# Patient Record
Sex: MALE | Race: BLACK OR AFRICAN AMERICAN | Employment: OTHER | URBAN - METROPOLITAN AREA
[De-identification: names, ages, dates, MRNs, and addresses within clinical notes are randomized per-mention and may not be internally consistent; named-entity substitution may affect disease eponyms.]

---

## 2020-08-15 ENCOUNTER — APPOINTMENT (OUTPATIENT)
Dept: ULTRASOUND IMAGING | Age: 60
DRG: 308 | End: 2020-08-15
Payer: OTHER GOVERNMENT

## 2020-08-15 ENCOUNTER — HOSPITAL ENCOUNTER (INPATIENT)
Age: 60
LOS: 3 days | Discharge: ANOTHER HOSPITAL | DRG: 308 | End: 2020-08-18
Attending: EMERGENCY MEDICINE | Admitting: INTERNAL MEDICINE
Payer: OTHER GOVERNMENT

## 2020-08-15 ENCOUNTER — APPOINTMENT (OUTPATIENT)
Dept: GENERAL RADIOLOGY | Age: 60
DRG: 308 | End: 2020-08-15
Payer: OTHER GOVERNMENT

## 2020-08-15 ENCOUNTER — APPOINTMENT (OUTPATIENT)
Dept: CT IMAGING | Age: 60
DRG: 308 | End: 2020-08-15
Payer: OTHER GOVERNMENT

## 2020-08-15 PROBLEM — I48.92 ATRIAL FLUTTER WITH RAPID VENTRICULAR RESPONSE (HCC): Status: ACTIVE | Noted: 2020-08-15

## 2020-08-15 PROBLEM — I50.9 NEW ONSET OF CONGESTIVE HEART FAILURE (HCC): Status: ACTIVE | Noted: 2020-08-15

## 2020-08-15 PROBLEM — I50.9 ACUTE DECOMPENSATED HEART FAILURE (HCC): Status: ACTIVE | Noted: 2020-08-15

## 2020-08-15 LAB
ALBUMIN SERPL-MCNC: 4.1 G/DL (ref 3.5–5.2)
ALP BLD-CCNC: 51 U/L (ref 40–129)
ALT SERPL-CCNC: 264 U/L (ref 0–40)
ANION GAP SERPL CALCULATED.3IONS-SCNC: 11 MMOL/L (ref 7–16)
APTT: 31.5 SEC (ref 24.5–35.1)
AST SERPL-CCNC: 141 U/L (ref 0–39)
BASOPHILS ABSOLUTE: 0.03 E9/L (ref 0–0.2)
BASOPHILS RELATIVE PERCENT: 0.6 % (ref 0–2)
BILIRUB SERPL-MCNC: 1.4 MG/DL (ref 0–1.2)
BUN BLDV-MCNC: 14 MG/DL (ref 6–20)
CALCIUM SERPL-MCNC: 9.4 MG/DL (ref 8.6–10.2)
CHLORIDE BLD-SCNC: 99 MMOL/L (ref 98–107)
CO2: 24 MMOL/L (ref 22–29)
CREAT SERPL-MCNC: 1.2 MG/DL (ref 0.7–1.2)
EOSINOPHILS ABSOLUTE: 0.06 E9/L (ref 0.05–0.5)
EOSINOPHILS RELATIVE PERCENT: 1.3 % (ref 0–6)
GFR AFRICAN AMERICAN: >60
GFR NON-AFRICAN AMERICAN: >60 ML/MIN/1.73
GLUCOSE BLD-MCNC: 130 MG/DL (ref 74–99)
HCT VFR BLD CALC: 41.9 % (ref 37–54)
HEMOGLOBIN: 13.9 G/DL (ref 12.5–16.5)
IMMATURE GRANULOCYTES #: 0.02 E9/L
IMMATURE GRANULOCYTES %: 0.4 % (ref 0–5)
INR BLD: 1.1
LYMPHOCYTES ABSOLUTE: 1.39 E9/L (ref 1.5–4)
LYMPHOCYTES RELATIVE PERCENT: 30.1 % (ref 20–42)
MCH RBC QN AUTO: 34.8 PG (ref 26–35)
MCHC RBC AUTO-ENTMCNC: 33.2 % (ref 32–34.5)
MCV RBC AUTO: 104.8 FL (ref 80–99.9)
MONOCYTES ABSOLUTE: 0.54 E9/L (ref 0.1–0.95)
MONOCYTES RELATIVE PERCENT: 11.7 % (ref 2–12)
NEUTROPHILS ABSOLUTE: 2.58 E9/L (ref 1.8–7.3)
NEUTROPHILS RELATIVE PERCENT: 55.9 % (ref 43–80)
PDW BLD-RTO: 14 FL (ref 11.5–15)
PLATELET # BLD: 212 E9/L (ref 130–450)
PMV BLD AUTO: 11.3 FL (ref 7–12)
POTASSIUM REFLEX MAGNESIUM: 4.1 MMOL/L (ref 3.5–5)
PRO-BNP: 3313 PG/ML (ref 0–125)
PROTHROMBIN TIME: 12.8 SEC (ref 9.3–12.4)
RBC # BLD: 4 E12/L (ref 3.8–5.8)
SARS-COV-2, NAAT: NOT DETECTED
SODIUM BLD-SCNC: 134 MMOL/L (ref 132–146)
TOTAL PROTEIN: 7 G/DL (ref 6.4–8.3)
TROPONIN: <0.01 NG/ML (ref 0–0.03)
TSH SERPL DL<=0.05 MIU/L-ACNC: 3.3 UIU/ML (ref 0.27–4.2)
WBC # BLD: 4.6 E9/L (ref 4.5–11.5)

## 2020-08-15 PROCEDURE — 93970 EXTREMITY STUDY: CPT

## 2020-08-15 PROCEDURE — 85730 THROMBOPLASTIN TIME PARTIAL: CPT

## 2020-08-15 PROCEDURE — 6360000004 HC RX CONTRAST MEDICATION: Performed by: RADIOLOGY

## 2020-08-15 PROCEDURE — 99285 EMERGENCY DEPT VISIT HI MDM: CPT

## 2020-08-15 PROCEDURE — 96375 TX/PRO/DX INJ NEW DRUG ADDON: CPT

## 2020-08-15 PROCEDURE — 71046 X-RAY EXAM CHEST 2 VIEWS: CPT

## 2020-08-15 PROCEDURE — U0002 COVID-19 LAB TEST NON-CDC: HCPCS

## 2020-08-15 PROCEDURE — 84443 ASSAY THYROID STIM HORMONE: CPT

## 2020-08-15 PROCEDURE — 99284 EMERGENCY DEPT VISIT MOD MDM: CPT

## 2020-08-15 PROCEDURE — 99223 1ST HOSP IP/OBS HIGH 75: CPT | Performed by: INTERNAL MEDICINE

## 2020-08-15 PROCEDURE — 85610 PROTHROMBIN TIME: CPT

## 2020-08-15 PROCEDURE — 6360000002 HC RX W HCPCS: Performed by: PHYSICIAN ASSISTANT

## 2020-08-15 PROCEDURE — 2060000000 HC ICU INTERMEDIATE R&B

## 2020-08-15 PROCEDURE — 2500000003 HC RX 250 WO HCPCS: Performed by: EMERGENCY MEDICINE

## 2020-08-15 PROCEDURE — 80053 COMPREHEN METABOLIC PANEL: CPT

## 2020-08-15 PROCEDURE — 2580000003 HC RX 258: Performed by: EMERGENCY MEDICINE

## 2020-08-15 PROCEDURE — 84484 ASSAY OF TROPONIN QUANT: CPT

## 2020-08-15 PROCEDURE — 85025 COMPLETE CBC W/AUTO DIFF WBC: CPT

## 2020-08-15 PROCEDURE — G0480 DRUG TEST DEF 1-7 CLASSES: HCPCS

## 2020-08-15 PROCEDURE — 71275 CT ANGIOGRAPHY CHEST: CPT

## 2020-08-15 PROCEDURE — 93005 ELECTROCARDIOGRAM TRACING: CPT | Performed by: EMERGENCY MEDICINE

## 2020-08-15 PROCEDURE — 80307 DRUG TEST PRSMV CHEM ANLYZR: CPT

## 2020-08-15 PROCEDURE — 93005 ELECTROCARDIOGRAM TRACING: CPT | Performed by: PHYSICIAN ASSISTANT

## 2020-08-15 PROCEDURE — 96366 THER/PROPH/DIAG IV INF ADDON: CPT

## 2020-08-15 PROCEDURE — 83880 ASSAY OF NATRIURETIC PEPTIDE: CPT

## 2020-08-15 PROCEDURE — 96365 THER/PROPH/DIAG IV INF INIT: CPT

## 2020-08-15 RX ORDER — FUROSEMIDE 10 MG/ML
40 INJECTION INTRAMUSCULAR; INTRAVENOUS ONCE
Status: COMPLETED | OUTPATIENT
Start: 2020-08-15 | End: 2020-08-15

## 2020-08-15 RX ADMIN — FUROSEMIDE 40 MG: 10 INJECTION, SOLUTION INTRAMUSCULAR; INTRAVENOUS at 20:46

## 2020-08-15 RX ADMIN — DILTIAZEM HYDROCHLORIDE 25 MG: 5 INJECTION INTRAVENOUS at 21:11

## 2020-08-15 RX ADMIN — DEXTROSE MONOHYDRATE 5 MG/HR: 50 INJECTION, SOLUTION INTRAVENOUS at 22:29

## 2020-08-15 RX ADMIN — DILTIAZEM HYDROCHLORIDE 25 MG: 5 INJECTION INTRAVENOUS at 20:46

## 2020-08-15 RX ADMIN — IOPAMIDOL 85 ML: 755 INJECTION, SOLUTION INTRAVENOUS at 21:57

## 2020-08-15 NOTE — ED PROVIDER NOTES
ATTENDING PROVIDER ATTESTATION:     Ashley Levin presented to the emergency department for evaluation of Leg Swelling (bilateral, sob on exertion, x2 weeks) and Shortness of Breath    I have reviewed and discussed the case, including pertinent history (medical, surgical, family and social) and exam findings with the Midlevel and the Nurse assigned to Ashley Levin. I have personally performed and/or participated in the history, exam, medical decision making, and procedures and agree with all pertinent clinical information. HPI:  Patient is a 70-year-old male presenting with shortness of breath which is worse when laying flat and on exertion for 2 weeks. Shortness of breath is better with rest.  He also reports bilateral lower extremity. No chest pain, fevers, cough, abdominal pain, nausea, or vomiting. No personal cardiac history. He is a daily drinker. ROS:  Otherwise negative unless stated in HPI    PHYSICAL EXAM:  Vitals Reviewed  Constitutional/General: Alert and oriented x3, well appearing, non toxic in NAD  Head: Normocephalic and atraumatic  Eyes: PERRL, EOMI  Mouth: Oropharynx clear, handling secretions, no trismus  Neck: Supple, full ROM,   Pulmonary: Lungs crackles at lung bases, no respiratory distress  Cardiovascular:  Tachycardic. Irregular rhythm, no murmurs, gallops, or rubs. 2+ distal pulses  Abdomen: Soft, non tender, non distended,   Extremities: Moves all extremities x 4. Warm and well perfused. Bilateral pitting edema to LE, soft and easily compressible compartments  Skin: warm and dry without rash  Neurologic: GCS 15,  Psych: Normal Affect    MDM:   Patient is a 70-year-old male presenting with shortness of breath, orthopnea, and lower extremity edema. Patient was tachycardic on arrival with initial heart rates in the 140s. His blood pressures were stable, and he was afebrile. He was placed on the cardiac monitor. I reviewed and interpreted findings.   Patient in new onset a flutter with rapid rate. He also had nonspecific T wave changes on his EKG. CTA chest, bilateral lower extremity Dopplers, and labs were obtained. I reviewed and interpreted findings. Patient has no evidence of PE. He has mild pulmonary edema but good oxygen saturations. He was given Lasix. No DVTs on bilateral lower extremity Dopplers. He was given a bolus of Cardizem, and he was started on a Cardizem drip with good improvement of heart rates. Patient in no acute distress on reevaluation. Discussed findings and plan for admission. Plan to admit for further treatment of new onset a flutter with rapid rate. Patient was originally seen by the midlevel provider, and I assumed care of the patient. Kassi Henderson MD, am the primary provider of this chart. Critical Care:  Please note that the withdrawal or failure to initiate urgent interventions for this patient would likely result in a life threatening deterioration or permanent disability. Accordingly this patient received 30 minutes of critical care time, excluding separately billable procedures. Clinical Impression:  1. Atrial flutter with rapid ventricular response ([address]    2. Acute congestive heart failure, unspecified heart failure type ([address]      Dispo: Admit to telemetry  Condition: Stable        I have reviewed my findings and recommendations with Mason Patrick and members of family present at the time of disposition. My findings/plan: The primary encounter diagnosis was Atrial flutter with rapid ventricular response ([address] A diagnosis of Acute congestive heart failure, unspecified heart failure type [facility]) was also pertinent to this visit. New Prescriptions    No medications on file     Odalis Carcamo MD      ED Attending  CC:  No     Department of Emergency Medicine   ED  Provider Note  Admit Date/RoomTime: 8/15/2020  7:04 PM  ED Room: 04/04  Chief Complaint      Leg Swelling (bilateral, sob on exertion, x2 weeks) and Shortness of Breath    History of Present Illness   Source of history provided by:  patient. History/Exam Limitations: none. Raymundo Vyas is a 64 y.o. old male who has a past medical history of:   Past Medical History:   Diagnosis Date    Arthritis     Hypertension     Preoperative clearance 08/18/2016    medical clearance on paper chart from Dr. Dwight Clayton. Andrea Roberth Seasonal allergies     presents to the emergency department with a complaint of shortness of breath and leg swelling. Patient states for about 2 weeks now he has been short of breath. Progressively getting worse. Now to the point even small activity at home makes him short of breath. Unable to lay flat at night. Does also admit to chest pressure. Patient also complains of leg swelling. That started about 1 week ago. States he has never had swelling in his legs or feet before in the past.  Patient denies fevers and chills. Denies sore throat. Denies nausea, vomiting, diarrhea. States the swelling does not go up to his testicles. He denies any cardiac issues. Patient denies any lung diseases. Patient denies any kidney issues or failure. States he only has a history of hypertension. Ran out of his amlodipine 1 week ago. Patient complains of chronic right hip pain. States he was was to have a right hip replacement, but everything was canceled because of the coronavirus. Does admit that he takes Motrin 800s 3-4 times a day. States he has done this off and on now for the past few months. N/A  ROS   Pertinent positives and negatives are stated within HPI, all other systems reviewed and are negative. Past Surgical History:   Procedure Laterality Date    BACK SURGERY      DILATATION, ESOPHAGUS      HERNIA REPAIR      HIP SURGERY  09/13/2016    left total; hip arthroplasty   Social History:  reports that he has been smoking cigarettes. He has a 3.75 pack-year smoking history.  He uses smokeless tobacco. He reports current alcohol use of about 3.0 standard drinks of alcohol per week. He reports that he does not use drugs. Family History: family history is not on file. Allergies: Ace inhibitors and Lisinopril    Physical Exam           ED Triage Vitals   BP Temp Temp src Pulse Resp SpO2 Height Weight   08/15/20 1924 08/15/20 1854 -- 08/15/20 1854 08/15/20 1854 08/15/20 1854 -- --   (!) 159/106 97.1 °F (36.2 °C)  80 16 98 %        Oxygen Saturation Interpretation: Normal.  General:  NAD. Alert and Oriented. Well-appearing. Skin:  Warm, dry. No rashes. Head:  Normocephalic. Atraumatic. Eyes:  EOMI. Conjunctiva normal.  ENT:  Oral mucosa moist.  Airway patent. Neck:  Supple. Normal ROM. Positive JVD. Respiratory: Tachypnea. No labored breathing. Lungs diminished with bilateral rales at the bases. Cardiovascular: Tachycardia. Bilateral lower extremity pitting edema up to the level of distal thighs bilateral.  Extremities warm and good color. Chest: Nontender to palpation. Abdomen:  Soft, nondistended. Normal bowel sounds. Nontender to palpation all 4 quadrants. Negative rebound, negative guarding. Rectal:  Gu: Bladder nontender and non distended. No CVA tenderness. Pelvic:  Extremities:  Normal ROM. Nontender to palpation. Atraumatic. Back:  Normal ROM. Nontender to palpation. Neuro:  Alert and Oriented to person, place, time and situation. Normal LOC. Moves all extremities. Speech fluent. Psych:  Calm and Cooperative. Normal thought process. Normal judgement.         Lab / Imaging Results   (All laboratory and radiology results have been personally reviewed by myself)  Labs:  Results for orders placed or performed during the hospital encounter of 08/15/20   CBC Auto Differential   Result Value Ref Range    WBC 4.6 4.5 - 11.5 E9/L    RBC 4.00 3.80 - 5.80 E12/L    Hemoglobin 13.9 12.5 - 16.5 g/dL    Hematocrit 41.9 37.0 - 54.0 %    .8 (H) 80.0 - 99.9 fL    MCH 34.8 26.0 - 35.0 pg    MCHC 33.2 32.0 - 34.5 %    RDW 14.0 11.5 - 15.0 fL    Platelets 527 376 - 439 E9/L    MPV 11.3 7.0 - 12.0 fL    Neutrophils % 55.9 43.0 - 80.0 %    Immature Granulocytes % 0.4 0.0 - 5.0 %    Lymphocytes % 30.1 20.0 - 42.0 %    Monocytes % 11.7 2.0 - 12.0 %    Eosinophils % 1.3 0.0 - 6.0 %    Basophils % 0.6 0.0 - 2.0 %    Neutrophils Absolute 2.58 1.80 - 7.30 E9/L    Immature Granulocytes # 0.02 E9/L    Lymphocytes Absolute 1.39 (L) 1.50 - 4.00 E9/L    Monocytes Absolute 0.54 0.10 - 0.95 E9/L    Eosinophils Absolute 0.06 0.05 - 0.50 E9/L    Basophils Absolute 0.03 0.00 - 0.20 E9/L   Comprehensive Metabolic Panel w/ Reflex to MG   Result Value Ref Range    Sodium 134 132 - 146 mmol/L    Potassium reflex Magnesium 4.1 3.5 - 5.0 mmol/L    Chloride 99 98 - 107 mmol/L    CO2 24 22 - 29 mmol/L    Anion Gap 11 7 - 16 mmol/L    Glucose 130 (H) 74 - 99 mg/dL    BUN 14 6 - 20 mg/dL    CREATININE 1.2 0.7 - 1.2 mg/dL    GFR Non-African American >60 >=60 mL/min/1.73    GFR African American >60     Calcium 9.4 8.6 - 10.2 mg/dL    Total Protein 7.0 6.4 - 8.3 g/dL    Alb 4.1 3.5 - 5.2 g/dL    Total Bilirubin 1.4 (H) 0.0 - 1.2 mg/dL    Alkaline Phosphatase 51 40 - 129 U/L     (H) 0 - 40 U/L     (H) 0 - 39 U/L   Troponin   Result Value Ref Range    Troponin <0.01 0.00 - 0.03 ng/mL   Brain Natriuretic Peptide   Result Value Ref Range    Pro-BNP 3,313 (H) 0 - 125 pg/mL   Protime-INR   Result Value Ref Range    Protime 12.8 (H) 9.3 - 12.4 sec    INR 1.1    APTT   Result Value Ref Range    aPTT 31.5 24.5 - 35.1 sec   COVID-19   Result Value Ref Range    SARS-CoV-2, NAAT Not Detected Not Detected   TSH WITHOUT REFLEX   Result Value Ref Range    TSH 3.300 0.270 - 4.200 uIU/mL   Serum Drug Screen   Result Value Ref Range    Ethanol Lvl <10 mg/dL    Acetaminophen Level <5.0 (L) 10.0 - 99.9 mcg/mL    Salicylate, Serum <1.5 0.0 - 30.0 mg/dL     Imaging: All Radiology results interpreted by Radiologist unless otherwise noted.   CTA PULMONARY W CONTRAST   Final Result      1. No pulmonary embolism. 2. Cardiomegaly with mild interstitial edema in the lungs indicating   CHF exacerbation. 3. Hepatic steatosis. US DUP LOWER EXTREMITIES BILATERAL VENOUS   Final Result   No evidence to suggest deep venous thrombosis of the bilateral lower   extremities. XR CHEST (2 VW)   Final Result   Mild pulmonary congestion without evidence of significant pulmonary   edema or pleural effusion. EKG #1:  Interpreted by emergency department physician unless otherwise noted. Done 1932. Tachycardia with a heart rate of 138. Questionable flutter waves noted at 2-1. Diffuse ST depression. QTc 424. ED Course / Medical Decision Making     Medications   diltiazem (CARDIZEM) 25 mg in dextrose 5% 30 mL IVPB (ER Load) (0 mg Intravenous Stopped 8/15/20 2218)   dilTIAZem 100 mg in dextrose 5 % 100 mL infusion (ADD-Argillite) (10 mg/hr Intravenous Rate/Dose Change 8/15/20 2258)   furosemide (LASIX) injection 40 mg (40 mg Intravenous Given 8/15/20 2046)   iopamidol (ISOVUE-370) 76 % injection 85 mL (85 mLs Intravenous Given 8/15/20 2157)       Re-Evaluations:  8/15/20      Time:     Patients symptoms are improving. Consultations:             None    Procedures:   none    MDM:  Case discussed with MD/DO and/or ABI. Presenting complaint and pertinent exam findings were relayed. Current work-up and results and pending tests discussed. All questions answered. Care transitioned to Dr Jamison Pepe. Counseling:   I have spoken with the patient and discussed todays results, in addition to providing specific details for the plan of care and counseling regarding the diagnosis and prognosis and are agreeable with the plan. Assessment      1. Atrial flutter with rapid ventricular response ([address]    2.  Acute congestive heart failure, unspecified heart failure type [facility])      This patient's ED course included: a personal history and

## 2020-08-16 LAB
ACETAMINOPHEN LEVEL: <5 MCG/ML (ref 10–30)
ALBUMIN SERPL-MCNC: 3.8 G/DL (ref 3.5–5.2)
ALP BLD-CCNC: 48 U/L (ref 40–129)
ALT SERPL-CCNC: 232 U/L (ref 0–40)
AMPHETAMINE SCREEN, URINE: NOT DETECTED
ANION GAP SERPL CALCULATED.3IONS-SCNC: 13 MMOL/L (ref 7–16)
AST SERPL-CCNC: 116 U/L (ref 0–39)
BARBITURATE SCREEN URINE: NOT DETECTED
BASOPHILS ABSOLUTE: 0.04 E9/L (ref 0–0.2)
BASOPHILS RELATIVE PERCENT: 0.9 % (ref 0–2)
BENZODIAZEPINE SCREEN, URINE: NOT DETECTED
BILIRUB SERPL-MCNC: 1.1 MG/DL (ref 0–1.2)
BILIRUBIN DIRECT: 0.4 MG/DL (ref 0–0.3)
BILIRUBIN, INDIRECT: 0.7 MG/DL (ref 0–1)
BUN BLDV-MCNC: 15 MG/DL (ref 6–20)
CALCIUM SERPL-MCNC: 9.2 MG/DL (ref 8.6–10.2)
CANNABINOID SCREEN URINE: NOT DETECTED
CHLORIDE BLD-SCNC: 100 MMOL/L (ref 98–107)
CHOLESTEROL, FASTING: 155 MG/DL (ref 0–199)
CO2: 21 MMOL/L (ref 22–29)
COCAINE METABOLITE SCREEN URINE: NOT DETECTED
CREAT SERPL-MCNC: 1.2 MG/DL (ref 0.7–1.2)
EKG ATRIAL RATE: 276 BPM
EKG ATRIAL RATE: 277 BPM
EKG P AXIS: -90 DEGREES
EKG Q-T INTERVAL: 280 MS
EKG Q-T INTERVAL: 322 MS
EKG QRS DURATION: 76 MS
EKG QRS DURATION: 80 MS
EKG QTC CALCULATION (BAZETT): 424 MS
EKG QTC CALCULATION (BAZETT): 458 MS
EKG R AXIS: 24 DEGREES
EKG R AXIS: 69 DEGREES
EKG T AXIS: -111 DEGREES
EKG T AXIS: -97 DEGREES
EKG VENTRICULAR RATE: 122 BPM
EKG VENTRICULAR RATE: 138 BPM
EOSINOPHILS ABSOLUTE: 0.09 E9/L (ref 0.05–0.5)
EOSINOPHILS RELATIVE PERCENT: 2 % (ref 0–6)
ETHANOL: <10 MG/DL (ref 0–0.08)
FENTANYL SCREEN, URINE: NOT DETECTED
GFR AFRICAN AMERICAN: >60
GFR NON-AFRICAN AMERICAN: >60 ML/MIN/1.73
GLUCOSE BLD-MCNC: 105 MG/DL (ref 74–99)
HBA1C MFR BLD: 4.6 % (ref 4–5.6)
HCT VFR BLD CALC: 39.9 % (ref 37–54)
HDLC SERPL-MCNC: 57 MG/DL
HEMOGLOBIN: 13.5 G/DL (ref 12.5–16.5)
IMMATURE GRANULOCYTES #: 0.01 E9/L
IMMATURE GRANULOCYTES %: 0.2 % (ref 0–5)
LDL CHOLESTEROL CALCULATED: 83 MG/DL (ref 0–99)
LV EF: 23 %
LVEF MODALITY: NORMAL
LYMPHOCYTES ABSOLUTE: 1.52 E9/L (ref 1.5–4)
LYMPHOCYTES RELATIVE PERCENT: 33.9 % (ref 20–42)
Lab: NORMAL
MAGNESIUM: 1.8 MG/DL (ref 1.6–2.6)
MCH RBC QN AUTO: 34.8 PG (ref 26–35)
MCHC RBC AUTO-ENTMCNC: 33.8 % (ref 32–34.5)
MCV RBC AUTO: 102.8 FL (ref 80–99.9)
METHADONE SCREEN, URINE: NOT DETECTED
MONOCYTES ABSOLUTE: 0.58 E9/L (ref 0.1–0.95)
MONOCYTES RELATIVE PERCENT: 12.9 % (ref 2–12)
NEUTROPHILS ABSOLUTE: 2.24 E9/L (ref 1.8–7.3)
NEUTROPHILS RELATIVE PERCENT: 50.1 % (ref 43–80)
OPIATE SCREEN URINE: NOT DETECTED
OXYCODONE URINE: NOT DETECTED
PDW BLD-RTO: 13.9 FL (ref 11.5–15)
PHENCYCLIDINE SCREEN URINE: NOT DETECTED
PLATELET # BLD: 201 E9/L (ref 130–450)
PMV BLD AUTO: 11.2 FL (ref 7–12)
POTASSIUM REFLEX MAGNESIUM: 3.7 MMOL/L (ref 3.5–5)
RBC # BLD: 3.88 E12/L (ref 3.8–5.8)
SALICYLATE, SERUM: <0.3 MG/DL (ref 0–30)
SODIUM BLD-SCNC: 134 MMOL/L (ref 132–146)
TOTAL PROTEIN: 6.4 G/DL (ref 6.4–8.3)
TRICYCLIC ANTIDEPRESSANTS SCREEN SERUM: NEGATIVE NG/ML
TRIGLYCERIDE, FASTING: 74 MG/DL (ref 0–149)
TROPONIN: 0.01 NG/ML (ref 0–0.03)
VLDLC SERPL CALC-MCNC: 15 MG/DL
WBC # BLD: 4.5 E9/L (ref 4.5–11.5)

## 2020-08-16 PROCEDURE — 93010 ELECTROCARDIOGRAM REPORT: CPT | Performed by: INTERNAL MEDICINE

## 2020-08-16 PROCEDURE — 83735 ASSAY OF MAGNESIUM: CPT

## 2020-08-16 PROCEDURE — 80307 DRUG TEST PRSMV CHEM ANLYZR: CPT

## 2020-08-16 PROCEDURE — 80048 BASIC METABOLIC PNL TOTAL CA: CPT

## 2020-08-16 PROCEDURE — 84484 ASSAY OF TROPONIN QUANT: CPT

## 2020-08-16 PROCEDURE — 6360000002 HC RX W HCPCS: Performed by: INTERNAL MEDICINE

## 2020-08-16 PROCEDURE — 36415 COLL VENOUS BLD VENIPUNCTURE: CPT

## 2020-08-16 PROCEDURE — APPSS60 APP SPLIT SHARED TIME 46-60 MINUTES: Performed by: NURSE PRACTITIONER

## 2020-08-16 PROCEDURE — 6370000000 HC RX 637 (ALT 250 FOR IP): Performed by: INTERNAL MEDICINE

## 2020-08-16 PROCEDURE — 99233 SBSQ HOSP IP/OBS HIGH 50: CPT | Performed by: INTERNAL MEDICINE

## 2020-08-16 PROCEDURE — 6360000002 HC RX W HCPCS: Performed by: NURSE PRACTITIONER

## 2020-08-16 PROCEDURE — 93306 TTE W/DOPPLER COMPLETE: CPT

## 2020-08-16 PROCEDURE — 2060000000 HC ICU INTERMEDIATE R&B

## 2020-08-16 PROCEDURE — 2500000003 HC RX 250 WO HCPCS: Performed by: EMERGENCY MEDICINE

## 2020-08-16 PROCEDURE — 80061 LIPID PANEL: CPT

## 2020-08-16 PROCEDURE — 99254 IP/OBS CNSLTJ NEW/EST MOD 60: CPT | Performed by: INTERNAL MEDICINE

## 2020-08-16 PROCEDURE — 83036 HEMOGLOBIN GLYCOSYLATED A1C: CPT

## 2020-08-16 PROCEDURE — 2580000003 HC RX 258: Performed by: INTERNAL MEDICINE

## 2020-08-16 PROCEDURE — 6370000000 HC RX 637 (ALT 250 FOR IP): Performed by: NURSE PRACTITIONER

## 2020-08-16 PROCEDURE — 85025 COMPLETE CBC W/AUTO DIFF WBC: CPT

## 2020-08-16 PROCEDURE — 80076 HEPATIC FUNCTION PANEL: CPT

## 2020-08-16 RX ORDER — ONDANSETRON 2 MG/ML
4 INJECTION INTRAMUSCULAR; INTRAVENOUS EVERY 6 HOURS PRN
Status: DISCONTINUED | OUTPATIENT
Start: 2020-08-16 | End: 2020-08-18 | Stop reason: HOSPADM

## 2020-08-16 RX ORDER — SPIRONOLACTONE 25 MG/1
25 TABLET ORAL DAILY
Status: DISCONTINUED | OUTPATIENT
Start: 2020-08-16 | End: 2020-08-16

## 2020-08-16 RX ORDER — POTASSIUM CHLORIDE 20 MEQ/1
40 TABLET, EXTENDED RELEASE ORAL PRN
Status: DISCONTINUED | OUTPATIENT
Start: 2020-08-16 | End: 2020-08-18 | Stop reason: HOSPADM

## 2020-08-16 RX ORDER — AMLODIPINE BESYLATE 2.5 MG/1
5 TABLET ORAL DAILY
Status: ON HOLD | COMMUNITY
End: 2020-08-20 | Stop reason: HOSPADM

## 2020-08-16 RX ORDER — POTASSIUM CHLORIDE 7.45 MG/ML
10 INJECTION INTRAVENOUS PRN
Status: DISCONTINUED | OUTPATIENT
Start: 2020-08-16 | End: 2020-08-18 | Stop reason: HOSPADM

## 2020-08-16 RX ORDER — ACETAMINOPHEN 650 MG/1
650 SUPPOSITORY RECTAL EVERY 6 HOURS PRN
Status: DISCONTINUED | OUTPATIENT
Start: 2020-08-16 | End: 2020-08-18 | Stop reason: HOSPADM

## 2020-08-16 RX ORDER — PROMETHAZINE HYDROCHLORIDE 25 MG/1
12.5 TABLET ORAL EVERY 6 HOURS PRN
Status: DISCONTINUED | OUTPATIENT
Start: 2020-08-16 | End: 2020-08-18 | Stop reason: HOSPADM

## 2020-08-16 RX ORDER — FUROSEMIDE 10 MG/ML
40 INJECTION INTRAMUSCULAR; INTRAVENOUS DAILY
Status: DISCONTINUED | OUTPATIENT
Start: 2020-08-16 | End: 2020-08-16

## 2020-08-16 RX ORDER — POTASSIUM CHLORIDE 20 MEQ/1
20 TABLET, EXTENDED RELEASE ORAL ONCE
Status: COMPLETED | OUTPATIENT
Start: 2020-08-16 | End: 2020-08-16

## 2020-08-16 RX ORDER — MAGNESIUM SULFATE IN WATER 40 MG/ML
2 INJECTION, SOLUTION INTRAVENOUS ONCE
Status: COMPLETED | OUTPATIENT
Start: 2020-08-16 | End: 2020-08-16

## 2020-08-16 RX ORDER — ACETAMINOPHEN 325 MG/1
650 TABLET ORAL EVERY 6 HOURS PRN
Status: DISCONTINUED | OUTPATIENT
Start: 2020-08-16 | End: 2020-08-18 | Stop reason: HOSPADM

## 2020-08-16 RX ORDER — FUROSEMIDE 10 MG/ML
40 INJECTION INTRAMUSCULAR; INTRAVENOUS 2 TIMES DAILY
Status: DISCONTINUED | OUTPATIENT
Start: 2020-08-16 | End: 2020-08-17

## 2020-08-16 RX ORDER — MAGNESIUM SULFATE 1 G/100ML
1 INJECTION INTRAVENOUS PRN
Status: DISCONTINUED | OUTPATIENT
Start: 2020-08-16 | End: 2020-08-18 | Stop reason: HOSPADM

## 2020-08-16 RX ORDER — METOPROLOL SUCCINATE 25 MG/1
25 TABLET, EXTENDED RELEASE ORAL DAILY
Status: DISCONTINUED | OUTPATIENT
Start: 2020-08-16 | End: 2020-08-16

## 2020-08-16 RX ORDER — SODIUM CHLORIDE 0.9 % (FLUSH) 0.9 %
10 SYRINGE (ML) INJECTION PRN
Status: DISCONTINUED | OUTPATIENT
Start: 2020-08-16 | End: 2020-08-18 | Stop reason: HOSPADM

## 2020-08-16 RX ORDER — SODIUM CHLORIDE 0.9 % (FLUSH) 0.9 %
10 SYRINGE (ML) INJECTION EVERY 12 HOURS SCHEDULED
Status: DISCONTINUED | OUTPATIENT
Start: 2020-08-16 | End: 2020-08-18 | Stop reason: HOSPADM

## 2020-08-16 RX ORDER — METOPROLOL SUCCINATE 50 MG/1
50 TABLET, EXTENDED RELEASE ORAL 2 TIMES DAILY
Status: DISCONTINUED | OUTPATIENT
Start: 2020-08-16 | End: 2020-08-18 | Stop reason: HOSPADM

## 2020-08-16 RX ORDER — METOPROLOL SUCCINATE 25 MG/1
25 TABLET, EXTENDED RELEASE ORAL ONCE
Status: COMPLETED | OUTPATIENT
Start: 2020-08-16 | End: 2020-08-16

## 2020-08-16 RX ORDER — SPIRONOLACTONE 25 MG/1
12.5 TABLET ORAL DAILY
Status: DISCONTINUED | OUTPATIENT
Start: 2020-08-17 | End: 2020-08-16

## 2020-08-16 RX ADMIN — ENOXAPARIN SODIUM 80 MG: 80 INJECTION SUBCUTANEOUS at 08:53

## 2020-08-16 RX ADMIN — METOPROLOL SUCCINATE 50 MG: 50 TABLET, EXTENDED RELEASE ORAL at 20:15

## 2020-08-16 RX ADMIN — METOPROLOL SUCCINATE 25 MG: 25 TABLET, EXTENDED RELEASE ORAL at 10:55

## 2020-08-16 RX ADMIN — METOPROLOL SUCCINATE 25 MG: 25 TABLET, EXTENDED RELEASE ORAL at 00:50

## 2020-08-16 RX ADMIN — SODIUM CHLORIDE, PRESERVATIVE FREE 10 ML: 5 INJECTION INTRAVENOUS at 08:53

## 2020-08-16 RX ADMIN — FUROSEMIDE 40 MG: 10 INJECTION, SOLUTION INTRAMUSCULAR; INTRAVENOUS at 17:47

## 2020-08-16 RX ADMIN — POTASSIUM CHLORIDE 20 MEQ: 20 TABLET, EXTENDED RELEASE ORAL at 09:43

## 2020-08-16 RX ADMIN — FUROSEMIDE 40 MG: 10 INJECTION, SOLUTION INTRAMUSCULAR; INTRAVENOUS at 08:52

## 2020-08-16 RX ADMIN — MAGNESIUM SULFATE HEPTAHYDRATE 2 G: 40 INJECTION, SOLUTION INTRAVENOUS at 10:25

## 2020-08-16 RX ADMIN — ENOXAPARIN SODIUM 80 MG: 80 INJECTION SUBCUTANEOUS at 00:50

## 2020-08-16 RX ADMIN — ENOXAPARIN SODIUM 80 MG: 80 INJECTION SUBCUTANEOUS at 20:15

## 2020-08-16 RX ADMIN — SODIUM CHLORIDE, PRESERVATIVE FREE 10 ML: 5 INJECTION INTRAVENOUS at 20:16

## 2020-08-16 RX ADMIN — DEXTROSE MONOHYDRATE 10 MG/HR: 50 INJECTION, SOLUTION INTRAVENOUS at 06:24

## 2020-08-16 RX ADMIN — SPIRONOLACTONE 25 MG: 25 TABLET ORAL at 08:52

## 2020-08-16 ASSESSMENT — PAIN SCALES - GENERAL
PAINLEVEL_OUTOF10: 0

## 2020-08-16 NOTE — CONSULTS
I independently interviewed and examined the patient. I have reviewed the above documentation completed by the ABI. Please see my additional contributions to the HPI, physical exam, and assessment / medical decision making. Reason for consult: Atrial fibrillation with RVR and CHF    He was not previously on to [facility]) cardiology. Mr. Cathy Myers is a 56-year-old -American gentleman with history of hypertension, tobacco use quit recently about 2 weeks ago mild obesity, osteoarthritis of the hips and retired from [employer], gained about 30 pounds after he stopped walking and denies any prior history of any coronary artery disease CVA, CHF or diabetes. He had a history of angioedema with ACE inhibitors. He presented to the hospital for the progressively increasing dyspnea for the past 3 weeks. His symptoms started about a month back mainly with exertion noticed dyspnea on going up and down the steps. Now for the past 3 weeks he noticed worsening of dyspnea orthopnea, paroxysmal nocturnal dyspnea. Now, he noticed leg edema for the past few days. He denies any chest pain, palpitations, dizziness syncope or presyncope. He was also not following low-salt diet. He came to the emergency room for further evaluation and was noted to have atrial fibrillation with rapid ventricular response he had a CTA chest which was negative for PE and the troponins were negative. Patient was initiated on Cardizem for  for rate control. His heart rate seems reasonably controlled now on 10 mg/hour of Cardizem infusion. He was also started on metoprolol 25 mg twice daily.   He is feeling much better after coming to the hospital.      Review of Systems:  Cardiac: As per HPI  General: No fever, chills  Pulmonary: As per HPI  GI: No nausea, vomiting  Musculoskeletal: HERRMANN x 4, no focal motor deficits  Skin: Intact, no rashes  Neuro/Psych: No headache or seizures    Physical Exam:  BP (!) 100/58   Pulse 88   Temp 98 °F with you for any further recommendations. Caleb Alonso MD, Sergey Sherwood.   [facility]) Cardiology

## 2020-08-16 NOTE — PLAN OF CARE
Problem: Cardiac:  Goal: Ability to maintain an adequate cardiac output will improve  Description: Ability to maintain an adequate cardiac output will improve  Outcome: Met This Shift  Goal: Hemodynamic stability will improve  Description: Hemodynamic stability will improve  Outcome: Met This Shift     Problem: Fluid Volume:  Goal: Ability to achieve and maintain adequate urine output will improve  Description: Ability to achieve and maintain adequate urine output will improve  Outcome: Met This Shift

## 2020-08-16 NOTE — PROGRESS NOTES
[facility] Hospitalist   Progress Note    Admitting Date and Time: 8/15/2020  7:04 PM  Admit Dx: Atrial flutter with rapid ventricular response ([address] [I48.92]  Atrial flutter with rapid ventricular response ([address] [I48.92]    Subjective:    Patient was admitted with Atrial flutter with rapid ventricular response ([address] [I48.92]  Atrial flutter with rapid ventricular response ([address] [I48.92]. Patient denies fever, chills, cp, sob, n/v.     sodium chloride flush  10 mL Intravenous 2 times per day    enoxaparin  80 mg Subcutaneous BID    furosemide  40 mg Intravenous BID    metoprolol succinate  50 mg Oral BID     sodium chloride flush, 10 mL, PRN  potassium chloride, 40 mEq, PRN    Or  potassium alternative oral replacement, 40 mEq, PRN    Or  potassium chloride, 10 mEq, PRN  magnesium sulfate, 1 g, PRN  acetaminophen, 650 mg, Q6H PRN    Or  acetaminophen, 650 mg, Q6H PRN  magnesium hydroxide, 30 mL, Daily PRN  promethazine, 12.5 mg, Q6H PRN    Or  ondansetron, 4 mg, Q6H PRN         Objective:        PHYSICAL EXAM:    Vitals:  /80   Pulse 60   Temp 97.5 °F (36.4 °C) (Oral)   Resp 16   Ht 6' (1.829 m)   Wt 238 lb 9.6 oz (108.2 kg)   SpO2 95%   BMI 32.36 kg/m²     General:  Appears comfortable. Answers questions appropriately and cooperative with exam  HEENT:  Mucous membranes moist. No erythema, rhinorrhea, or post-nasal drip noted. Neck:  No carotid bruits. Heart:  Rhythm irregular at rate of 64  Lungs:  CTA. No wheeze, rales, or rhonchi  Abdomen:  Positive bowel sounds positive. Soft. Non-tender. No guarding, rebound or rigidity. Breast/Rectal/Genitourinary: not pertinent. Extremities:  positive for 1+ b/l lower extremity edema  Skin:  Warm and dry  Vascular: 2/4 Dorsalis Pedis pulses bilaterally. Neuro:  Cranial nerves 2-12 grossly intact, no focal weakness or change in sensation noted. Extraocular muscles intact. Pupils equal, round, reactive to light. Recent Labs     08/15/20  1948 08/16/20  0328    134   K 4.1 3.7   CL 99 100   CO2 24 21*   BUN 14 15   CREATININE 1.2 1.2   GLUCOSE 130* 105*   CALCIUM 9.4 9.2       Recent Labs     08/15/20  1948 08/16/20  0328   WBC 4.6 4.5   RBC 4.00 3.88   HGB 13.9 13.5   HCT 41.9 39.9   .8* 102.8*   MCH 34.8 34.8   MCHC 33.2 33.8   RDW 14.0 13.9    201   MPV 11.3 11.2       CBC with Differential:    Lab Results   Component Value Date    WBC 4.5 08/16/2020    RBC 3.88 08/16/2020    HGB 13.5 08/16/2020    HCT 39.9 08/16/2020     08/16/2020    .8 08/16/2020    MCH 34.8 08/16/2020    MCHC 33.8 08/16/2020    RDW 13.9 08/16/2020    LYMPHOPCT 33.9 08/16/2020    MONOPCT 12.9 08/16/2020    BASOPCT 0.9 08/16/2020    MONOSABS 0.58 08/16/2020    LYMPHSABS 1.52 08/16/2020    EOSABS 0.09 08/16/2020    BASOSABS 0.04 08/16/2020     BMP:    Lab Results   Component Value Date     08/16/2020    K 3.7 08/16/2020     08/16/2020    CO2 21 08/16/2020    BUN 15 08/16/2020    LABALBU 3.8 08/16/2020    CREATININE 1.2 08/16/2020    CALCIUM 9.2 08/16/2020    GFRAA >60 08/16/2020    LABGLOM >60 08/16/2020    GLUCOSE 105 08/16/2020     Hepatic Function Panel:    Lab Results   Component Value Date    ALKPHOS 48 08/16/2020     08/16/2020     08/16/2020    PROT 6.4 08/16/2020    BILITOT 1.1 08/16/2020    BILIDIR 0.4 08/16/2020    IBILI 0.7 08/16/2020    LABALBU 3.8 08/16/2020     Magnesium:    Lab Results   Component Value Date    MG 1.8 08/16/2020     Troponin:    Lab Results   Component Value Date    TROPONINI 0.01 08/16/2020        Radiology:   CTA PULMONARY W CONTRAST   Final Result      1. No pulmonary embolism. 2. Cardiomegaly with mild interstitial edema in the lungs indicating   CHF exacerbation. 3. Hepatic steatosis. US DUP LOWER EXTREMITIES BILATERAL VENOUS   Final Result   No evidence to suggest deep venous thrombosis of the bilateral lower   extremities. XR CHEST (2 VW)   Final Result   Mild pulmonary congestion without evidence of significant pulmonary   edema or pleural effusion. Assessment:    Principal Problem:    New onset atrial flutter (HCC)  Active Problems:    Essential hypertension    Acute decompensated heart failure ([address]    New onset of congestive heart failure (HCC)  Resolved Problems:    * No resolved hospital problems. *      Plan:  1. Atrial fib/flutter continue anticoagulation. Transition pt off cardizem gtt to po meds. Check echo. Cardiology following. Pt on diurertic  2. htn monitor bp and adjust meds  3. H/o angioedema due to aceI monitor  4. Acidosis monitor  5. Hyperglycemia likely due to stress monitor  6. Elevated lfts possibly due to liver congestion monitor.      Chart reviewed and updated by nursing    Time spent is 35 min    Electronically signed by Chano Marx DO on 8/16/2020 at 6:45 PM

## 2020-08-16 NOTE — CONSULTS
Signed              I independently interviewed and examined the patient. I have reviewed the above documentation completed by the ABI. Please see my additional contributions to the HPI, physical exam, and assessment / medical decision making.     Reason for consult: Atrial fibrillation with RVR and CHF     He was not previously on to [facility] ([facility]) cardiology.     Mr. Yoly Sweeney is a 61-year-old -American gentleman with history of hypertension, tobacco use quit recently about 2 weeks ago mild obesity, osteoarthritis of the hips and retired from [employer], gained about 30 pounds after he stopped walking and denies any prior history of any coronary artery disease CVA, CHF or diabetes. He had a history of angioedema with ACE inhibitors. He presented to the hospital for the progressively increasing dyspnea for the past 3 weeks. His symptoms started about a month back mainly with exertion noticed dyspnea on going up and down the steps. Now for the past 3 weeks he noticed worsening of dyspnea orthopnea, paroxysmal nocturnal dyspnea. Now, he noticed leg edema for the past few days. He denies any chest pain, palpitations, dizziness syncope or presyncope. He was also not following low-salt diet. He came to the emergency room for further evaluation and was noted to have atrial fibrillation with rapid ventricular response he had a CTA chest which was negative for PE and the troponins were negative. Patient was initiated on Cardizem for  for rate control. His heart rate seems reasonably controlled now on 10 mg/hour of Cardizem infusion. He was also started on metoprolol 25 mg twice daily.   He is feeling much better after coming to the hospital.        Review of Systems:  Cardiac: As per HPI  General: No fever, chills  Pulmonary: As per HPI  GI: No nausea, vomiting  Musculoskeletal: HERRMANN x 4, no focal motor deficits  Skin: Intact, no rashes  Neuro/Psych: No headache or seizures     Physical Exam:  BP (!) 100/58 consulting us and will be following with you for any further recommendations.     Darlyn Valiente MD, [facility] [place], [address] [facility]) Cardiology                        Inpatient Cardiology Consultation      Reason for Consult:  Atrial Flutter and CHF    Consulting Physician: Dr. Stalin Andrade    Requesting Physician:  Dr. Lazarus Shear    Date of Consultation: 8/16/2020    HISTORY OF PRESENT ILLNESS:   Mr. Vineet Grigsby is a 64year old male who is previously not known to [facility]) Cardiology Physicians in [facility]. His medical history includes obesity, HTN, osteoarthritis, recent tobacco cessation. Mr. Vineet Grigsby presented to [facility] ED on 08/15/2020 with complaints of dyspnea. He states that he retired a year ago and since that time has gained 30 pounds. He states that with exertion he has severe right hip pain and was scheduled to have a right hip replacement, however this was cancelled due to Covid 19. He states that he has been \"eating terribly\" with increased sodium and drinking more fluids. He states that in the remote past he drank alcohol heavily, and resumed drinking \"Bobbi, but now just a few each day and I picked this back up 6 months ago\". He states that one month ago he was waking \"with acid in my throat, I was choking from it every morning\". He attributed this to GERD. He states that over the past 3 weeks he has been having dyspnea with worsening MARTINEZ, orthopnea, and PND. He states \"at first belching would help my breathing, but now I can't walk fast to the bathroom or go up stairs and I can barely breathe\". He states that over the past week his BLE edema \"went from going away over night to getting bigger and bigger and not going away at all\". He denies accompanying chest pain, palpitations, and feelings of his heart racing. Upon arrival to the ED his VS were 97.1-80-/106-98% on RA. EKG Atrial Flutter with CVR. WBC 4.6. H&H 13.9/41.9. BUN/SCr 14/1.2. Troponin <0.01. ProBNP 3313. Rapid Covid was negative.  CTA of the chest was negative for pulmonary emboli and showed interstitial edema, CHF, and hepatic steatosis. Vasyl received IV Lasix 40mg, Cardizem 25mg and was placed on a Cardizem gtt. He was admitted to a telemetry monitored unit. Cardiology was consulted by Dr. Anna Cleaning for management of Atrial Fibrillation and CHF. Please note: past medical records were reviewed per electronic medical record (EMR) - see detailed reports under Past Medical/ Surgical History. Past Medical and Surgical History:    1. HTN  2. ACE I Allergy (angioedema)  3. Obesity  4. Tobacco abuse: Quit smoking 07/2020  5. ETOH  6. Osteoarthritis  7. S/p Hernia repair, Lumbar back surgery (specifics unclear), and left hip replacement     Medications Prior to admit:  Prior to Admission medications    Medication Sig Start Date End Date Taking?  Authorizing Provider   amLODIPine (NORVASC) 2.5 MG tablet Take 5 mg by mouth daily   Yes Historical Provider, MD   naproxen (NAPROSYN) 500 MG tablet Take 1 tablet by mouth 2 times daily for 30 doses 10/16/17 10/31/17  Carolina Nixon PA-C   aspirin 325 MG EC tablet Take 1 tablet by mouth 2 times daily 9/16/16 10/16/17  Jovany Floyd MD       Current Medications:    Current Facility-Administered Medications: sodium chloride flush 0.9 % injection 10 mL, 10 mL, Intravenous, 2 times per day  sodium chloride flush 0.9 % injection 10 mL, 10 mL, Intravenous, PRN  potassium chloride (KLOR-CON M) extended release tablet 40 mEq, 40 mEq, Oral, PRN **OR** potassium bicarb-citric acid (EFFER-K) effervescent tablet 40 mEq, 40 mEq, Oral, PRN **OR** potassium chloride 10 mEq/100 mL IVPB (Peripheral Line), 10 mEq, Intravenous, PRN  magnesium sulfate 1 g in dextrose 5% 100 mL IVPB, 1 g, Intravenous, PRN  acetaminophen (TYLENOL) tablet 650 mg, 650 mg, Oral, Q6H PRN **OR** acetaminophen (TYLENOL) suppository 650 mg, 650 mg, Rectal, Q6H PRN  magnesium hydroxide (MILK OF MAGNESIA) 400 MG/5ML suspension 30 mL, 30 mL, Oral, Daily PRN  promethazine (PHENERGAN) tablet 12.5 mg, 12.5 mg, Oral, Q6H PRN **OR** ondansetron (ZOFRAN) injection 4 mg, 4 mg, Intravenous, Q6H PRN  enoxaparin (LOVENOX) injection 80 mg, 80 mg, Subcutaneous, BID  furosemide (LASIX) injection 40 mg, 40 mg, Intravenous, Daily  metoprolol succinate (TOPROL XL) extended release tablet 25 mg, 25 mg, Oral, Daily  spironolactone (ALDACTONE) tablet 25 mg, 25 mg, Oral, Daily  dilTIAZem 100 mg in dextrose 5 % 100 mL infusion (ADD-Bendena), 5 mg/hr, Intravenous, Continuous    Allergies:  Ace inhibitors and Lisinopril    Social History:    Quit smoking in 07/2020. Prior to that smoked 2 cigarettes a day (always <1/2ppd) for several years. States that he started drinking Bobbi several shots a day for the past 6 months. States that he \"drank heavily\" in the remote past.   Denies illicit drug and caffeine intake     Family History:   Denies family Hx of premature CAD or SCD. REVIEW OF SYSTEMS:     · Constitutional: Denies fevers, chills, night sweats, and fatigue  · HEENT: Denies headaches, nose bleeds, and blurred vision,oral pain, abscess or lesion. · Musculoskeletal: Denies falls, pain to BLE with ambulation and complains of edema to BLE. · Neurological: Denies dizziness and lightheadedness, numbness and tingling  · Cardiovascular: Denies chest pain, palpitations, and feelings of heart racing. · Respiratory: Complains of MARTINEZ, orthopnea and PND  · Gastrointestinal: Denies heartburn, nausea/vomiting, diarrhea and constipation, black/bloody, and tarry stools. · Genitourinary: Denies dysuria and hematuria  · Hematologic: Denies excessive bruising or bleeding  · Lymphatic: Denies lumps and bumps to neck, axilla, breast, and groin  · Endocrine: Denies excessive thirst. Denies intolerance to hot and cold  · Psychiatric: Denies anxiety and depression.     PHYSICAL EXAM:   /86   Pulse 88   Temp 98 °F (36.7 °C) (Oral)   Resp 18   Ht 6' (1.829 m)   Wt 238 lb 9.6 oz (108.2 kg)   SpO2 98%   BMI 32.36 kg/m²   CONST:  Well developed, obese, middle aged male who appears stated age. Awake, alert, cooperative, no apparent distress  HEENT:   Head- Normocephalic, atraumatic   Eyes- Conjunctivae pink, anicteric  Throat- Oral mucosa pink and moist  Neck-  No stridor, trachea midline, no jugular venous distention. No adenopathy   CHEST: Chest symmetrical and non-tender to palpation. No accessory muscle use or intercostal retractions  RESPIRATORY: Lung sounds - clear throughout fields   CARDIOVASCULAR:     No carotid bruit  Heart Inspection- shows no noted pulsations  Heart Palpation- no heaves or thrills; PMI is non-displaced   Heart Ausculation- Regular rate and rhythm, no murmur. No s3, s4 or rub   PV: 1-2+ pitting bialteral lower extremity edema R>L. No varicosities. Pedal pulses palpable, no clubbing or cyanosis   ABDOMEN: Soft, obese, non-tender to light palpation. Bowel sounds present. No palpable masses no organomegaly; no abdominal bruit  MS: Good muscle strength and tone. No atrophy or abnormal movements. : Deferred  SKIN: Warm and dry no statis dermatitis or ulcers   NEURO / PSYCH: Oriented to person, place and time. Speech clear and appropriate. Follows all commands.  Pleasant affect     DATA:    ECG: As above  Tele strips: Now A Flutter with HR in the 90s  Diagnostic:      Intake/Output Summary (Last 24 hours) at 8/16/2020 0901  Last data filed at 8/16/2020 0846  Gross per 24 hour   Intake 93 ml   Output 950 ml   Net -857 ml       Labs:   CBC:   Recent Labs     08/15/20  1948 08/16/20  0328   WBC 4.6 4.5   HGB 13.9 13.5   HCT 41.9 39.9    201     BMP:   Recent Labs     08/15/20  1948 08/16/20  0328    134   K 4.1 3.7   CO2 24 21*   BUN 14 15   CREATININE 1.2 1.2   LABGLOM >60 >60   CALCIUM 9.4 9.2   TSH:   Recent Labs     08/15/20  1948   TSH 3.300     HgA1c:   Lab Results   Component Value Date    LABA1C 4.6 08/16/2020   PT/INR:   Recent Labs     08/15/20  1948 PROTIME 12.8*   INR 1.1     APTT:  Recent Labs     08/15/20  1948   APTT 31.5     CARDIAC ENZYMES:  Recent Labs     08/15/20  1948 08/16/20  0328   TROPONINI <0.01 0.01     FASTING LIPID PANEL:  Lab Results   Component Value Date    HDL 57 08/16/2020    LDLCALC 83 08/16/2020     LIVER PROFILE:  Recent Labs     08/15/20  1948 08/16/20  0328   * 116*   * 232*   LABALBU 4.1 3.8     08/15/2020 CXR:   Mild pulmonary congestion without evidence of significant pulmonary edema or pleural effusion. 08/15/2020 BLE Ultrasound:  No evidence to suggest deep venous thrombosis of the bilateral lower extremities. 08/15/2020 CTA of Chest:   1. No pulmonary embolism. 2. Cardiomegaly with mild interstitial edema in the lungs indicating CHF exacerbation. 3. Hepatic steatosis.      IMPRESSION and PLAN to follow per Dr. Cal Matias    Electronically signed by ROSALINDA Reece CNP on 8/16/2020 at 9:01 AM

## 2020-08-16 NOTE — H&P
[address]: had concerns including Leg Swelling (bilateral, sob on exertion, x2 weeks) and Shortness of Breath. History of Present Illness:    Informant(s) for H&P:Pt and chart   Ron Newsome is a 64 y.o. male presented to the ER in [facility] with 2 week history of SOB, MARTINEZ, leg swelling. His exercise tolerance is decreased to minimal distance going to the bathroom would make him out of breath, as well as orthopnea. Patient just stopped drinking 3 days ago, used to be heavy drinker, 1 pint of london daily     in ER was found to have a flutter with RVR   Denied any fever, nausea, vomiting, diarrhea, abdominal pain, blood in stool or black stool.  Denied any chest pain, no recent lightheadedness or syncope   + orthopnea + MARTINEZ     CTA pulmonary   Impression:      1. No pulmonary embolism. 2. Cardiomegaly with mild interstitial edema in the lungs indicating CHF exacerbation. 3. Hepatic steatosis. US DVT legs   Impression:      No evidence to suggest deep venous thrombosis of the bilateral lower extremities. In ER:    Vitals /73   Pulse 109   Temp 97.1 °F (36.2 °C)   Resp 18   SpO2 95%   WBC, neutrophil %, neutrophil absolute count   Lab Results   Component Value Date    WBC 4.6 08/15/2020    NEUTOPHILPCT 55.9 08/15/2020    NEUTROABS 2.58 08/15/2020     Lactic acid No results found for: LACTSEPSIS  Cr   Lab Results   Component Value Date    CREATININE 1.2 08/15/2020        REVIEW OF SYSTEMS:  A comprehensive review of systems was negative except for: what is in the HPI    PMH:  Past Medical History:   Diagnosis Date    Arthritis     Hypertension     Preoperative clearance 08/18/2016    medical clearance on paper chart from Dr. Kyle Morocho.  Gerardo    Seasonal allergies        Surgical History:  Past Surgical History:   Procedure Laterality Date    BACK SURGERY      DILATATION, ESOPHAGUS      HERNIA REPAIR      HIP SURGERY  09/13/2016    left total; Results   Component Value Date    LABALBU 4.1 08/15/2020    LABALBU 3.5 09/15/2016    LABALBU 4.0 07/27/2016     Lactic acid   No results for input(s): LACTSEPSIS in the last 72 hours. Cardiac  Troponin   Lab Results   Component Value Date    TROPONINI <0.01 08/15/2020    TROPONINI <0.01 07/27/2016     Pro-BNP   Lab Results   Component Value Date    PROBNP 3,313 (H) 08/15/2020     Iron studies  No results found for: FERRITIN, IRON, TIBC    EKG:  A flutter , T wave inversions     ASSESSMENT and PLAN:      Problem   New Onset Atrial Flutter (Hcc)   Acute Decompensated Heart Failure (Hcc)   New Onset of Congestive Heart Failure (Hcc)   Essential Hypertension     New onset atrial flutter with RVR  New onset CHF  ADHF  [id number] Score for Atrial Fibrillation Stroke Risk   Risk   Factors  Component Value   C CHF Yes 1   H HTN Yes 1   A2 Age >= 75 No,  (61 y.o.) 0   D DM No 0   S2 Prior Stroke/TIA No 0   V Vascular Disease No 0   A Age 74-69 No,  (61 y.o.) 0   Sc Sex male 0    [id number]  Score  2     Patient just stopped drinking 3 days ago, used to be heavy drinker, 1 pint of london daily  · Currently on diltiazem drip, rate improved to 110  · Already received lasix IV 40 mg   · C.w lasix 40 daily   · Start LMWH 1 mg/kg q12h   · S.w metoprolol succinate 25 daily   · Pt has allergy to ACEI will avoid   · Start spironolactone 25 daily   · Echo   · Consult cardiology, may need cardioversion   · Will keep NPO in case stress test required/ VICTORIA cardioversion      Elevated liver enzymes  · Likely liver congestion       Code Status: Full   DVT prophylaxis: Lovenox   Diet: NPO       PLEASE NOTE:    This report was transcribed using typing and voice recognition software. Every effort was made to ensure accuracy; however, inadvertent computerized transcription errors may be present.    More than 50 minutes have been spent in evaluating the patient, reviewing records and results, discussing with staff / family and other treating physicians.     Sagar Robledo MD, MSc   [facility]

## 2020-08-17 LAB
ALBUMIN SERPL-MCNC: 3.9 G/DL (ref 3.5–5.2)
ALP BLD-CCNC: 44 U/L (ref 40–129)
ALT SERPL-CCNC: 192 U/L (ref 0–40)
ANION GAP SERPL CALCULATED.3IONS-SCNC: 13 MMOL/L (ref 7–16)
AST SERPL-CCNC: 91 U/L (ref 0–39)
BASOPHILS ABSOLUTE: 0.04 E9/L (ref 0–0.2)
BASOPHILS RELATIVE PERCENT: 0.9 % (ref 0–2)
BILIRUB SERPL-MCNC: 1.1 MG/DL (ref 0–1.2)
BILIRUBIN DIRECT: 0.4 MG/DL (ref 0–0.3)
BILIRUBIN, INDIRECT: 0.7 MG/DL (ref 0–1)
BUN BLDV-MCNC: 18 MG/DL (ref 6–20)
CALCIUM SERPL-MCNC: 9.1 MG/DL (ref 8.6–10.2)
CHLORIDE BLD-SCNC: 99 MMOL/L (ref 98–107)
CO2: 26 MMOL/L (ref 22–29)
CREAT SERPL-MCNC: 1.4 MG/DL (ref 0.7–1.2)
EOSINOPHILS ABSOLUTE: 0.14 E9/L (ref 0.05–0.5)
EOSINOPHILS RELATIVE PERCENT: 3.2 % (ref 0–6)
GFR AFRICAN AMERICAN: >60
GFR NON-AFRICAN AMERICAN: >60 ML/MIN/1.73
GLUCOSE BLD-MCNC: 96 MG/DL (ref 74–99)
HCT VFR BLD CALC: 40.4 % (ref 37–54)
HEMOGLOBIN: 13.6 G/DL (ref 12.5–16.5)
IMMATURE GRANULOCYTES #: 0.01 E9/L
IMMATURE GRANULOCYTES %: 0.2 % (ref 0–5)
LYMPHOCYTES ABSOLUTE: 1.71 E9/L (ref 1.5–4)
LYMPHOCYTES RELATIVE PERCENT: 39.4 % (ref 20–42)
MAGNESIUM: 2 MG/DL (ref 1.6–2.6)
MCH RBC QN AUTO: 34.9 PG (ref 26–35)
MCHC RBC AUTO-ENTMCNC: 33.7 % (ref 32–34.5)
MCV RBC AUTO: 103.6 FL (ref 80–99.9)
MONOCYTES ABSOLUTE: 0.54 E9/L (ref 0.1–0.95)
MONOCYTES RELATIVE PERCENT: 12.4 % (ref 2–12)
NEUTROPHILS ABSOLUTE: 1.9 E9/L (ref 1.8–7.3)
NEUTROPHILS RELATIVE PERCENT: 43.9 % (ref 43–80)
PDW BLD-RTO: 14.3 FL (ref 11.5–15)
PLATELET # BLD: 198 E9/L (ref 130–450)
PMV BLD AUTO: 11.8 FL (ref 7–12)
POTASSIUM REFLEX MAGNESIUM: 4 MMOL/L (ref 3.5–5)
RBC # BLD: 3.9 E12/L (ref 3.8–5.8)
SODIUM BLD-SCNC: 138 MMOL/L (ref 132–146)
TOTAL PROTEIN: 6.3 G/DL (ref 6.4–8.3)
WBC # BLD: 4.3 E9/L (ref 4.5–11.5)

## 2020-08-17 PROCEDURE — 85025 COMPLETE CBC W/AUTO DIFF WBC: CPT

## 2020-08-17 PROCEDURE — 80048 BASIC METABOLIC PNL TOTAL CA: CPT

## 2020-08-17 PROCEDURE — 80076 HEPATIC FUNCTION PANEL: CPT

## 2020-08-17 PROCEDURE — 99233 SBSQ HOSP IP/OBS HIGH 50: CPT | Performed by: INTERNAL MEDICINE

## 2020-08-17 PROCEDURE — 6360000002 HC RX W HCPCS: Performed by: INTERNAL MEDICINE

## 2020-08-17 PROCEDURE — 83735 ASSAY OF MAGNESIUM: CPT

## 2020-08-17 PROCEDURE — APPSS30 APP SPLIT SHARED TIME 16-30 MINUTES: Performed by: NURSE PRACTITIONER

## 2020-08-17 PROCEDURE — 2060000000 HC ICU INTERMEDIATE R&B

## 2020-08-17 PROCEDURE — 2580000003 HC RX 258: Performed by: INTERNAL MEDICINE

## 2020-08-17 PROCEDURE — 36415 COLL VENOUS BLD VENIPUNCTURE: CPT

## 2020-08-17 PROCEDURE — 6370000000 HC RX 637 (ALT 250 FOR IP): Performed by: NURSE PRACTITIONER

## 2020-08-17 RX ORDER — FUROSEMIDE 10 MG/ML
20 INJECTION INTRAMUSCULAR; INTRAVENOUS 2 TIMES DAILY
Status: DISCONTINUED | OUTPATIENT
Start: 2020-08-17 | End: 2020-08-18 | Stop reason: HOSPADM

## 2020-08-17 RX ORDER — DIGOXIN 0.25 MG/ML
250 INJECTION INTRAMUSCULAR; INTRAVENOUS EVERY 6 HOURS
Status: COMPLETED | OUTPATIENT
Start: 2020-08-17 | End: 2020-08-17

## 2020-08-17 RX ORDER — DIGOXIN 0.25 MG/ML
500 INJECTION INTRAMUSCULAR; INTRAVENOUS ONCE
Status: COMPLETED | OUTPATIENT
Start: 2020-08-17 | End: 2020-08-17

## 2020-08-17 RX ADMIN — DIGOXIN 250 MCG: 0.25 INJECTION INTRAMUSCULAR; INTRAVENOUS at 21:14

## 2020-08-17 RX ADMIN — SODIUM CHLORIDE, PRESERVATIVE FREE 10 ML: 5 INJECTION INTRAVENOUS at 21:16

## 2020-08-17 RX ADMIN — ENOXAPARIN SODIUM 80 MG: 80 INJECTION SUBCUTANEOUS at 21:16

## 2020-08-17 RX ADMIN — DIGOXIN 250 MCG: 0.25 INJECTION INTRAMUSCULAR; INTRAVENOUS at 16:15

## 2020-08-17 RX ADMIN — DIGOXIN 500 MCG: 0.25 INJECTION INTRAMUSCULAR; INTRAVENOUS at 09:34

## 2020-08-17 RX ADMIN — ENOXAPARIN SODIUM 80 MG: 80 INJECTION SUBCUTANEOUS at 08:48

## 2020-08-17 RX ADMIN — METOPROLOL SUCCINATE 50 MG: 50 TABLET, EXTENDED RELEASE ORAL at 21:13

## 2020-08-17 RX ADMIN — FUROSEMIDE 20 MG: 10 INJECTION, SOLUTION INTRAVENOUS at 18:18

## 2020-08-17 RX ADMIN — SODIUM CHLORIDE, PRESERVATIVE FREE 10 ML: 5 INJECTION INTRAVENOUS at 08:48

## 2020-08-17 RX ADMIN — FUROSEMIDE 40 MG: 10 INJECTION, SOLUTION INTRAMUSCULAR; INTRAVENOUS at 08:48

## 2020-08-17 RX ADMIN — METOPROLOL SUCCINATE 50 MG: 50 TABLET, EXTENDED RELEASE ORAL at 08:48

## 2020-08-17 ASSESSMENT — PAIN SCALES - GENERAL
PAINLEVEL_OUTOF10: 0

## 2020-08-17 NOTE — PLAN OF CARE
Problem: Cardiac:  Goal: Ability to maintain an adequate cardiac output will improve  Description: Ability to maintain an adequate cardiac output will improve  8/16/2020 2137 by Fara Hare RN  Outcome: Met This Shift  8/16/2020 1759 by Harshal Molina RN  Outcome: Met This Shift  Goal: Hemodynamic stability will improve  Description: Hemodynamic stability will improve  8/16/2020 2137 by Fara Hare RN  Outcome: Met This Shift  8/16/2020 1759 by Harshal Molina RN  Outcome: Met This Shift     Problem: Fluid Volume:  Goal: Ability to achieve and maintain adequate urine output will improve  Description: Ability to achieve and maintain adequate urine output will improve  8/16/2020 2137 by Fara Hare RN  Outcome: Met This Shift  8/16/2020 1759 by Harshal Molina RN  Outcome: Met This Shift

## 2020-08-17 NOTE — CARE COORDINATION
Social Work/Discharge Planning:  Met with patient and completed initial assessment. Explained Social Work role and discussed transition of care/discharge planning. COVID negative 8/15. Patient lives alone in two story house. PTA patient independent with no adaptive device. He has a bedside commode and a wheeled walker. He has a [facility] history with [address] He denies any snf history. Patient PCP is Dr. Gissell Horton and pharmacy is [facility] [address]. He plans to return home and denies any home care needs at this time. Will continue to follow and assist if needed.   Electronically signed by LYNETTE Rodriguez on 8/17/2020 at 10:27 AM

## 2020-08-17 NOTE — PLAN OF CARE
Problem: Cardiac:  Goal: Hemodynamic stability will improve  Description: Hemodynamic stability will improve  Outcome: Met This Shift

## 2020-08-17 NOTE — PROGRESS NOTES
affect  Peripheral Pulses: Intact posterior tibial pulses bilaterally    Intake/Output:    Intake/Output Summary (Last 24 hours) at 8/17/2020 0911  Last data filed at 8/17/2020 0853  Gross per 24 hour   Intake 640 ml   Output 2310 ml   Net -1670 ml     I/O this shift:  In: -   Out: 200 [Urine:200]    Laboratory Tests:  Recent Labs     08/15/20  1948 08/16/20  0328 08/17/20  0316    134 138   K 4.1 3.7 4.0   CL 99 100 99   CO2 24 21* 26   BUN 14 15 18   CREATININE 1.2 1.2 1.4*   GLUCOSE 130* 105* 96   CALCIUM 9.4 9.2 9.1     Lab Results   Component Value Date    MG 2.0 08/17/2020     Recent Labs     08/15/20  1948 08/16/20  0328 08/17/20  0316   ALKPHOS 51 48 44   * 232* 192*   * 116* 91*   PROT 7.0 6.4 6.3*   BILITOT 1.4* 1.1 1.1   BILIDIR  --  0.4* 0.4*   LABALBU 4.1 3.8 3.9     Recent Labs     08/15/20  1948 08/16/20  0328 08/17/20  0316   WBC 4.6 4.5 4.3*   RBC 4.00 3.88 3.90   HGB 13.9 13.5 13.6   HCT 41.9 39.9 40.4   .8* 102.8* 103.6*   MCH 34.8 34.8 34.9   MCHC 33.2 33.8 33.7   RDW 14.0 13.9 14.3    201 198   MPV 11.3 11.2 11.8     Lab Results   Component Value Date    TROPONINI 0.01 08/16/2020    TROPONINI <0.01 08/15/2020    TROPONINI <0.01 07/27/2016     Lab Results   Component Value Date    INR 1.1 08/15/2020    PROTIME 12.8 (H) 08/15/2020     Lab Results   Component Value Date    TSH 3.300 08/15/2020     Lab Results   Component Value Date    LABA1C 4.6 08/16/2020     No results found for: EAG  No results found for: CHOL  No results found for: TRIG  Lab Results   Component Value Date    HDL 57 08/16/2020     Lab Results   Component Value Date    LDLCALC 83 08/16/2020     Lab Results   Component Value Date    LABVLDL 15 08/16/2020     No results found for: CHOLHDLRATIO    Cardiac Tests:  Telemetry findings reviewed: A. fib with RVR with heart rate in the 100s no new tachy/bradyarrhythmias overnight     EKG: Atrial flutter with a 2-1 block, ST-T changes suggestive inferior wall ischemia.     Labs were reviewed: Blood pressure 100/58 with heart rate of 88>>>113/84 .     Labs were reviewed: proBNP 3313, troponin less than 0.01×2 BMP normal with a creatinine 1.2>>18/1.4. Cholesterol 155 LDL 83, HDL 57 triglycerides 74. AST//232>>192/91 CBC normal.  Urine drug screen negative. TSH 3.3. TTE- 8/16/2020:  Left ventricle is moderately enlarged . Ejection fraction is visually estimated at 20-25%. Atrial fibrillation may   affect the evaluation of LV systolic function. Overall ejection fraction severely decreased . Abnormal LV diastolic function with elevated filling pressures (E/e' >11). The left atrium is severely dilated. Mildly dilated right ventricle. Right ventricle global systolic function is low normal . TAPSE 17 mm. Moderate to severe mitral regurgitation with centrally directed jet. No hemodynamically significant aortic stenosis is present. Moderate tricuspid regurgitation. RVSP is 36 mmHg. Normal estimated PA systolic pressure. No evidence for hemodynamically significant pericardial effusion. No previous echo for comparison.          Assessment:  · New onset atrial fibrillation/Flutter with RVR  · [id number] Vasc score-2  · Decompensated heart failure with reduced EF  · New onset cardiomyopathy etiology unclear with an EF 20-25%  · ACC/AHA stage C., NYHA functional class III   · History of hypertension, improved  · History of tobacco use quit 2 weeks back   · Mild obesity  · Osteoarthritis of the hips  · History of angioedema secondary to ACE inhibitor therapy  · VHD: Mod to severe MR (functional) and mod TR, PASP 36 mmHg. · Abnormal LFTS mostly from congestive hepatomegaly, improving   · Mild ANTHONY secondary to diuretic therapy creat 1.2>>1. 4        Plan:   · Continue Lovenox for anticoagulation for now, he is scheduled for cath in AM, will hold AM dose.    · Echo results discussed with the patient and advised cardiac cath to rule out ischemic heart disease. Will schedule for cath in AM.  AUC score 8.   · Continue Toprol XL to 50 mg twice daily and Cardizem was stopped,  Heart rate has gone up today, will give dig 500 mcg IV now then 250 mcg x 2 then one po daily, If the heart rate does not improve then will add amiodarone. Plan for VICTORIA guided DCCV after cath with antiarrhythmic therapy. · Continue cardiac diet and restrict daily salt intake to less than 2 g  · No aldactone at this time. Sameera Funes MD., Fabio Arnold.   [facility]) Cardiology

## 2020-08-17 NOTE — PROGRESS NOTES
Theo Roy Hospitalist   Progress Note    Admitting Date and Time: 8/15/2020  7:04 PM  Admit Dx: Atrial flutter with rapid ventricular response ([address] [I48.92]  Atrial flutter with rapid ventricular response ([address] [I48.92]    Subjective:    Patient was admitted with Atrial flutter with rapid ventricular response ([address] [I48.92]  Atrial flutter with rapid ventricular response ([address] [I48.92]. Patient awake and alert with no complaints noted. Discussed results of echocardiogram with patient and daughter. Patient denies any shortness of breath, light headedness or chest pain. ROS: denies fever, chills, cp, sob, n/v, HA unless stated above.      sodium chloride flush  10 mL Intravenous 2 times per day    enoxaparin  80 mg Subcutaneous BID    furosemide  40 mg Intravenous BID    metoprolol succinate  50 mg Oral BID     sodium chloride flush, 10 mL, PRN  potassium chloride, 40 mEq, PRN    Or  potassium alternative oral replacement, 40 mEq, PRN    Or  potassium chloride, 10 mEq, PRN  magnesium sulfate, 1 g, PRN  acetaminophen, 650 mg, Q6H PRN    Or  acetaminophen, 650 mg, Q6H PRN  magnesium hydroxide, 30 mL, Daily PRN  promethazine, 12.5 mg, Q6H PRN    Or  ondansetron, 4 mg, Q6H PRN         Objective:    /84   Pulse 120   Temp 97.9 °F (36.6 °C) (Oral)   Resp 18   Ht 6' (1.829 m)   Wt 240 lb 1.6 oz (108.9 kg)   SpO2 95%   BMI 32.56 kg/m²   General Appearance: alert and oriented to person, place and time, well-developed and well-nourished, in no acute distress  Skin: warm and dry, no rash or erythema  Head: normocephalic and atraumatic  Eyes: pupils equal, round, and reactive to light and conjunctivae normal  ENT: hearing grossly normal bilaterally  Neck: neck supple and non tender without mass   Pulmonary/Chest: decreased breath sounds noted- but CTA bilaterally  Cardiovascular: intact distal pulses, abnormal rate tachycardia and atrial flutter  Abdomen: soft, non-tender, non-distended and bowel sounds normal  Extremities: no cyanosis, no clubbing and 2 + edema-  bilateral lower extremity   Musculoskeletal: normal range of motion, no joint swelling, deformity or tenderness  Neurologic: no cranial nerve deficit, gait and coordination normal and speech normal      Recent Labs     08/15/20  1948 08/16/20  0328 08/17/20  0316    134 138   K 4.1 3.7 4.0   CL 99 100 99   CO2 24 21* 26   BUN 14 15 18   CREATININE 1.2 1.2 1.4*   GLUCOSE 130* 105* 96   CALCIUM 9.4 9.2 9.1       Recent Labs     08/15/20  1948 08/16/20  0328 08/17/20  0316   WBC 4.6 4.5 4.3*   RBC 4.00 3.88 3.90   HGB 13.9 13.5 13.6   HCT 41.9 39.9 40.4   .8* 102.8* 103.6*   MCH 34.8 34.8 34.9   MCHC 33.2 33.8 33.7   RDW 14.0 13.9 14.3    201 198   MPV 11.3 11.2 11.8       Troponin:    Lab Results   Component Value Date    TROPONINI 0.01 08/16/2020     TTE: Echocardiogram        Conclusions        Summary    Left ventricle is moderately enlarged .    Ejection fraction is visually estimated at 20-25%. Atrial fibrillation may    affect the evaluation of LV systolic function.    Overall ejection fraction severely decreased .    Abnormal LV diastolic function with elevated filling pressures (E/e' >11).  The left atrium is severely dilated.    Mildly dilated right ventricle.    Right ventricle global systolic function is low normal . TAPSE 17 mm.    Moderate to severe mitral regurgitation with centrally directed jet.    No hemodynamically significant aortic stenosis is present.    Moderate tricuspid regurgitation.  RVSP is 36 mmHg. Normal estimated PA systolic pressure.    No evidence for hemodynamically significant pericardial effusion.    No previous echo for comparison. Radiology:   CTA PULMONARY W CONTRAST   Final Result      1. No pulmonary embolism. 2. Cardiomegaly with mild interstitial edema in the lungs indicating   CHF exacerbation. 3. Hepatic steatosis.             US DUP LOWER EXTREMITIES BILATERAL VENOUS Final Result   No evidence to suggest deep venous thrombosis of the bilateral lower   extremities. XR CHEST (2 VW)   Final Result   Mild pulmonary congestion without evidence of significant pulmonary   edema or pleural effusion. Assessment:    Principal Problem:    New onset atrial flutter (HCC)  Active Problems:    Essential hypertension    Acute decompensated heart failure ([address]    New onset of congestive heart failure (HCC)    Atrial flutter (HCC)    Acidosis    Elevated LFTs  Resolved Problems:    * No resolved hospital problems. *      Plan:  1. New onset atrial flutter - [id number] Vasc score 2 - no chest pain - remains in flutter with rapid rate - cardiology following - echocardiogram demonstrated EF 20-25% - heart cath planned for tomorrow - continue Toprol XL but discontinued Cardizem drip and start oral Lanoxin     2. New onset acute decompensated heart failure - BLE edema - continue IV Lasix     3. Acute kidney injury - most likely secondary to diuresing - will monitor closely     4. Essential hypertension - blood pressure well controlled     5. Elevated LFTs - trending down     6. Tobacco abuse - ?  Quit recently         Electronically signed by ROSALINDA Bucio CNP on 8/17/2020 at 8:35 AM

## 2020-08-18 ENCOUNTER — HOSPITAL ENCOUNTER (OUTPATIENT)
Dept: CARDIAC CATH/INVASIVE PROCEDURES | Age: 60
Setting detail: OBSERVATION
Discharge: ANOTHER HOSPITAL | End: 2020-08-18
Attending: INTERNAL MEDICINE | Admitting: INTERNAL MEDICINE
Payer: OTHER GOVERNMENT

## 2020-08-18 VITALS
RESPIRATION RATE: 18 BRPM | SYSTOLIC BLOOD PRESSURE: 139 MMHG | DIASTOLIC BLOOD PRESSURE: 92 MMHG | BODY MASS INDEX: 31.6 KG/M2 | WEIGHT: 233.3 LBS | HEIGHT: 72 IN | TEMPERATURE: 97.8 F | HEART RATE: 89 BPM | OXYGEN SATURATION: 98 %

## 2020-08-18 VITALS
SYSTOLIC BLOOD PRESSURE: 141 MMHG | DIASTOLIC BLOOD PRESSURE: 73 MMHG | WEIGHT: 188 LBS | OXYGEN SATURATION: 97 % | BODY MASS INDEX: 25.47 KG/M2 | HEIGHT: 72 IN | TEMPERATURE: 98.1 F | HEART RATE: 46 BPM | RESPIRATION RATE: 16 BRPM

## 2020-08-18 PROBLEM — I25.10 CAD IN NATIVE ARTERY: Status: ACTIVE | Noted: 2020-08-18

## 2020-08-18 LAB
ABO/RH: NORMAL
ALBUMIN SERPL-MCNC: 3.5 G/DL (ref 3.5–5.2)
ALP BLD-CCNC: 43 U/L (ref 40–129)
ALT SERPL-CCNC: 158 U/L (ref 0–40)
ANION GAP SERPL CALCULATED.3IONS-SCNC: 13 MMOL/L (ref 7–16)
ANTIBODY SCREEN: NORMAL
AST SERPL-CCNC: 74 U/L (ref 0–39)
BASOPHILS ABSOLUTE: 0.05 E9/L (ref 0–0.2)
BASOPHILS RELATIVE PERCENT: 1.3 % (ref 0–2)
BILIRUB SERPL-MCNC: 1.1 MG/DL (ref 0–1.2)
BILIRUBIN DIRECT: 0.3 MG/DL (ref 0–0.3)
BILIRUBIN, INDIRECT: 0.8 MG/DL (ref 0–1)
BUN BLDV-MCNC: 18 MG/DL (ref 6–20)
CALCIUM SERPL-MCNC: 8.9 MG/DL (ref 8.6–10.2)
CHLORIDE BLD-SCNC: 101 MMOL/L (ref 98–107)
CO2: 25 MMOL/L (ref 22–29)
CREAT SERPL-MCNC: 1.3 MG/DL (ref 0.7–1.2)
EOSINOPHILS ABSOLUTE: 0.17 E9/L (ref 0.05–0.5)
EOSINOPHILS RELATIVE PERCENT: 4.5 % (ref 0–6)
GFR AFRICAN AMERICAN: >60
GFR NON-AFRICAN AMERICAN: >60 ML/MIN/1.73
GLUCOSE BLD-MCNC: 85 MG/DL (ref 74–99)
HCT VFR BLD CALC: 41.8 % (ref 37–54)
HEMOGLOBIN: 14.2 G/DL (ref 12.5–16.5)
IMMATURE GRANULOCYTES #: 0.02 E9/L
IMMATURE GRANULOCYTES %: 0.5 % (ref 0–5)
LYMPHOCYTES ABSOLUTE: 1.56 E9/L (ref 1.5–4)
LYMPHOCYTES RELATIVE PERCENT: 41.5 % (ref 20–42)
MCH RBC QN AUTO: 35 PG (ref 26–35)
MCHC RBC AUTO-ENTMCNC: 34 % (ref 32–34.5)
MCV RBC AUTO: 103 FL (ref 80–99.9)
MONOCYTES ABSOLUTE: 0.59 E9/L (ref 0.1–0.95)
MONOCYTES RELATIVE PERCENT: 15.7 % (ref 2–12)
NEUTROPHILS ABSOLUTE: 1.37 E9/L (ref 1.8–7.3)
NEUTROPHILS RELATIVE PERCENT: 36.5 % (ref 43–80)
PDW BLD-RTO: 13.5 FL (ref 11.5–15)
PLATELET # BLD: 209 E9/L (ref 130–450)
PMV BLD AUTO: 11.5 FL (ref 7–12)
POTASSIUM REFLEX MAGNESIUM: 3.7 MMOL/L (ref 3.5–5)
RBC # BLD: 4.06 E12/L (ref 3.8–5.8)
SODIUM BLD-SCNC: 139 MMOL/L (ref 132–146)
TOTAL PROTEIN: 6 G/DL (ref 6.4–8.3)
WBC # BLD: 3.8 E9/L (ref 4.5–11.5)

## 2020-08-18 PROCEDURE — 2709999900 HC NON-CHARGEABLE SUPPLY

## 2020-08-18 PROCEDURE — 86901 BLOOD TYPING SEROLOGIC RH(D): CPT

## 2020-08-18 PROCEDURE — 86850 RBC ANTIBODY SCREEN: CPT

## 2020-08-18 PROCEDURE — C1769 GUIDE WIRE: HCPCS

## 2020-08-18 PROCEDURE — 80076 HEPATIC FUNCTION PANEL: CPT

## 2020-08-18 PROCEDURE — 36415 COLL VENOUS BLD VENIPUNCTURE: CPT

## 2020-08-18 PROCEDURE — 99239 HOSP IP/OBS DSCHRG MGMT >30: CPT | Performed by: INTERNAL MEDICINE

## 2020-08-18 PROCEDURE — 86900 BLOOD TYPING SEROLOGIC ABO: CPT

## 2020-08-18 PROCEDURE — C1894 INTRO/SHEATH, NON-LASER: HCPCS

## 2020-08-18 PROCEDURE — 2580000003 HC RX 258: Performed by: INTERNAL MEDICINE

## 2020-08-18 PROCEDURE — 6360000002 HC RX W HCPCS: Performed by: INTERNAL MEDICINE

## 2020-08-18 PROCEDURE — 80048 BASIC METABOLIC PNL TOTAL CA: CPT

## 2020-08-18 PROCEDURE — 93458 L HRT ARTERY/VENTRICLE ANGIO: CPT

## 2020-08-18 PROCEDURE — G0379 DIRECT REFER HOSPITAL OBSERV: HCPCS

## 2020-08-18 PROCEDURE — 2500000003 HC RX 250 WO HCPCS

## 2020-08-18 PROCEDURE — 85025 COMPLETE CBC W/AUTO DIFF WBC: CPT

## 2020-08-18 PROCEDURE — C1887 CATHETER, GUIDING: HCPCS

## 2020-08-18 PROCEDURE — 93458 L HRT ARTERY/VENTRICLE ANGIO: CPT | Performed by: INTERNAL MEDICINE

## 2020-08-18 PROCEDURE — 6360000002 HC RX W HCPCS

## 2020-08-18 PROCEDURE — 99232 SBSQ HOSP IP/OBS MODERATE 35: CPT | Performed by: INTERNAL MEDICINE

## 2020-08-18 PROCEDURE — 6370000000 HC RX 637 (ALT 250 FOR IP): Performed by: INTERNAL MEDICINE

## 2020-08-18 PROCEDURE — G0378 HOSPITAL OBSERVATION PER HR: HCPCS

## 2020-08-18 PROCEDURE — 6370000000 HC RX 637 (ALT 250 FOR IP): Performed by: NURSE PRACTITIONER

## 2020-08-18 PROCEDURE — APPSS30 APP SPLIT SHARED TIME 16-30 MINUTES: Performed by: NURSE PRACTITIONER

## 2020-08-18 RX ORDER — FUROSEMIDE 10 MG/ML
20 INJECTION INTRAMUSCULAR; INTRAVENOUS 2 TIMES DAILY
Status: CANCELLED | OUTPATIENT
Start: 2020-08-19

## 2020-08-18 RX ORDER — SODIUM CHLORIDE 9 MG/ML
INJECTION, SOLUTION INTRAVENOUS ONCE
Status: COMPLETED | OUTPATIENT
Start: 2020-08-18 | End: 2020-08-18

## 2020-08-18 RX ORDER — ACETAMINOPHEN 325 MG/1
650 TABLET ORAL EVERY 6 HOURS PRN
Status: CANCELLED | OUTPATIENT
Start: 2020-08-18

## 2020-08-18 RX ORDER — METOPROLOL SUCCINATE 50 MG/1
50 TABLET, EXTENDED RELEASE ORAL 2 TIMES DAILY
Status: CANCELLED | OUTPATIENT
Start: 2020-08-18

## 2020-08-18 RX ORDER — ACETAMINOPHEN 325 MG/1
650 TABLET ORAL EVERY 6 HOURS PRN
Status: DISCONTINUED | OUTPATIENT
Start: 2020-08-18 | End: 2020-08-19 | Stop reason: HOSPADM

## 2020-08-18 RX ORDER — POTASSIUM CHLORIDE 7.45 MG/ML
10 INJECTION INTRAVENOUS PRN
Status: CANCELLED | OUTPATIENT
Start: 2020-08-18

## 2020-08-18 RX ORDER — SODIUM CHLORIDE 0.9 % (FLUSH) 0.9 %
10 SYRINGE (ML) INJECTION EVERY 12 HOURS SCHEDULED
Status: DISCONTINUED | OUTPATIENT
Start: 2020-08-18 | End: 2020-08-19 | Stop reason: HOSPADM

## 2020-08-18 RX ORDER — MAGNESIUM SULFATE 1 G/100ML
1 INJECTION INTRAVENOUS PRN
Status: CANCELLED | OUTPATIENT
Start: 2020-08-18

## 2020-08-18 RX ORDER — ACETAMINOPHEN 650 MG/1
650 SUPPOSITORY RECTAL EVERY 6 HOURS PRN
Status: CANCELLED | OUTPATIENT
Start: 2020-08-18

## 2020-08-18 RX ORDER — SODIUM CHLORIDE 0.9 % (FLUSH) 0.9 %
10 SYRINGE (ML) INJECTION EVERY 12 HOURS SCHEDULED
Status: CANCELLED | OUTPATIENT
Start: 2020-08-18

## 2020-08-18 RX ORDER — ONDANSETRON 2 MG/ML
4 INJECTION INTRAMUSCULAR; INTRAVENOUS EVERY 6 HOURS PRN
Status: DISCONTINUED | OUTPATIENT
Start: 2020-08-18 | End: 2020-08-19 | Stop reason: HOSPADM

## 2020-08-18 RX ORDER — METOPROLOL SUCCINATE 50 MG/1
50 TABLET, EXTENDED RELEASE ORAL 2 TIMES DAILY
Status: DISCONTINUED | OUTPATIENT
Start: 2020-08-18 | End: 2020-08-19 | Stop reason: HOSPADM

## 2020-08-18 RX ORDER — ONDANSETRON 2 MG/ML
4 INJECTION INTRAMUSCULAR; INTRAVENOUS EVERY 6 HOURS PRN
Status: CANCELLED | OUTPATIENT
Start: 2020-08-18

## 2020-08-18 RX ORDER — SODIUM CHLORIDE 0.9 % (FLUSH) 0.9 %
10 SYRINGE (ML) INJECTION PRN
Status: DISCONTINUED | OUTPATIENT
Start: 2020-08-18 | End: 2020-08-19 | Stop reason: HOSPADM

## 2020-08-18 RX ORDER — ACETAMINOPHEN 650 MG/1
650 SUPPOSITORY RECTAL EVERY 6 HOURS PRN
Status: DISCONTINUED | OUTPATIENT
Start: 2020-08-18 | End: 2020-08-19 | Stop reason: HOSPADM

## 2020-08-18 RX ORDER — POTASSIUM CHLORIDE 20 MEQ/1
40 TABLET, EXTENDED RELEASE ORAL PRN
Status: CANCELLED | OUTPATIENT
Start: 2020-08-18

## 2020-08-18 RX ORDER — POTASSIUM CHLORIDE 20 MEQ/1
40 TABLET, EXTENDED RELEASE ORAL PRN
Status: DISCONTINUED | OUTPATIENT
Start: 2020-08-18 | End: 2020-08-19 | Stop reason: HOSPADM

## 2020-08-18 RX ORDER — MAGNESIUM SULFATE 1 G/100ML
1 INJECTION INTRAVENOUS PRN
Status: DISCONTINUED | OUTPATIENT
Start: 2020-08-18 | End: 2020-08-19 | Stop reason: HOSPADM

## 2020-08-18 RX ORDER — PROMETHAZINE HYDROCHLORIDE 25 MG/1
12.5 TABLET ORAL EVERY 6 HOURS PRN
Status: CANCELLED | OUTPATIENT
Start: 2020-08-18

## 2020-08-18 RX ORDER — POTASSIUM CHLORIDE 7.45 MG/ML
10 INJECTION INTRAVENOUS PRN
Status: DISCONTINUED | OUTPATIENT
Start: 2020-08-18 | End: 2020-08-19 | Stop reason: HOSPADM

## 2020-08-18 RX ORDER — DIGOXIN 125 MCG
125 TABLET ORAL DAILY
Status: DISCONTINUED | OUTPATIENT
Start: 2020-08-19 | End: 2020-08-19 | Stop reason: HOSPADM

## 2020-08-18 RX ORDER — DIGOXIN 125 MCG
125 TABLET ORAL DAILY
Status: DISCONTINUED | OUTPATIENT
Start: 2020-08-18 | End: 2020-08-18 | Stop reason: HOSPADM

## 2020-08-18 RX ORDER — ACETAMINOPHEN 325 MG/1
650 TABLET ORAL EVERY 4 HOURS PRN
Status: DISCONTINUED | OUTPATIENT
Start: 2020-08-18 | End: 2020-08-18 | Stop reason: SDUPTHER

## 2020-08-18 RX ORDER — SODIUM CHLORIDE 0.9 % (FLUSH) 0.9 %
10 SYRINGE (ML) INJECTION PRN
Status: CANCELLED | OUTPATIENT
Start: 2020-08-18

## 2020-08-18 RX ORDER — DIGOXIN 125 MCG
125 TABLET ORAL DAILY
Status: CANCELLED | OUTPATIENT
Start: 2020-08-19

## 2020-08-18 RX ORDER — FUROSEMIDE 10 MG/ML
20 INJECTION INTRAMUSCULAR; INTRAVENOUS 2 TIMES DAILY
Status: DISCONTINUED | OUTPATIENT
Start: 2020-08-19 | End: 2020-08-19 | Stop reason: HOSPADM

## 2020-08-18 RX ORDER — PROMETHAZINE HYDROCHLORIDE 25 MG/1
12.5 TABLET ORAL EVERY 6 HOURS PRN
Status: DISCONTINUED | OUTPATIENT
Start: 2020-08-18 | End: 2020-08-19 | Stop reason: HOSPADM

## 2020-08-18 RX ADMIN — SODIUM CHLORIDE: 9 INJECTION, SOLUTION INTRAVENOUS at 15:13

## 2020-08-18 RX ADMIN — METOPROLOL SUCCINATE 50 MG: 50 TABLET, EXTENDED RELEASE ORAL at 08:08

## 2020-08-18 RX ADMIN — SODIUM CHLORIDE, PRESERVATIVE FREE 10 ML: 5 INJECTION INTRAVENOUS at 08:10

## 2020-08-18 RX ADMIN — FUROSEMIDE 20 MG: 10 INJECTION, SOLUTION INTRAVENOUS at 08:09

## 2020-08-18 RX ADMIN — DIGOXIN 125 MCG: 125 TABLET ORAL at 08:08

## 2020-08-18 ASSESSMENT — PAIN SCALES - GENERAL
PAINLEVEL_OUTOF10: 0

## 2020-08-18 NOTE — DISCHARGE SUMMARY
[facility] Physician Discharge Summary       Magdaleno Marie MD  [id number] [place] [address]  [phone]            Activity level: As tolerated     Dispo: Transfer to [facility]    Condition on discharge: Stable     Patient ID:  Marquise Canales  [id number]  26 y.o.  1960    Admit date: 8/15/2020    Discharge date and time:  8/18/2020  6:40 PM    Admission Diagnoses: Principal Problem:    New onset atrial flutter ([address]  Active Problems:    Essential hypertension    Acute decompensated heart failure ([address]    New onset of congestive heart failure (HCC)    Atrial flutter with rapid ventricular response (HCC)    Acidosis    Elevated LFTs  Resolved Problems:    * No resolved hospital problems. *      Discharge Diagnoses: Principal Problem:    New onset atrial flutter (HCC)  Active Problems:    Essential hypertension    Acute decompensated heart failure ([address]    New onset of congestive heart failure (HCC)    Atrial flutter with rapid ventricular response (HCC)    Acidosis    Elevated LFTs  Resolved Problems:    * No resolved hospital problems. *      Consults:  IP CONSULT TO CARDIOLOGY      Hospital Course:   Patient Marquise Canales is a 64 y.o. presented with Atrial flutter with rapid ventricular response ([address] [I48.92]  Atrial flutter with rapid ventricular response ([address] [I48.92]      1. A. fib with RVR, rate is controlled now, Toprol-XL increased to 50 mg twice daily, weaned off Cardizem drip, chads-vas score of 2, needs anticoagulation, continue Lovenox subcutaneously for now as the patient is scheduled for a cardiac cath today. Plans for VICTORIA DC cardioversion after cath with antiarrhythmic therapy. Patient was transferred to [facility] for the cardiac cath and we were just informed that the patient is going to stay there, will discharge patient from our facility  2.   New onset cardiomyopathy, echo showed ejection fraction of 20 to 25%, need to rule out ischemic etiology, patient is for cardiac cath today, for now continue on diuretics. Fluid balance -2.3 L.  3.  Acute renal failure, most likely due to diuretics, creatinine is at 1.3, monitor    Discharge Exam:    General Appearance: alert and oriented to person, place and time and in no acute distress  Skin: warm and dry  Head: normocephalic and atraumatic  Eyes: pupils equal, round, and reactive to light, extraocular eye movements intact, conjunctivae normal  Neck: neck supple and non tender without mass   Pulmonary/Chest: with some crackles  bilaterally- no wheezes, normal air movement, no respiratory distress  Cardiovascular: normal rate, normal S1 and S2 and no carotid bruits  Abdomen: soft, non-tender, non-distended, normal bowel sounds, no masses or organomegaly  Extremities: no cyanosis, no clubbing and +2 edema  Neurologic: no cranial nerve deficit and speech normal    I/O last 3 completed shifts: In: 240 [P.O.:240]  Out: 325 [Urine:325]  No intake/output data recorded. LABS:  Recent Labs     20  0515    138 139   K 3.7 4.0 3.7    99 101   CO2 21* 26 25   BUN 15 18 18   CREATININE 1.2 1.4* 1.3*   GLUCOSE 105* 96 85   CALCIUM 9.2 9.1 8.9       Recent Labs     206 20  0515   WBC 4.5 4.3* 3.8*   RBC 3.88 3.90 4.06   HGB 13.5 13.6 14.2   HCT 39.9 40.4 41.8   .8* 103.6* 103.0*   MCH 34.8 34.9 35.0   MCHC 33.8 33.7 34.0   RDW 13.9 14.3 13.5    198 209   MPV 11.2 11.8 11.5       No results for input(s): POCGLU in the last 72 hours. Imaging:  Xr Chest (2 Vw)    Result Date: 8/15/2020  Patient MRN: [id number] : 1960 Age:  64 years Gender: Male Order Date: 8/15/2020 8:14 PM Exam: XR CHEST (2 VW) Number of Images: 1 view Indication:   SOB, CHF SOB, CHF Comparison: None. FINDINGS: Normal size of the cardiomediastinal silhouette. No evidence of airspace disease. Prominence of the pulmonary vasculature.  No effusion or pneumothorax. Bones are unremarkable. Mild pulmonary congestion without evidence of significant pulmonary edema or pleural effusion. Cta Pulmonary W Contrast    Result Date: 8/15/2020  Patient MRN:  [id number] : 1960 Age: 64 years Gender: Male Order Date:  8/15/2020 9:51 PM EXAM: CTA PULMONARY W CONTRAST INDICATION:  SOB, PE After Cr results SOB, PE COMPARISON: None Technique: Low-dose CT  acquisition technique included one of following options; 1 . Automated exposure control, 2. Adjustment of MA and or KV according to patient's size or 3. Use of iterative reconstruction. Contiguous spiral images were obtained in the axial plane, following the administration of intravenous contrast using CT angiographic protocol. Sagittal and coronal images were reconstructed from the axial plane acquisition. Addition MIP reconstructions were presented to aid in the interpretation of this study. FINDINGS: No pulmonary embolism is seen. The pulmonary arterial trunk is of normal caliber. The heart is enlarged. Minimal calcified atherosclerosis is seen in the thoracic aorta which is mildly tortuous but not aneurysmal. Mild interstitial edema is seen in the lungs. No consolidation is seen. No nodule or mass is identified. The central airway is clear. No pneumothorax or pleural effusion. No lymphadenopathy is seen in the chest. A small hiatal hernia is seen. The upper abdomen included in the field-of-view of this study demonstrates no acute abnormality. Hepatic steatosis is noted. Reflux of contrast into the hepatic veins likely signifies right heart failure. Evaluation of the bones reveals no fracture or destructive lesion. 1. No pulmonary embolism. 2. Cardiomegaly with mild interstitial edema in the lungs indicating CHF exacerbation. 3. Hepatic steatosis.      Us Dup Lower Extremities Bilateral Venous    Result Date: 8/15/2020  Patient MRN:  [id number] : 1960 Age: 64 years Gender: Male Order Date: 8/15/2020 9:23 PM EXAM: US DUP LOWER EXTREMITIES BILATERAL VENOUS COMPARISON: None INDICATION:  edema, new edema, new FINDINGS: Normal compression and augmentation is seen. No evidence to suggest deep venous thrombosis. No additional sonographic abnormalities. No evidence to suggest deep venous thrombosis of the bilateral lower extremities.        Patient Instructions:      Medication List      ASK your doctor about these medications    amLODIPine 2.5 MG tablet  Commonly known as:  NORVASC     aspirin 325 MG EC tablet  Take 1 tablet by mouth 2 times daily     naproxen 500 MG tablet  Commonly known as:  Naprosyn  Take 1 tablet by mouth 2 times daily for 30 doses              Note that more than 30 minutes was spent in preparing discharge papers, discussing discharge with patient, medication review, etc.    Signed:  Electronically signed by Hector Parks MD on 8/18/2020 at 6:40 PM

## 2020-08-18 NOTE — PROGRESS NOTES
Called physicians ambulance to set up transport, to arrange for patient to be at Cath lab by 3p   They will be here by 2p

## 2020-08-18 NOTE — PROGRESS NOTES
INPATIENT CARDIOLOGY FOLLOW-UP    Name: Shira Aguayo    Age: 64 y.o. Date of Admission: 8/15/2020  7:04 PM    Date of Service: 8/18/2020    Chief Complaint: Follow-up for Atrial fibrillation with RVR and CHF    Interim History:  No new overnight cardiac complaints and feeling much better. Currently with no complaints of CP, SOB, palpitations, dizziness, or lightheadedness. Aflutter on telemetry. Review of Systems:   Cardiac: As per HPI  General: No fever, chills  Pulmonary: As per HPI  HEENT: No visual disturbances, difficult swallowing  GI: No nausea, vomiting  Endocrine: No thyroid disease or DM  Musculoskeletal: HERRMANN x 4, no focal motor deficits  Skin: Intact, no rashes  Neuro/Psych: No headache or seizures    Problem List:  Patient Active Problem List   Diagnosis    Primary osteoarthritis of left hip    Essential hypertension    Acute blood loss as cause of postoperative anemia    Osteoarthritis of multiple joints    New onset atrial flutter (HCC)    Acute decompensated heart failure ([address]    New onset of congestive heart failure (HCC)    Atrial flutter with rapid ventricular response (HCC)    Acidosis    Elevated LFTs       Allergies:   Allergies   Allergen Reactions    Ace Inhibitors Swelling    Lisinopril Swelling     Angioedema       Current Medications:  Current Facility-Administered Medications   Medication Dose Route Frequency Provider Last Rate Last Dose    furosemide (LASIX) injection 20 mg  20 mg Intravenous BID Angelica Chase MD   20 mg at 08/17/20 1818    sodium chloride flush 0.9 % injection 10 mL  10 mL Intravenous 2 times per day Brennon Sebastian MD   10 mL at 08/17/20 2116    sodium chloride flush 0.9 % injection 10 mL  10 mL Intravenous PRN Brennon Sebastian MD        potassium chloride (KLOR-CON M) extended release tablet 40 mEq  40 mEq Oral PRN Brennon Sebastian MD        Or    potassium bicarb-citric acid (EFFER-K) effervescent tablet 40 mEq  40 mEq Oral PRN Brennon Sebastian MD Or    potassium chloride 10 mEq/100 mL IVPB (Peripheral Line)  10 mEq Intravenous PRN Socrates Atkins MD        magnesium sulfate 1 g in dextrose 5% 100 mL IVPB  1 g Intravenous PRN Socrates Atkins MD        acetaminophen (TYLENOL) tablet 650 mg  650 mg Oral Q6H PRN Socrates Atkins MD        Or    acetaminophen (TYLENOL) suppository 650 mg  650 mg Rectal Q6H PRN Socrates Atkins MD        magnesium hydroxide (MILK OF MAGNESIA) 400 MG/5ML suspension 30 mL  30 mL Oral Daily PRN Socrates Atkins MD        promethazine (PHENERGAN) tablet 12.5 mg  12.5 mg Oral Q6H PRN Rahel Mcginnis MD        Or    ondansetron (ZOFRAN) injection 4 mg  4 mg Intravenous Q6H PRN Rahel Mcginnis MD        enoxaparin (LOVENOX) injection 80 mg  80 mg Subcutaneous BID Bakari Marx MD   80 mg at 08/17/20 2116    metoprolol succinate (TOPROL XL) extended release tablet 50 mg  50 mg Oral BID ROSALINDA Delong CNP   50 mg at 08/17/20 2113    dilTIAZem 100 mg in dextrose 5 % 100 mL infusion ([facility])  5 mg/hr Intravenous Continuous Odalis Carcamo MD   Stopped at 08/16/20 1403      dilTIAZem Stopped (08/16/20 1403)       Physical Exam:  /83   Pulse 92   Temp 97.4 °F (36.3 °C) (Oral)   Resp 18   Ht 6' (1.829 m)   Wt 233 lb 4.8 oz (105.8 kg)   SpO2 96%   BMI 31.64 kg/m²   Wt Readings from Last 3 Encounters:   08/18/20 233 lb 4.8 oz (105.8 kg)   10/16/17 188 lb (85.3 kg)   09/16/17 188 lb (85.3 kg)     Appearance: Awake, alert, no acute respiratory distress  Skin: Intact, no rash  Head: Normocephalic, atraumatic  Eyes: EOMI, no conjunctival erythema  ENMT: No pharyngeal erythema, MMM, no rhinorrhea  Neck: Supple, no elevated JVP, no carotid bruits  Lungs: Clear to auscultation bilaterally. No wheezes, rales, or rhonchi.   Cardiac: Regular rate and rhythm tachycardic, +S1S2, no murmurs apparent  Abdomen: Soft, nontender, +bowel sounds  Extremities: Moves all extremities x 4, no lower extremity edema  Neurologic: No focal motor deficits apparent, normal mood and affect  Peripheral Pulses: Intact posterior tibial pulses bilaterally    Intake/Output:    Intake/Output Summary (Last 24 hours) at 8/18/2020 0731  Last data filed at 8/17/2020 1655  Gross per 24 hour   Intake 480 ml   Output 200 ml   Net 280 ml     No intake/output data recorded.     Laboratory Tests:  Recent Labs     08/16/20 0328 08/17/20 0316 08/18/20  0515    138 139   K 3.7 4.0 3.7    99 101   CO2 21* 26 25   BUN 15 18 18   CREATININE 1.2 1.4* 1.3*   GLUCOSE 105* 96 85   CALCIUM 9.2 9.1 8.9     Lab Results   Component Value Date    MG 2.0 08/17/2020     Recent Labs     08/16/20 0328 08/17/20 0316 08/18/20  0515   ALKPHOS 48 44 43   * 192* 158*   * 91* 74*   PROT 6.4 6.3* 6.0*   BILITOT 1.1 1.1 1.1   BILIDIR 0.4* 0.4* 0.3   LABALBU 3.8 3.9 3.5     Recent Labs     08/16/20 0328 08/17/20 0316 08/18/20  0515   WBC 4.5 4.3* 3.8*   RBC 3.88 3.90 4.06   HGB 13.5 13.6 14.2   HCT 39.9 40.4 41.8   .8* 103.6* 103.0*   MCH 34.8 34.9 35.0   MCHC 33.8 33.7 34.0   RDW 13.9 14.3 13.5    198 209   MPV 11.2 11.8 11.5     Lab Results   Component Value Date    TROPONINI 0.01 08/16/2020    TROPONINI <0.01 08/15/2020    TROPONINI <0.01 07/27/2016     Lab Results   Component Value Date    INR 1.1 08/15/2020    PROTIME 12.8 (H) 08/15/2020     Lab Results   Component Value Date    TSH 3.300 08/15/2020     Lab Results   Component Value Date    LABA1C 4.6 08/16/2020     No results found for: EAG  No results found for: CHOL  No results found for: TRIG  Lab Results   Component Value Date    HDL 57 08/16/2020     Lab Results   Component Value Date    LDLCALC 83 08/16/2020     Lab Results   Component Value Date    LABVLDL 15 08/16/2020     No results found for: CHOLHDLRATIO    Cardiac Tests:  Telemetry findings reviewed: A. flutter with CVR with heart rate in the 80s no new tachy/bradyarrhythmias overnight     EKG: Atrial flutter with a 2-1 block, ST-T changes suggestive inferior wall ischemia.     Labs were reviewed: Blood pressure 100/58 with heart rate of 88>>>113/84 >>.     Labs were reviewed: proBNP 3313, troponin less than 0.01×2 BMP normal with a creatinine 1.2>>18/1.4. Cholesterol 155 LDL 83, HDL 57 triglycerides 74. AST//232>>192/91 CBC normal.  Urine drug screen negative. TSH 3.3. TTE- 8/16/2020:  Left ventricle is moderately enlarged . Ejection fraction is visually estimated at 20-25%. Atrial fibrillation may   affect the evaluation of LV systolic function. Overall ejection fraction severely decreased . Abnormal LV diastolic function with elevated filling pressures (E/e' >11). The left atrium is severely dilated. Mildly dilated right ventricle. Right ventricle global systolic function is low normal . TAPSE 17 mm. Moderate to severe mitral regurgitation with centrally directed jet. No hemodynamically significant aortic stenosis is present. Moderate tricuspid regurgitation. RVSP is 36 mmHg. Normal estimated PA systolic pressure. No evidence for hemodynamically significant pericardial effusion. No previous echo for comparison.          Assessment:  · New onset atrial fibrillation/Flutter with RVR>>controlled. · [id number] Vasc score-2  · Decompensated heart failure with reduced EF  · New onset cardiomyopathy etiology unclear with an EF 20-25%  · ACC/AHA stage C., NYHA functional class III   · History of hypertension, improved  · History of tobacco use quit 2 weeks back   · Mild obesity  · Osteoarthritis of the hips  · History of angioedema secondary to ACE inhibitor therapy  · VHD: Mod to severe MR (functional) and mod TR, PASP 36 mmHg. · Abnormal LFTS mostly from congestive hepatomegaly, improving   · Mild ANTHONY secondary to diuretic therapy creat 1.2>>1.4>>1. 3        Plan:   · Continue Lovenox for anticoagulation for now, he is scheduled for cath in AM, will hold AM dose. · Cardiac cath today to rule out ischemic heart disease. Will schedule for cath in AM.  AUC score 8.   · Continue Toprol XL to 50 mg twice daily and Cardizem was stopped, will add dig 125 mcg po daily  · Plan for VICTORIA guided DCCV after cath with antiarrhythmic therapy. · Will add Hydralazine and Isordil after the cath  · Continue cardiac diet and restrict daily salt intake to less than 2 g  · No aldactone at this time. Fausto Rajput MD., [facility].   [facility]) Cardiology

## 2020-08-18 NOTE — PLAN OF CARE
Problem: Cardiac:  Goal: Hemodynamic stability will improve  Description: Hemodynamic stability will improve  8/18/2020 1501 by Dennis Maya RN  Outcome: Met This Shift     Problem: Fluid Volume:  Goal: Ability to achieve and maintain adequate urine output will improve  Description: Ability to achieve and maintain adequate urine output will improve  8/18/2020 1501 by Dennis Maya RN  Outcome: Met This Shift

## 2020-08-18 NOTE — PROCEDURES
[address]                  [address] [place],  [facility]                            CARDIAC CATHETERIZATION    PATIENT NAME: Riki Reis                         :        1960  MED REC NO:   [id number]                            ROOM:  ACCOUNT NO:   [de-identified]                           ADMIT DATE: 2020  PROVIDER:     Aristeo Garrison MD    DATE OF PROCEDURE:  2020    LEFT HEART CATHETERIZATION    INDICATION:  New onset CHF and newly diagnosed cardiomyopathy with severe  systolic dysfunction. PROCEDURE NOTE:  The patient was transferred from [facility]  today to undergo left heart catheterization due to newly diagnosed CHF  and cardiomyopathy with severe systolic dysfunction. The patient was  brought to the cardiac cath lab in his usual fasting state. Under  sterile condition and under local anesthetic and using conscious  sedation, a 6-Bermudian Terumo slender sheath was inserted into the right  radial artery. The patient received 5000 units of intravenous heparin. He also received 300 mcg of intraarterial nitroglycerin via the right  radial sheath. Then I could not advance a J-tip wire across the distal  radial artery. The patient received 2.5 mg of diluted verapamil and 200  mcg of intra-arterial nitroglycerin via the right radial sheath. Then,  a Wholey wire was advanced to the ascending aorta with little difficulty  due to angulated right subclavian artery. Then a 5-B[place]ud[place] TIG  diagnostic catheter was advanced to the ascending aorta and the left  main coronary artery was engaged and a coronary angiogram was done. This catheter was used to engage the right coronary artery and a  coronary angiogram was done. This catheter was exchanged over the  guidewire to a 5-Bermudian pigtail which was advanced into the left  ventricle without difficulty. The left ventricular end-diastolic  pressure was measured.   No left ventriculogram was done due to elevated  creatinine and due to known ejection fraction by echocardiogram.  At the  end of the procedure, the pigtail was pulled out. The Terumo slender  sheath was pulled out and a Vasc Band was applied over the right radial  artery with good hemostasis. The patient tolerated the procedure very  well and no complications were noted. No significant blood loss  occurred during the procedure. FINDINGS:  HEMODYNAMIC RESULTS:  1. Aortic pressure 134/78 mmHg. 2.  Left ventricular end-diastolic pressure 23 mmHg. CORONARY ANATOMY:  Left main:  It is a long and large artery with no angiographic stenosis  noted. LAD:  It is a large artery wrapping around the apex and giving rise to  two diagonal branches and a large first septal  and smaller  septal branches. The first diagonal was small. The second diagonal was  large and bifurcating. No angiographic stenosis noted in the LAD or its  branches. Left circumflex: It is a large artery giving rise to three obtuse  marginal branches. The first and second obtuse marginal branches were  very small. The third obtuse marginal branch was bifurcating and  tortuous. No angiographic stenosis noted. RCA:  It is a large dominant artery giving rise to a conus branch, SA  woody or AV woody branch, an RV marginal branch, a large PDA and a large  PLV branch. There was questionable 50% discrete distal RCA stenosis  versus most likely overlap of the proximal segment of the PDA and of  the PLV branch. IMPRESSION:  1. Questionable 50% discrete distal RCA stenosis versus most likely  overlap of the proximal segment of PLV and PDA. 2.  Absence of angiographic stenosis in the left main, LAD or left  circumflex. 3.  Elevated LVEDP at 23 mmHg. RECOMMENDATIONS:  1. GDMT. 2.  Aggressive medical therapy. PROCEDURE START TIME:  17:22 p.m. PROCEDURE END TIME:  17:43 p.m. CONTRAST VOLUME:  41 mL of Optiray.     FLUOROSCOPY TIME:  3.1 minutes. CONSCIOUS SEDATION:  2 mg of Versed and 100 mcg of intravenous fentanyl.         Jules Thompson MD    D: 08/18/2020 18:12:21       T: 08/18/2020 18:17:41     [email or web address]  Job#: [id number]     Doc#: [id number]    CC:

## 2020-08-18 NOTE — PROGRESS NOTES
Addendum: I have personally participated in the history, exam, medical decision making with Sean Darling NP on the date of service, I have personally reviewed imaging and lab data as well as EKG and I agree with all of the pertinent clinical information unless otherwise noted. I have also reviewed and agree with the past medical, family, and social history unless otherwise noted. Please link this note to the NP/PA note of the same date 8/18/2020  Patient reported feeling well    PHYSICAL EXAM:  Vitals:  /83   Pulse 92   Temp 97.4 °F (36.3 °C) (Oral)   Resp 18   Ht 6' (1.829 m)   Wt 233 lb 4.8 oz (105.8 kg)   SpO2 96%   BMI 31.64 kg/m²   Lungs are with some crackles bilaterally no wheezing. Heart S1-S2. Abdomen soft nontender nondistended positive bowel sounds. Extremities +2 peripheral edema. Impression:  Principal Problem:    New onset atrial flutter (HCC)  Active Problems:    Essential hypertension    Acute decompensated heart failure ([address]    New onset of congestive heart failure (HCC)    Atrial flutter with rapid ventricular response (HCC)    Acidosis    Elevated LFTs  Resolved Problems:    * No resolved hospital problems. *      My findings/plan include:  1.  A. fib with RVR, rate is controlled now, Toprol-XL increased to 50 mg twice daily, weaned off Cardizem drip, chads-vas score of 2, needs anticoagulation, continue Lovenox subcutaneously for now as the patient is scheduled for a cardiac cath today. Plans for VICTORIA DC cardioversion after cath with antiarrhythmic therapy  2. New onset cardiomyopathy, echo showed ejection fraction of 20 to 25%, need to rule out ischemic etiology, patient is for cardiac cath today, for now continue on diuretics. Fluid balance -2.3 L.  3.  Acute renal failure, most likely due to diuretics, creatinine is at 1.3, will continue to monitor      NOTE: This report was transcribed using voice recognition software.  Every effort was made to ensure accuracy; however, inadvertent computerized transcription errors may be present.   Electronically signed by Kenya Goetz MD on 8/18/2020 at 11:16 AM

## 2020-08-18 NOTE — PROGRESS NOTES
[facility]   Progress Note    Admitting Date and Time: 8/15/2020  7:04 PM  Admit Dx: Atrial flutter with rapid ventricular response ([address] [I48.92]  Atrial flutter with rapid ventricular response ([address] [I48.92]    Subjective:    Patient was admitted with Atrial flutter with rapid ventricular response ([address] [I48.92]  Atrial flutter with rapid ventricular response ([address] [I48.92]. Patient awake ans alert with no complaints of chest pain or shortness of breath. ROS: denies fever, chills, cp, sob, n/v, HA unless stated above.      furosemide  20 mg Intravenous BID    sodium chloride flush  10 mL Intravenous 2 times per day    enoxaparin  80 mg Subcutaneous BID    metoprolol succinate  50 mg Oral BID     sodium chloride flush, 10 mL, PRN  potassium chloride, 40 mEq, PRN    Or  potassium alternative oral replacement, 40 mEq, PRN    Or  potassium chloride, 10 mEq, PRN  magnesium sulfate, 1 g, PRN  acetaminophen, 650 mg, Q6H PRN    Or  acetaminophen, 650 mg, Q6H PRN  magnesium hydroxide, 30 mL, Daily PRN  promethazine, 12.5 mg, Q6H PRN    Or  ondansetron, 4 mg, Q6H PRN         Objective:    /83   Pulse 92   Temp 97.4 °F (36.3 °C) (Oral)   Resp 18   Ht 6' (1.829 m)   Wt 233 lb 4.8 oz (105.8 kg)   SpO2 96%   BMI 31.64 kg/m²   General Appearance: alert and oriented to person, place and time, well-developed and well-nourished, in no acute distress  Skin: warm and dry, no rash or erythema  Head: normocephalic and atraumatic  Eyes: pupils equal, round, and reactive to light and conjunctivae normal  ENT: hearing grossly normal bilaterally  Neck: neck supple and non tender without mass, no thyromegaly or thyroid nodules, no cervical lymphadenopathy   Pulmonary/Chest: decreased breath sounds noted- throughout   Cardiovascular: normal rate, regular rhythm and intact distal pulses  Abdomen: soft, non-tender, non-distended, normal bowel sounds, no masses or organomegaly  Extremities: no cyanosis, no clubbing and 1-2 + edema-  bilateral lower extremities right leg greater than left   Musculoskeletal: normal range of motion, no joint swelling, deformity or tenderness  Neurologic: no cranial nerve deficit, gait and coordination normal and speech normal      Recent Labs     08/16/20 0328 08/17/20 0316 08/18/20  0515    138 139   K 3.7 4.0 3.7    99 101   CO2 21* 26 25   BUN 15 18 18   CREATININE 1.2 1.4* 1.3*   GLUCOSE 105* 96 85   CALCIUM 9.2 9.1 8.9       Recent Labs     08/16/20 0328 08/17/20 0316 08/18/20  0515   WBC 4.5 4.3* 3.8*   RBC 3.88 3.90 4.06   HGB 13.5 13.6 14.2   HCT 39.9 40.4 41.8   .8* 103.6* 103.0*   MCH 34.8 34.9 35.0   MCHC 33.8 33.7 34.0   RDW 13.9 14.3 13.5    198 209   MPV 11.2 11.8 11.5         Radiology:   CTA PULMONARY W CONTRAST   Final Result      1. No pulmonary embolism. 2. Cardiomegaly with mild interstitial edema in the lungs indicating   CHF exacerbation. 3. Hepatic steatosis. US DUP LOWER EXTREMITIES BILATERAL VENOUS   Final Result   No evidence to suggest deep venous thrombosis of the bilateral lower   extremities. XR CHEST (2 VW)   Final Result   Mild pulmonary congestion without evidence of significant pulmonary   edema or pleural effusion. Assessment:    Principal Problem:    New onset atrial flutter (HCC)  Active Problems:    Essential hypertension    Acute decompensated heart failure ([address]    New onset of congestive heart failure (HCC)    Atrial flutter with rapid ventricular response (HCC)    Acidosis    Elevated LFTs  Resolved Problems:    * No resolved hospital problems. *      Plan:    1.  New onset atrial flutter/atrial fibrillation - [id number] Vasc score 2 - no chest pain - remains in flutter with rapid rate - cardiology following - echocardiogram demonstrated EF 20-25% - heart cath planned for today - NPO - continue Toprol XL but discontinued Cardizem drip and start oral

## 2020-08-18 NOTE — PLAN OF CARE
Problem: Cardiac:  Goal: Ability to maintain an adequate cardiac output will improve  Description: Ability to maintain an adequate cardiac output will improve  Outcome: Met This Shift  Goal: Hemodynamic stability will improve  Description: Hemodynamic stability will improve  8/18/2020 0332 by Vinay Harrell RN  Outcome: Met This Shift  8/17/2020 1632 by Shalini Mcmahon RN  Outcome: Met This Shift     Problem: Fluid Volume:  Goal: Ability to achieve and maintain adequate urine output will improve  Description: Ability to achieve and maintain adequate urine output will improve  Outcome: Met This Shift

## 2020-08-19 ENCOUNTER — APPOINTMENT (OUTPATIENT)
Dept: NON INVASIVE DIAGNOSTICS | Age: 60
DRG: 308 | End: 2020-08-19
Attending: INTERNAL MEDICINE
Payer: OTHER GOVERNMENT

## 2020-08-19 ENCOUNTER — ANESTHESIA (OUTPATIENT)
Dept: NON INVASIVE DIAGNOSTICS | Age: 60
DRG: 308 | End: 2020-08-19
Payer: OTHER GOVERNMENT

## 2020-08-19 ENCOUNTER — ANESTHESIA EVENT (OUTPATIENT)
Dept: NON INVASIVE DIAGNOSTICS | Age: 60
DRG: 308 | End: 2020-08-19
Payer: OTHER GOVERNMENT

## 2020-08-19 ENCOUNTER — HOSPITAL ENCOUNTER (INPATIENT)
Age: 60
LOS: 1 days | Discharge: HOME | DRG: 308 | End: 2020-08-20
Attending: INTERNAL MEDICINE | Admitting: INTERNAL MEDICINE
Payer: OTHER GOVERNMENT

## 2020-08-19 VITALS
RESPIRATION RATE: 26 BRPM | OXYGEN SATURATION: 96 % | DIASTOLIC BLOOD PRESSURE: 66 MMHG | SYSTOLIC BLOOD PRESSURE: 108 MMHG

## 2020-08-19 PROBLEM — I50.31 ACUTE DIASTOLIC CHF (CONGESTIVE HEART FAILURE) (HCC): Status: ACTIVE | Noted: 2020-08-19

## 2020-08-19 LAB
ALBUMIN SERPL-MCNC: 3.5 G/DL (ref 3.5–5.2)
ALP BLD-CCNC: 42 U/L (ref 40–129)
ALT SERPL-CCNC: 121 U/L (ref 0–40)
ANION GAP SERPL CALCULATED.3IONS-SCNC: 12 MMOL/L (ref 7–16)
AST SERPL-CCNC: 45 U/L (ref 0–39)
BILIRUB SERPL-MCNC: 1 MG/DL (ref 0–1.2)
BILIRUBIN DIRECT: 0.3 MG/DL (ref 0–0.3)
BILIRUBIN, INDIRECT: 0.7 MG/DL (ref 0–1)
BUN BLDV-MCNC: 16 MG/DL (ref 6–20)
CALCIUM SERPL-MCNC: 8.7 MG/DL (ref 8.6–10.2)
CHLORIDE BLD-SCNC: 99 MMOL/L (ref 98–107)
CO2: 28 MMOL/L (ref 22–29)
CREAT SERPL-MCNC: 1.3 MG/DL (ref 0.7–1.2)
EKG ATRIAL RATE: 267 BPM
EKG P AXIS: -99 DEGREES
EKG Q-T INTERVAL: 366 MS
EKG QRS DURATION: 102 MS
EKG QTC CALCULATION (BAZETT): 477 MS
EKG R AXIS: -10 DEGREES
EKG T AXIS: -56 DEGREES
EKG VENTRICULAR RATE: 102 BPM
GFR AFRICAN AMERICAN: >60
GFR NON-AFRICAN AMERICAN: >60 ML/MIN/1.73
GLUCOSE BLD-MCNC: 91 MG/DL (ref 74–99)
LV EF: 23 %
LVEF MODALITY: NORMAL
POTASSIUM REFLEX MAGNESIUM: 4.4 MMOL/L (ref 3.5–5)
SODIUM BLD-SCNC: 139 MMOL/L (ref 132–146)
TOTAL PROTEIN: 6 G/DL (ref 6.4–8.3)

## 2020-08-19 PROCEDURE — 7100000010 HC PHASE II RECOVERY - FIRST 15 MIN

## 2020-08-19 PROCEDURE — 99232 SBSQ HOSP IP/OBS MODERATE 35: CPT | Performed by: INTERNAL MEDICINE

## 2020-08-19 PROCEDURE — 2580000003 HC RX 258: Performed by: INTERNAL MEDICINE

## 2020-08-19 PROCEDURE — 7100000011 HC PHASE II RECOVERY - ADDTL 15 MIN

## 2020-08-19 PROCEDURE — 92960 CARDIOVERSION ELECTRIC EXT: CPT

## 2020-08-19 PROCEDURE — 93312 ECHO TRANSESOPHAGEAL: CPT

## 2020-08-19 PROCEDURE — 3700000001 HC ADD 15 MINUTES (ANESTHESIA)

## 2020-08-19 PROCEDURE — 2580000003 HC RX 258: Performed by: NURSE ANESTHETIST, CERTIFIED REGISTERED

## 2020-08-19 PROCEDURE — 93005 ELECTROCARDIOGRAM TRACING: CPT | Performed by: INTERNAL MEDICINE

## 2020-08-19 PROCEDURE — 93325 DOPPLER ECHO COLOR FLOW MAPG: CPT

## 2020-08-19 PROCEDURE — 93010 ELECTROCARDIOGRAM REPORT: CPT | Performed by: INTERNAL MEDICINE

## 2020-08-19 PROCEDURE — 2500000003 HC RX 250 WO HCPCS

## 2020-08-19 PROCEDURE — 3700000000 HC ANESTHESIA ATTENDED CARE

## 2020-08-19 PROCEDURE — 80076 HEPATIC FUNCTION PANEL: CPT

## 2020-08-19 PROCEDURE — APPSS30 APP SPLIT SHARED TIME 16-30 MINUTES: Performed by: PHYSICIAN ASSISTANT

## 2020-08-19 PROCEDURE — B24BZZ4 ULTRASONOGRAPHY OF HEART WITH AORTA, TRANSESOPHAGEAL: ICD-10-PCS | Performed by: INTERNAL MEDICINE

## 2020-08-19 PROCEDURE — 36415 COLL VENOUS BLD VENIPUNCTURE: CPT

## 2020-08-19 PROCEDURE — 6370000000 HC RX 637 (ALT 250 FOR IP): Performed by: INTERNAL MEDICINE

## 2020-08-19 PROCEDURE — 99233 SBSQ HOSP IP/OBS HIGH 50: CPT | Performed by: INTERNAL MEDICINE

## 2020-08-19 PROCEDURE — 92960 CARDIOVERSION ELECTRIC EXT: CPT | Performed by: INTERNAL MEDICINE

## 2020-08-19 PROCEDURE — 93320 DOPPLER ECHO COMPLETE: CPT

## 2020-08-19 PROCEDURE — 80048 BASIC METABOLIC PNL TOTAL CA: CPT

## 2020-08-19 PROCEDURE — 5A2204Z RESTORATION OF CARDIAC RHYTHM, SINGLE: ICD-10-PCS | Performed by: INTERNAL MEDICINE

## 2020-08-19 PROCEDURE — 6360000002 HC RX W HCPCS: Performed by: NURSE ANESTHETIST, CERTIFIED REGISTERED

## 2020-08-19 PROCEDURE — 2060000000 HC ICU INTERMEDIATE R&B

## 2020-08-19 PROCEDURE — 2500000003 HC RX 250 WO HCPCS: Performed by: NURSE ANESTHETIST, CERTIFIED REGISTERED

## 2020-08-19 PROCEDURE — 6360000002 HC RX W HCPCS

## 2020-08-19 PROCEDURE — 6360000002 HC RX W HCPCS: Performed by: INTERNAL MEDICINE

## 2020-08-19 RX ORDER — AMIODARONE HYDROCHLORIDE 200 MG/1
200 TABLET ORAL DAILY
Status: DISCONTINUED | OUTPATIENT
Start: 2020-08-30 | End: 2020-08-20 | Stop reason: HOSPADM

## 2020-08-19 RX ORDER — POTASSIUM CHLORIDE 20 MEQ/1
40 TABLET, EXTENDED RELEASE ORAL PRN
Status: DISCONTINUED | OUTPATIENT
Start: 2020-08-19 | End: 2020-08-20 | Stop reason: HOSPADM

## 2020-08-19 RX ORDER — ONDANSETRON 2 MG/ML
4 INJECTION INTRAMUSCULAR; INTRAVENOUS EVERY 6 HOURS PRN
Status: DISCONTINUED | OUTPATIENT
Start: 2020-08-19 | End: 2020-08-20 | Stop reason: HOSPADM

## 2020-08-19 RX ORDER — MAGNESIUM SULFATE 1 G/100ML
1 INJECTION INTRAVENOUS PRN
Status: DISCONTINUED | OUTPATIENT
Start: 2020-08-19 | End: 2020-08-20 | Stop reason: HOSPADM

## 2020-08-19 RX ORDER — FUROSEMIDE 10 MG/ML
20 INJECTION INTRAMUSCULAR; INTRAVENOUS 2 TIMES DAILY
Status: DISCONTINUED | OUTPATIENT
Start: 2020-08-19 | End: 2020-08-19

## 2020-08-19 RX ORDER — METOPROLOL SUCCINATE 50 MG/1
50 TABLET, EXTENDED RELEASE ORAL DAILY
Qty: 30 TABLET | Refills: 0 | Status: CANCELLED | OUTPATIENT
Start: 2020-08-20

## 2020-08-19 RX ORDER — ISOSORBIDE DINITRATE 10 MG/1
20 TABLET ORAL 3 TIMES DAILY
Status: DISCONTINUED | OUTPATIENT
Start: 2020-08-19 | End: 2020-08-20 | Stop reason: HOSPADM

## 2020-08-19 RX ORDER — DIGOXIN 125 MCG
125 TABLET ORAL DAILY
Status: DISCONTINUED | OUTPATIENT
Start: 2020-08-19 | End: 2020-08-20

## 2020-08-19 RX ORDER — ACETAMINOPHEN 325 MG/1
650 TABLET ORAL EVERY 6 HOURS PRN
Status: DISCONTINUED | OUTPATIENT
Start: 2020-08-19 | End: 2020-08-20 | Stop reason: HOSPADM

## 2020-08-19 RX ORDER — SODIUM CHLORIDE 0.9 % (FLUSH) 0.9 %
10 SYRINGE (ML) INJECTION PRN
Status: DISCONTINUED | OUTPATIENT
Start: 2020-08-19 | End: 2020-08-20 | Stop reason: HOSPADM

## 2020-08-19 RX ORDER — METOPROLOL SUCCINATE 50 MG/1
50 TABLET, EXTENDED RELEASE ORAL DAILY
Status: DISCONTINUED | OUTPATIENT
Start: 2020-08-19 | End: 2020-08-20

## 2020-08-19 RX ORDER — SODIUM CHLORIDE 9 MG/ML
INJECTION, SOLUTION INTRAVENOUS CONTINUOUS PRN
Status: DISCONTINUED | OUTPATIENT
Start: 2020-08-19 | End: 2020-08-19 | Stop reason: SDUPTHER

## 2020-08-19 RX ORDER — DIGOXIN 125 MCG
125 TABLET ORAL DAILY
Qty: 30 TABLET | Refills: 0 | Status: CANCELLED | OUTPATIENT
Start: 2020-08-20

## 2020-08-19 RX ORDER — HYDRALAZINE HYDROCHLORIDE 25 MG/1
25 TABLET, FILM COATED ORAL EVERY 8 HOURS SCHEDULED
Status: DISCONTINUED | OUTPATIENT
Start: 2020-08-19 | End: 2020-08-20 | Stop reason: HOSPADM

## 2020-08-19 RX ORDER — PROMETHAZINE HYDROCHLORIDE 25 MG/1
12.5 TABLET ORAL EVERY 6 HOURS PRN
Status: DISCONTINUED | OUTPATIENT
Start: 2020-08-19 | End: 2020-08-20 | Stop reason: HOSPADM

## 2020-08-19 RX ORDER — PROPOFOL 10 MG/ML
INJECTION, EMULSION INTRAVENOUS PRN
Status: DISCONTINUED | OUTPATIENT
Start: 2020-08-19 | End: 2020-08-19 | Stop reason: SDUPTHER

## 2020-08-19 RX ORDER — AMIODARONE HYDROCHLORIDE 200 MG/1
400 TABLET ORAL 2 TIMES DAILY
Status: DISCONTINUED | OUTPATIENT
Start: 2020-08-19 | End: 2020-08-20 | Stop reason: HOSPADM

## 2020-08-19 RX ORDER — ACETAMINOPHEN 650 MG/1
650 SUPPOSITORY RECTAL EVERY 6 HOURS PRN
Status: DISCONTINUED | OUTPATIENT
Start: 2020-08-19 | End: 2020-08-20 | Stop reason: HOSPADM

## 2020-08-19 RX ORDER — SODIUM CHLORIDE 0.9 % (FLUSH) 0.9 %
10 SYRINGE (ML) INJECTION EVERY 12 HOURS SCHEDULED
Status: DISCONTINUED | OUTPATIENT
Start: 2020-08-19 | End: 2020-08-20 | Stop reason: HOSPADM

## 2020-08-19 RX ORDER — LIDOCAINE HYDROCHLORIDE 20 MG/ML
INJECTION, SOLUTION INFILTRATION; PERINEURAL PRN
Status: DISCONTINUED | OUTPATIENT
Start: 2020-08-19 | End: 2020-08-19 | Stop reason: SDUPTHER

## 2020-08-19 RX ORDER — POTASSIUM CHLORIDE 7.45 MG/ML
10 INJECTION INTRAVENOUS PRN
Status: DISCONTINUED | OUTPATIENT
Start: 2020-08-19 | End: 2020-08-20 | Stop reason: HOSPADM

## 2020-08-19 RX ORDER — FUROSEMIDE 40 MG/1
40 TABLET ORAL DAILY
Status: DISCONTINUED | OUTPATIENT
Start: 2020-08-19 | End: 2020-08-20 | Stop reason: HOSPADM

## 2020-08-19 RX ADMIN — AMIODARONE HYDROCHLORIDE 400 MG: 200 TABLET ORAL at 21:10

## 2020-08-19 RX ADMIN — HYDRALAZINE HYDROCHLORIDE 25 MG: 25 TABLET, FILM COATED ORAL at 14:48

## 2020-08-19 RX ADMIN — ISOSORBIDE DINITRATE 20 MG: 10 TABLET ORAL at 21:10

## 2020-08-19 RX ADMIN — APIXABAN 5 MG: 5 TABLET, FILM COATED ORAL at 21:10

## 2020-08-19 RX ADMIN — ISOSORBIDE DINITRATE 20 MG: 10 TABLET ORAL at 09:16

## 2020-08-19 RX ADMIN — APIXABAN 5 MG: 5 TABLET, FILM COATED ORAL at 09:16

## 2020-08-19 RX ADMIN — LIDOCAINE HYDROCHLORIDE 25 MG: 20 INJECTION, SOLUTION INFILTRATION; PERINEURAL at 13:21

## 2020-08-19 RX ADMIN — SODIUM CHLORIDE, PRESERVATIVE FREE 10 ML: 5 INJECTION INTRAVENOUS at 21:11

## 2020-08-19 RX ADMIN — ISOSORBIDE DINITRATE 20 MG: 10 TABLET ORAL at 14:48

## 2020-08-19 RX ADMIN — DIGOXIN 125 MCG: 125 TABLET ORAL at 09:17

## 2020-08-19 RX ADMIN — HYDRALAZINE HYDROCHLORIDE 25 MG: 25 TABLET, FILM COATED ORAL at 21:10

## 2020-08-19 RX ADMIN — SODIUM CHLORIDE: 9 INJECTION, SOLUTION INTRAVENOUS at 13:05

## 2020-08-19 RX ADMIN — PROPOFOL 320 MG: 10 INJECTION, EMULSION INTRAVENOUS at 13:08

## 2020-08-19 RX ADMIN — METOPROLOL SUCCINATE 50 MG: 50 TABLET, EXTENDED RELEASE ORAL at 09:17

## 2020-08-19 RX ADMIN — FUROSEMIDE 20 MG: 10 INJECTION, SOLUTION INTRAVENOUS at 09:17

## 2020-08-19 RX ADMIN — SODIUM CHLORIDE, PRESERVATIVE FREE 10 ML: 5 INJECTION INTRAVENOUS at 09:17

## 2020-08-19 RX ADMIN — LIDOCAINE HYDROCHLORIDE 25 MG: 20 INJECTION, SOLUTION INFILTRATION; PERINEURAL at 13:08

## 2020-08-19 RX ADMIN — HYDRALAZINE HYDROCHLORIDE 25 MG: 25 TABLET, FILM COATED ORAL at 04:54

## 2020-08-19 RX ADMIN — FUROSEMIDE 40 MG: 40 TABLET ORAL at 18:39

## 2020-08-19 ASSESSMENT — PAIN SCALES - GENERAL
PAINLEVEL_OUTOF10: 0

## 2020-08-19 NOTE — PROGRESS NOTES
Called [facility] and arranged return to [facility] post cath lab. Vasc Band weaned without difficulty and restrictions explained to the patient. ETA within the hour.

## 2020-08-19 NOTE — CARE COORDINATION
New prescription for po Tikosyn noted. Per SW, medication requires prior [place] as per retail pharmacy [facility]). Prior auth request initiated on-line via [employer] with Carmen Prado  Case ID [id number]    Will await response/determination which may take up to 24 hrs    Caesar Valentin.  Griffin, MSN, RN  [facility] Case Management  [phone]

## 2020-08-19 NOTE — PROGRESS NOTES
Perfect serve sent to Dr. Abraham Villanueva notifying of pt's return to [facility].

## 2020-08-19 NOTE — CARE COORDINATION
Social Work/Discharge Planning:  Received notification to confirm cost of Tikosyn. Called Jr Forbes at [address] (ph: [phone], fax: [phone]) and he states they will need a script to check cost of Tikosyn. Notified charge nurse. Will continue to follow. Electronically signed by LYNETTE Ham on 8/19/2020 at 12:08 PM    Addendum:  [employer]aco [employer] script to [facility] for pharmacy to check cost.  Will continue to follow. Electronically signed by LYNETTE Ham on 8/19/2020 at 12:45 PM    Addendum:  Received return call from Juliane at [address] and she states patient needs prior authorization for Tikosyn. [facility] provided [facility] (ph: [phone]) number with insurance information (ID#: [id number], Group#: [id number]) to submit for prior authorization. Notified . Will continue to follow.   Electronically signed by LYNETTE Ham on 8/19/2020 at 1:36 PM

## 2020-08-19 NOTE — PLAN OF CARE
Patient was transferred earlier yesterday to have cardiac cath and based on results there will decide whether he needs to be admitted there or not, left heart cath was done and not impressive for significant CAD and no stent has been placed, advised GDMT. I was contacted to put transfer order from [facility] to [facility], patient arrived early this morning, and his vitals are fine he is still in A. Flutter, but his heart rate in 60s. He was transferred on Cardizem drip 5 mg/h.   This has been stopped    Resuming Toprol-XL 50 mg daily  Isosorbide dinitrate 20 3 times daily  Hydralazine 25 3 times daily  Eliquis 5 twice daily  Digoxin 125 daily  Lasix IV 20 twice daily    Keep patient n.p.o., most likely plan for cardioversion  Reconsulted cardiology to continue evaluating the patient

## 2020-08-19 NOTE — PATIENT CARE CONFERENCE
[facility] Quality Flow/Interdisciplinary Rounds Progress Note        Quality Flow Rounds held on August 19, 2020    Disciplines Attending:  Bedside Nurse,  and Nursing Unit Leadership    [facility] was admitted on 8/19/2020 12:13 AM    Anticipated Discharge Date:  Expected Discharge Date: 08/20/20    Disposition:    Ryan Score:  Ryan Scale Score: 22    Readmission Score:         Discussed patient goal for the day, patient clinical progression, and barriers to discharge.   The following Goal(s) of the Day/Commitment(s) have been identified:  Plan for diana DCCV today, continue telemetry       Marlise Adjutant  August 19, 2020

## 2020-08-19 NOTE — PROGRESS NOTES
[facility]   Progress Note    Admitting Date and Time: 8/19/2020 12:13 AM  Admit Dx: atrial flutter with rapid ventricular response    Subjective:    Patient was admitted with atrial flutter with rapid ventricular response. Patient feels well today. He states that he is to have cardioversion later today. Patient states otherwise he has no complaints. Per RN: No acute events overnight    ROS: denies fever, chills, cp, sob, n/v, HA unless stated above.      sodium chloride flush  10 mL Intravenous 2 times per day    apixaban  5 mg Oral BID    metoprolol succinate  50 mg Oral Daily    isosorbide dinitrate  20 mg Oral TID    hydrALAZINE  25 mg Oral 3 times per day    digoxin  125 mcg Oral Daily    furosemide  20 mg Intravenous BID     sodium chloride flush, 10 mL, PRN  potassium chloride, 40 mEq, PRN    Or  potassium alternative oral replacement, 40 mEq, PRN    Or  potassium chloride, 10 mEq, PRN  magnesium sulfate, 1 g, PRN  acetaminophen, 650 mg, Q6H PRN    Or  acetaminophen, 650 mg, Q6H PRN  magnesium hydroxide, 30 mL, Daily PRN  promethazine, 12.5 mg, Q6H PRN    Or  ondansetron, 4 mg, Q6H PRN         Objective:    BP (!) 122/98   Pulse 68   Temp 98.3 °F (36.8 °C) (Oral)   Resp 16   Ht 6' (1.829 m)   Wt 229 lb 12.8 oz (104.2 kg)   SpO2 96%   BMI 31.17 kg/m²   General Appearance: in no acute distress and alert  Skin: warm and dry, no rash or erythema  Pulmonary/Chest: clear to auscultation bilaterally- no wheezes, rales or rhonchi, normal air movement, no respiratory distress  Cardiovascular: normal rate, irregular rhythm, normal S1 and S2, no murmurs, no gallops, intact distal pulses and no JVD  Extremities: no cyanosis, no clubbing and no edema      Recent Labs     08/17/20  0316 08/18/20  0515 08/19/20  0459    139 139   K 4.0 3.7 4.4   CL 99 101 99   CO2 26 25 28   BUN 18 18 16   CREATININE 1.4* 1.3* 1.3*   GLUCOSE 96 85 91   CALCIUM 9.1 8.9 8.7       Recent Labs 08/17/20  0316 08/18/20  0515   WBC 4.3* 3.8*   RBC 3.90 4.06   HGB 13.6 14.2   HCT 40.4 41.8   .6* 103.0*   MCH 34.9 35.0   MCHC 33.7 34.0   RDW 14.3 13.5    209   MPV 11.8 11.5       Radiology:   No orders to display       Assessment:    Active Problems:    Essential hypertension    New onset atrial flutter (HCC)    Acute decompensated heart failure ([address]    New onset of congestive heart failure (HCC)  Resolved Problems:    * No resolved hospital problems. *      Plan:    1. Atrial fibrillation  - Cardiology following, currently rate well controlled. Initially on Cardizem drip this has been weaned off. Plan for DCCV later today. - On Eliquis for anticoagulation    2. Acute HFrEF  -EF 20 to 25%, underwent cardiac catheterization 8-18-20 which revealed no significant CAD   -Currently on Lasix IV 20 mg twice daily for diuresis. 3. ANTHONY  - Cr 1.3, baseline seems to be 1.2. Continue to monitor. 4. Hypertension  -Stable, continue current regimen. 5. Transaminitis  - Trending downwards, continue to monitor     6. Tobacco abuse  -Cessation discussed.        Electronically signed by Dane Shah PA-C on 8/19/2020 at 10:27 AM

## 2020-08-19 NOTE — PROGRESS NOTES
INPATIENT CARDIOLOGY FOLLOW-UP    Name: Dalton Lockhart    Age: 64 y.o. Date of Admission: 8/19/2020 12:13 AM    Date of Service: 8/19/2020    Chief Complaint: Follow-up for Atrial fibrillation with RVR and CHF    Interim History:  No new overnight cardiac complaints and feeling much better. Currently with no complaints of CP, SOB, palpitations, dizziness, or lightheadedness. Aflutter on telemetry. Review of Systems:   Cardiac: As per HPI  General: No fever, chills  Pulmonary: As per HPI  HEENT: No visual disturbances, difficult swallowing  GI: No nausea, vomiting  Endocrine: No thyroid disease or DM  Musculoskeletal: HERRMANN x 4, no focal motor deficits  Skin: Intact, no rashes  Neuro/Psych: No headache or seizures    Problem List:  Patient Active Problem List   Diagnosis    Primary osteoarthritis of left hip    Essential hypertension    Acute blood loss as cause of postoperative anemia    Osteoarthritis of multiple joints    New onset atrial flutter (HCC)    Acute decompensated heart failure ([address]    New onset of congestive heart failure (HCC)    Atrial flutter with rapid ventricular response (HCC)    Acidosis    Elevated LFTs    CAD in native artery       Allergies:   Allergies   Allergen Reactions    Ace Inhibitors Swelling    Lisinopril Swelling     Angioedema       Current Medications:  Current Facility-Administered Medications   Medication Dose Route Frequency Provider Last Rate Last Dose    sodium chloride flush 0.9 % injection 10 mL  10 mL Intravenous 2 times per day Annamarie Og MD        sodium chloride flush 0.9 % injection 10 mL  10 mL Intravenous PRN Annamarie Og MD        potassium chloride (KLOR-CON M) extended release tablet 40 mEq  40 mEq Oral PRN Annamarie Og MD        Or    potassium bicarb-citric acid (EFFER-K) effervescent tablet 40 mEq  40 mEq Oral PRN Annamarie Og MD        Or    potassium chloride 10 mEq/100 mL IVPB (Peripheral Line)  10 mEq Intravenous PRN Annamarie Og MD focal motor deficits apparent, normal mood and affect  Peripheral Pulses: Intact posterior tibial pulses bilaterally    Intake/Output:  No intake or output data in the 24 hours ending 08/19/20 0816  No intake/output data recorded.     Laboratory Tests:  Recent Labs     08/17/20 0316 08/18/20 0515 08/19/20 0459    139 139   K 4.0 3.7 4.4   CL 99 101 99   CO2 26 25 28   BUN 18 18 16   CREATININE 1.4* 1.3* 1.3*   GLUCOSE 96 85 91   CALCIUM 9.1 8.9 8.7     Lab Results   Component Value Date    MG 2.0 08/17/2020     Recent Labs     08/17/20 0316 08/18/20 0515 08/19/20 0459   ALKPHOS 44 43 42   * 158* 121*   AST 91* 74* 45*   PROT 6.3* 6.0* 6.0*   BILITOT 1.1 1.1 1.0   BILIDIR 0.4* 0.3 0.3   LABALBU 3.9 3.5 3.5     Recent Labs     08/17/20 0316 08/18/20  0515   WBC 4.3* 3.8*   RBC 3.90 4.06   HGB 13.6 14.2   HCT 40.4 41.8   .6* 103.0*   MCH 34.9 35.0   MCHC 33.7 34.0   RDW 14.3 13.5    209   MPV 11.8 11.5     Lab Results   Component Value Date    TROPONINI 0.01 08/16/2020    TROPONINI <0.01 08/15/2020    TROPONINI <0.01 07/27/2016     Lab Results   Component Value Date    INR 1.1 08/15/2020    PROTIME 12.8 (H) 08/15/2020     Lab Results   Component Value Date    TSH 3.300 08/15/2020     Lab Results   Component Value Date    LABA1C 4.6 08/16/2020     No results found for: EAG  No results found for: CHOL  No results found for: TRIG  Lab Results   Component Value Date    HDL 57 08/16/2020     Lab Results   Component Value Date    LDLCALC 83 08/16/2020     Lab Results   Component Value Date    LABVLDL 15 08/16/2020     No results found for: CHOLHDLRATIO    Cardiac Tests:  Telemetry findings reviewed: A. flutter with CVR with heart rate in the 90s no new tachy/bradyarrhythmias overnight     EKG: Atrial flutter with a 2-1 block, ST-T changes suggestive inferior wall ischemia.     Labs were reviewed: Blood pressure 100/58 with heart rate of 88>>>113/84 >>137/74.     Labs were reviewed: and ultimately he needs to see them. VICTORIA guided DCCV discussed with the patient and risks and benefits discussed with and has agreed to proceed   ·  Hydralazine and Isordil were added yesterday by Dr. Jason Gilman and so far he is tolerating well. · Continue cardiac diet and restrict daily salt intake to less than 2 g  · No aldactone at this time, Isordil and hydralazine were added yesterday, if he remains hemodynamically stable then we can add Aldactone in one week. · Will plan for cardiac MRI as an out patient to evaluate the etiology of cardiomyopathy. Ricky Brantley MD., Vern Ruelas. [facility]) Cardiology      Addendum: He was successfully cardioverted. He was advised to start an antiarrhythmic therapy preferably Tikosyn but his insurance coverage for this medication is unknown. We will start him on amiodarone 400 twice daily x10 days and then 200 daily for a month. This is only for short-term use to maintain sinus rhythm. If he remains in sinus then we will switch over to Tikosyn at a later date with the EP referral.  He will follow-up with me in the office next week. Otherwise, he is stable from the cardiology standpoint for discharge.

## 2020-08-19 NOTE — PROGRESS NOTES
I provided Angle Avina with HF zones and education on Lasix, dig, isodril , & Toprol XL. Weeviewed signs and symptoms to report on day 1 of onset, medication compliance, daily weights, low sodium diet (label reading). He said, \"its common sense I need to watch my diet better\"  I advised patient you can reduce the risk for heart failure exacerbations by modifying/controlling the risk factors they have. We discussed self-managed care which includes the following: to take medications as prescribed- to call the doctor with any side effects do not just stop taking the medication, follow a cardiac heart healthy / low sodium diet, do daily weights, exercise regularly- per doctor recommendation and not to smoke. I stressed the importance of informing the cardiologist the first day of onset of signs and symptoms in the \"Yellow Zone\" of the HF Zones. Verbalizes understanding     Echocardiogram / EF: 8/16/2020   Summary   Left ventricle is moderately enlarged . Ejection fraction is visually estimated at 20-25%. Atrial fibrillation may   affect the evaluation of LV systolic function. Overall ejection fraction severely decreased . Abnormal LV diastolic function with elevated filling pressures (E/e' >11). The left atrium is severely dilated. Mildly dilated right ventricle. Right ventricle global systolic function is low normal . TAPSE 17 mm. Moderate to severe mitral regurgitation with centrally directed jet. No hemodynamically significant aortic stenosis is present. Moderate tricuspid regurgitation. RVSP is 36 mmHg. Normal estimated PA systolic pressure. No evidence for hemodynamically significant pericardial effusion. No previous echo for comparison.     No results found for: BNP   Lab Results   Component Value Date    PROBNP 3,313 (H) 08/15/2020        History of: allergies, HTN, arthritis      Medications:    sodium chloride flush  10 mL Intravenous 2 times per day    apixaban  5 mg Oral BID    metoprolol succinate  50 mg Oral Daily    isosorbide dinitrate  20 mg Oral TID    hydrALAZINE  25 mg Oral 3 times per day    digoxin  125 mcg Oral Daily    furosemide  20 mg Intravenous BID       Educated, and all questions answered    Code Status: Full Code     The patient is ordered:  Diet (sodium restriction): currently NPO  Sodium/fluid restriction daily ordered (fluid restriction recommended if patient is hyponatremic and/or diuretic is initiated or increased): No  FR: npo  Daily Weights:   Patient Vitals for the past 96 hrs (Last 3 readings):   Weight   08/19/20 0600 229 lb 12.8 oz (104.2 kg)     I/O: No intake or output data in the 24 hours ending 08/19/20 1054     I provided the patient with the following:     The Heart Failure Zones    Sodium-Free Flavoring Tips    Low-Sodium Nutrition Therapy    scale -Digital     Instructed to call 911 if you have any of the following symptoms: ?   Struggling to breathe unrelieved with rest,    Having chest pain, confusion or can't think clearly    Have confusion or cant think clearly  Discharge Plan: I placed HF Home Instructions in the discharge instructions. Readmission Risk Score: 11       Reminder: Discharge Instructions: activity, daily weights, diet, S/S, discharge medications & when to call the doctor.   Secure msg send to cardio for follow-up    Electronically signed by Britt Vargas RN on 8/19/2020 at 10:54 AM

## 2020-08-19 NOTE — CARE COORDINATION
Social Work/Discharge Planning:  Met with patient to confirm discharge plan. Patient had a heart cath yesterday at [place] [address].  He lives alone and plans to return home at discharge. He denies any home care needs at this time. Will continue to follow.   Electronically signed by LYNETTE Fragoso on 8/19/2020 at 9:52 AM

## 2020-08-19 NOTE — PROGRESS NOTES
Physician Progress Note      PATIENTRulynda RAMIREZ  CSN #:                  [id number]  :                       1960  ADMIT DATE:       2020 12:13 AM  DISCH DATE:  RESPONDING  PROVIDER #:        Vero Nava MD          QUERY TEXT:    Dear Attending Physician,    Pt admitted with new onset Afib/Aflutter and has Decompensated CHF per PCP and   Cardio documented. If possible, please document in progress notes and   discharge summary further specificity regarding the type and acuity of CHF:    The medical record reflects the following:  Risk Factors: new onset Afib Aflutter, HTN,non ischemic cardiomyopathy  Clinical Indicators: Per PCP ,\" ? Acute decompensated heart failure ? New   onset cardiomyopathy. D[place] Derril Carlos \" Per Cardio ,\". .Decompensated heart failure with   reduced EF   ? \"  Treatment: IV Lasix    Thank you,  Charli Drew RN CCDS  Clinical Documentation Improvement Specialist  [phone]  Options provided:  -- Acute on Chronic Systolic CHF/HFrEF  -- Acute on Chronic Diastolic CHF/HFpEF  -- Acute on Chronic Systolic and Diastolic CHF  -- Acute Systolic CHF/HFrEF  -- Acute Diastolic CHF/HFpEF  -- Acute Systolic and Diastolic CHF  -- Other - I will add my own diagnosis  -- Disagree - Not applicable / Not valid  -- Disagree - Clinically unable to determine / Unknown  -- Refer to Clinical Documentation Reviewer    PROVIDER RESPONSE TEXT:    This patient is in acute systolic CHF/HFrEF.     Query created by: Samantha Armas on 2020 9:31 AM      Electronically signed by:  Vero Nava MD 2020 12:26 PM

## 2020-08-19 NOTE — ANESTHESIA PRE PROCEDURE
Department of Anesthesiology  Preprocedure Note       Name:  Roe Weems   Age:  64 y.o.  :  1960                                          MRN:  [id number]         Date:  2020      Surgeon: * No surgeons listed *    Procedure: * No procedures listed *    Medications prior to admission:   Prior to Admission medications    Medication Sig Start Date End Date Taking?  Authorizing Provider   amLODIPine (NORVASC) 2.5 MG tablet Take 5 mg by mouth daily    Historical Provider, MD   naproxen (NAPROSYN) 500 MG tablet Take 1 tablet by mouth 2 times daily for 30 doses 10/16/17 10/31/17  Aamir Martinez PA-C   aspirin 325 MG EC tablet Take 1 tablet by mouth 2 times daily 9/16/16 10/16/17  Raquel Chowdary MD       Current medications:    Current Facility-Administered Medications   Medication Dose Route Frequency Provider Last Rate Last Dose    sodium chloride flush 0.9 % injection 10 mL  10 mL Intravenous 2 times per day Romario Morales MD   10 mL at 20 0917    sodium chloride flush 0.9 % injection 10 mL  10 mL Intravenous PRN Romario Morales MD        potassium chloride (KLOR-CON M) extended release tablet 40 mEq  40 mEq Oral PRN Romario Morales MD        Or    potassium bicarb-citric acid (EFFER-K) effervescent tablet 40 mEq  40 mEq Oral PRN Romario Morales MD        Or    potassium chloride 10 mEq/100 mL IVPB (Peripheral Line)  10 mEq Intravenous PRN Romario Morales MD        magnesium sulfate 1 g in dextrose 5% 100 mL IVPB  1 g Intravenous PRN Romario Morales MD        acetaminophen (TYLENOL) tablet 650 mg  650 mg Oral Q6H PRN Romario Morales MD        Or    acetaminophen (TYLENOL) suppository 650 mg  650 mg Rectal Q6H PRN Rahel Mcginnis MD        magnesium hydroxide (MILK OF MAGNESIA) 400 MG/5ML suspension 30 mL  30 mL Oral Daily PRN Rahel Mcginnis MD        promethazine (PHENERGAN) tablet 12.5 mg  12.5 mg Oral Q6H PRN Rahel Mcginnis MD        Or    ondansetron (ZOFRAN) injection 4 mg  4 mg Intravenous Q6H PRN Rahel Mcginnis MD        apixaban (ELIQUIS) tablet 5 mg  5 mg Oral BID Cholo Marion MD   5 mg at 08/19/20 0916    metoprolol succinate (TOPROL XL) extended release tablet 50 mg  50 mg Oral Daily Cholo Marion MD   50 mg at 08/19/20 0917    isosorbide dinitrate (ISORDIL) tablet 20 mg  20 mg Oral TID Cholo Marion MD   20 mg at 08/19/20 0916    hydrALAZINE (APRESOLINE) tablet 25 mg  25 mg Oral 3 times per day Cholo Marion MD   25 mg at 08/19/20 0454    digoxin (LANOXIN) tablet 125 mcg  125 mcg Oral Daily Cholo Marion MD   125 mcg at 08/19/20 0917    furosemide (LASIX) injection 20 mg  20 mg Intravenous BID Cholo Marion MD   20 mg at 08/19/20 0917       Allergies: Allergies   Allergen Reactions    Ace Inhibitors Swelling    Lisinopril Swelling     Angioedema       Problem List:    Patient Active Problem List   Diagnosis Code    Primary osteoarthritis of left hip M16.12    Essential hypertension I10    Acute blood loss as cause of postoperative anemia D62    Osteoarthritis of multiple joints M15.9    New onset atrial flutter (HCC) I48.92    Acute decompensated heart failure (HCC) I50.9    New onset of congestive heart failure (HCC) I50.9    Atrial flutter with rapid ventricular response (HCC) I48.92    Acidosis E87.2    Elevated LFTs R94.5    CAD in native artery I25.10       Past Medical History:        Diagnosis Date    Arthritis     CHF (congestive heart failure) (HCC)     Hypertension     New onset atrial flutter (HCC)     Preoperative clearance 08/18/2016    medical clearance on paper chart from Dr. Kushal Diamond.  Gerardo    Seasonal allergies        Past Surgical History:        Procedure Laterality Date    BACK SURGERY      DILATATION, ESOPHAGUS      HERNIA REPAIR      HIP SURGERY  09/13/2016    left total; hip arthroplasty       Social History:    Social History     Tobacco Use    Smoking status: Current Some Day Smoker     Packs/day: 0.25     Years: 15.00     Pack years: 3.75     Types: Cigarettes    Smokeless tobacco: Current User   Substance Use Topics    Alcohol use: Yes     Alcohol/week: 3.0 standard drinks     Types: 3 Shots of liquor per week                                Ready to quit: Not Answered  Counseling given: Not Answered      Vital Signs (Current):   Vitals:    08/19/20 0039 08/19/20 0600 08/19/20 0915   BP: 137/74  (!) 122/98   Pulse: 53  68   Resp: 16  16   Temp: 97.4 °F (36.3 °C)  98.3 °F (36.8 °C)   TempSrc: Oral  Oral   SpO2: 98%  96%   Weight:  229 lb 12.8 oz (104.2 kg)    Height:  6' (1.829 m)                                               BP Readings from Last 3 Encounters:   08/19/20 (!) 122/98   08/18/20 (!) 141/73   08/18/20 (!) 139/92       NPO Status:                                                   Date of last liquid consumption: 08/18/20                        Date of last solid food consumption: 08/18/20    BMI:   Wt Readings from Last 3 Encounters:   08/19/20 229 lb 12.8 oz (104.2 kg)   08/18/20 188 lb (85.3 kg)   08/18/20 233 lb 4.8 oz (105.8 kg)     Body mass index is 31.17 kg/m². CBC:   Lab Results   Component Value Date    WBC 3.8 08/18/2020    RBC 4.06 08/18/2020    HGB 14.2 08/18/2020    HCT 41.8 08/18/2020    .0 08/18/2020    RDW 13.5 08/18/2020     08/18/2020       CMP:   Lab Results   Component Value Date     08/19/2020    K 4.4 08/19/2020    CL 99 08/19/2020    CO2 28 08/19/2020    BUN 16 08/19/2020    CREATININE 1.3 08/19/2020    GFRAA >60 08/19/2020    LABGLOM >60 08/19/2020    GLUCOSE 91 08/19/2020    PROT 6.0 08/19/2020    CALCIUM 8.7 08/19/2020    BILITOT 1.0 08/19/2020    ALKPHOS 42 08/19/2020    AST 45 08/19/2020     08/19/2020       POC Tests: No results for input(s): POCGLU, POCNA, POCK, POCCL, POCBUN, POCHEMO, POCHCT in the last 72 hours.     Coags:   Lab Results   Component Value Date    PROTIME 12.8 08/15/2020    INR 1.1 08/15/2020    APTT 31.5 08/15/2020       HCG (If Applicable): No results found for: Walker Hansen, HCG, HCGQUANT     ABGs: No results found for: PHART, PO2ART, HKM2BJM, DZA5KKB, BEART, N5KWUZOT     Type & Screen (If Applicable):  No results found for: LABABO, LABRH    Drug/Infectious Status (If Applicable):  No results found for: HIV, HEPCAB    COVID-19 Screening (If Applicable):   Lab Results   Component Value Date    COVID19 Not Detected 08/15/2020         Anesthesia Evaluation  Patient summary reviewed  Airway: Mallampati: III  TM distance: >3 FB   Neck ROM: full  Mouth opening: > = 3 FB Dental: normal exam         Pulmonary:Negative Pulmonary ROS breath sounds clear to auscultation                             Cardiovascular:    (+) hypertension:, CAD:, dysrhythmias: atrial flutter, CHF:,       ECG reviewed  Rhythm: regular  Rate: normal           Beta Blocker:  Not on Beta Blocker         Neuro/Psych:   Negative Neuro/Psych ROS              GI/Hepatic/Renal: Neg GI/Hepatic/Renal ROS            Endo/Other: Negative Endo/Other ROS   (+) blood dyscrasia: anticoagulation therapy:., .                 Abdominal:       Abdomen: soft. Vascular:                                        Anesthesia Plan      MAC     ASA 3       Induction: intravenous. Anesthetic plan and risks discussed with patient. Plan discussed with CRNA.                   Duc Myles MD   8/19/2020

## 2020-08-19 NOTE — DISCHARGE SUMMARY
[facility] EMERGENCY SERVICE Physician Discharge Summary       Sade La MD  [id number] [address] [address]  [phone]            Activity level: As tolerated    Diet: DIET GENERAL;    Dispo:Home    Condition at discharge: Stable    Patient ID:  Ashley Levin  [id number]  48 y.o.  1960    Admit date: 8/19/2020    Discharge date and time:  8/20/2020  11:21 AM    Admission Diagnoses: Active Problems:    Essential hypertension    New onset atrial flutter (HCC)    Acute decompensated heart failure ([address]    New onset of congestive heart failure (HCC)    Acute diastolic CHF (congestive heart failure) ([address]  Resolved Problems:    * No resolved hospital problems. *      Discharge Diagnoses: Active Problems:    Essential hypertension    New onset atrial flutter (HCC)    Acute decompensated heart failure ([address]    New onset of congestive heart failure (HCC)    Acute diastolic CHF (congestive heart failure) ([address]  Resolved Problems:    * No resolved hospital problems. *      Consults:  IP CONSULT TO CARDIOLOGY    Procedures:   Echo  8-16-20   Summary   Left ventricle is moderately enlarged . Ejection fraction is visually estimated at 20-25%. Atrial fibrillation may   affect the evaluation of LV systolic function. Overall ejection fraction severely decreased . Abnormal LV diastolic function with elevated filling pressures (E/e' >11). The left atrium is severely dilated. Mildly dilated right ventricle. Right ventricle global systolic function is low normal . TAPSE 17 mm. Moderate to severe mitral regurgitation with centrally directed jet. No hemodynamically significant aortic stenosis is present. Moderate tricuspid regurgitation. RVSP is 36 mmHg. Normal estimated PA systolic pressure. No evidence for hemodynamically significant pericardial effusion. No previous echo for comparison. Cardiac Catheterization 8-18-20  IMPRESSION:  1.   Questionable 50% discrete distal RCA stenosis versus most likely  overlap of the proximal segment of PLV and PDA. 2.  Absence of angiographic stenosis in the left main, LAD or left  circumflex. 3.  Elevated LVEDP at 23 mmHg. RECOMMENDATIONS:  1. GDMT. 2.  Aggressive medical therapy. Hospital Course: Patient was admitted with Acute diastolic CHF (congestive heart failure) ([facility] [I50.31]. Patient is a 53-year-old male with a past medical history of hypertension who presented to the ED on 8-15-20 complaining of lower extremity swelling and shortness of breath. Upon admission patient was found to have atrial fibrillation with rapid ventricular response, he was initially started on Cardizem drip. Cardiology was consulted, patient underwent echocardiogram which revealed as above. Patient did have ejection fraction of 20 to 25%, underwent cardiac catheterization which showed no significant coronary artery disease. Patient underwent DC cardioversion and was placed on Eliquis for anticoagulation. He is now in sinus rhythm. Cardiology would like patient to be discharged on amiodarone 400 mg x 10 days followed by amiodarone 200 mg x 10 days. Cardiology states patient is okay for discharge. Cardiology states there is plan for cardiac MRI as outpatient. Pt is set up to receive life vest prior to d/c. Patient will be discharged home today to follow-up with primary care doctor as well as cardiology.     Discharge Exam:  Vitals:    08/19/20 2100 08/20/20 0014 08/20/20 0135 08/20/20 0812   BP: 117/81 106/67  117/67   Pulse: 76 79  85   Resp: 16 18  18   Temp: 97.6 °F (36.4 °C) 97.8 °F (36.6 °C)  98.2 °F (36.8 °C)   TempSrc: Oral Oral  Oral   SpO2: 93% 93%  96%   Weight:   228 lb (103.4 kg)    Height:           General Appearance: in no acute distress and alert  Skin: warm and dry, no rash or erythema  Pulmonary/Chest: clear to auscultation bilaterally- no wheezes, rales or rhonchi, normal air movement, no respiratory distress  Cardiovascular: normal rate, irregular rhythm, normal S1 and S2, no murmurs, no gallops, intact distal pulses and no JVD  Extremities: no cyanosis, no clubbing and no edema    I/O last 3 completed shifts: In: 540 [P.O.:240; I.V.:300]  Out: -   No intake/output data recorded. LABS:  Recent Labs     08/18/20 0515 08/19/20  0459 08/20/20  0423    139 136   K 3.7 4.4 4.1    99 98   CO2 25 28 29   BUN 18 16 13   CREATININE 1.3* 1.3* 1.3*   GLUCOSE 85 91 92   CALCIUM 8.9 8.7 8.5*       Recent Labs     08/18/20 0515 08/20/20  0423   WBC 3.8* 3.2*   RBC 4.06 3.79*   HGB 14.2 12.9   HCT 41.8 39.4   .0* 104.0*   MCH 35.0 34.0   MCHC 34.0 32.7   RDW 13.5 13.3    187   MPV 11.5 10.7       No results for input(s): POCGLU in the last 72 hours. Imaging:   No orders to display       Patient Instructions:      Medication List      START taking these medications    amiodarone 200 MG tablet  Commonly known as:  CORDARONE  Take 2 tablets by mouth 2 times daily for 10 days, THEN 1 tablet daily for 10 days.   Start taking on:  August 20, 2020     apixaban 5 MG Tabs tablet  Commonly known as:  ELIQUIS  Take 1 tablet by mouth 2 times daily     furosemide 40 MG tablet  Commonly known as:  LASIX  Take 1 tablet by mouth daily     hydrALAZINE 25 MG tablet  Commonly known as:  APRESOLINE  Take 1 tablet by mouth every 8 hours     isosorbide dinitrate 20 MG tablet  Commonly known as:  ISORDIL  Take 1 tablet by mouth 3 times daily     metoprolol succinate 25 MG extended release tablet  Commonly known as:  TOPROL XL  Take 3 tablets by mouth daily  Start taking on:  August 21, 2020        CONTINUE taking these medications    aspirin 325 MG EC tablet  Take 1 tablet by mouth 2 times daily     naproxen 500 MG tablet  Commonly known as:  Naprosyn  Take 1 tablet by mouth 2 times daily for 30 doses        STOP taking these medications    amLODIPine 2.5 MG tablet  Commonly known as:  NORVASC           Where to Get Your Medications These medications were sent to [place] [address] [address]  [phone] Jonah Henderson [phone]  [address] [facility] [phone]    Hours:  24-hours Phone:  [phone]   · amiodarone 200 MG tablet  · apixaban 5 MG Tabs tablet  · furosemide 40 MG tablet  · hydrALAZINE 25 MG tablet  · isosorbide dinitrate 20 MG tablet  · metoprolol succinate 25 MG extended release tablet           Note that more than 30 minutes was spent in preparing discharge papers, discussing discharge with patient, medication review, etc.    Signed:  Electronically signed by Gabriel Sheehan PA-C on 8/20/2020 at 11:21 AM    NOTE: This report was transcribed using voice recognition software. Every effort was made to ensure accuracy; however, inadvertent computerized transcription errors may be present. HOSPITALIST ATTENDING PHYSICIAN NOTE 8/20/2020 1734PM:    Details of the evaluation - subjective assessment (including medication profile, past medical, family and social history when applicable), examination, review of lab and test data, diagnostic impressions and medical decision making - performed by Gabriel Sheehan PA-C, were discussed with me on the date of service and I agree with clinical information herein unless otherwise noted. The patient has been evaluated by me personally earlier today. Pt reports no fevers, chills,n/v.     Exam: rhythm reg at rate of 80,lungs cta, abd pos bs soft nt, ext pos for mild b/l le edema    I agree with the assessment and plan of Ashley Mosher PA-C    Acute systolic chf  Atrial flutter  htn  Acidosis  Hyperglycemia  Nonischemic cardiomyopathy  Elevated lfts    Total time for discharge is 37 min    Electronically signed by Michelle Rodriguez D.O.   Hospitalist  [facility]ist [facility] at [facility]

## 2020-08-19 NOTE — PLAN OF CARE
Echocardiogram reports he EF 20 to 25%. Nursing did reach out to cardiology regarding possible need for LifeVest.  Covering physician not familiar with case and unable to state if patient definitely does or does not need LifeVest.  Due to this reasoning I discussed with Dr. Theodora Buck and we will hold discharge until tomorrow when we get a definitive answer.

## 2020-08-19 NOTE — PROCEDURES
Preprocedure diagnosis: Typical flutter  Procedures, cardioversion elective arrhythmia external    Preprocedure diagnosis: A. Fib/flutter    Prior tests anticoagulated    Primary procedure  Written informed consent was obtained, the VICTORIA was performed under stable fasting condition, self-adhesive anterior-posterior defibrillation pads were applied, the defibrillator was programmed to deliver energy in a synchronized fashion with a EKG. The patient was set up for monitoring of surface EKG and pulse oximetry continuously. Blood pressure was monitored and with automatic cuff measurements. Supplemental oxygen was administered. The procedure was performed under anesthesia by anesthesiology. After ensuring adequate anesthesia, DC CV was performed by delivering 200 J of direct current delivered in a synchronized fashion. Result: Successful cardioversion to sinus rhythm, confirmed on 12-lead EKG. Complication: None    Patient was monitored until awake and vitals remained stable. María Carrillo M.D.   [facility] cardiology

## 2020-08-19 NOTE — H&P
Please refer to the H&P before the transfer as the patient was transferred from our facility to [facility] and was supposed to stay there and he ended up coming back.   Please refer to my progress note from today

## 2020-08-19 NOTE — PROGRESS NOTES
Addendum: I have personally participated in the history, exam, medical decision making with Jeison Parkinson NP on the date of service, I have personally reviewed imaging and lab data as well as EKG and I agree with all of the pertinent clinical information unless otherwise noted. I have also reviewed and agree with the past medical, family, and social history unless otherwise noted. Please link this note to the NP/PA note of the same date 8/19/2020  Patient reported feeling well  Patient was transferred downtown yesterday for cardiac cath and we were informed that the patient will not return to the hospital apparently later in the evening the patient was transferred back    PHYSICAL EXAM:  Vitals:  /74   Pulse 53   Temp 97.4 °F (36.3 °C) (Oral)   Resp 16   SpO2 98%   Lungs are with some crackles bilaterally no wheezing. Heart S1-S2. Abdomen soft nontender nondistended positive bowel sounds. Extremities +1 peripheral edema. Impression:  Active Problems:    Essential hypertension    New onset atrial flutter (HCC)    Acute decompensated heart failure ([address]    New onset of congestive heart failure ([address]  Resolved Problems:    * No resolved hospital problems. *      My findings/plan include:  1.  A. fib with RVR, rate is controlled now, Toprol-XL increased to 50 mg twice daily, weaned off Cardizem drip, chads-vas score of 2, needs anticoagulation, continue Lovenox subcutaneously, patient received cardiac cath on 8/18/2020 with questionable 50% stenosis discrete distal RCA versus which is more likely overlap of proximal segment of PLV and PDA. Plans for VICTORIA DC cardioversion after cath with antiarrhythmic therapy  2. New onset cardiomyopathy, echo showed ejection fraction of 20 to 25%, need to rule out ischemic etiology, patient is for cardiac cath today, for now continue on diuretics.   Fluid balance -2.3 L.  3.  Acute renal failure, most likely due to diuretics, creatinine is at 1.3, will continue to monitor      NOTE: This report was transcribed using voice recognition software. Every effort was made to ensure accuracy; however, inadvertent computerized transcription errors may be present.   Electronically signed by Irma Ayala MD on 8/19/2020 at 4:41 AM

## 2020-08-19 NOTE — PROGRESS NOTES
Spoke to cardiology, patient can be discharged on amiodarone, orders are placed by down for loading dose for 10 days and 10 200 daily. Reviewed the chart and spoke to NP, will continue with DC process and patient is to see cardiology follow-up in office next week.

## 2020-08-20 VITALS
DIASTOLIC BLOOD PRESSURE: 84 MMHG | TEMPERATURE: 97.8 F | RESPIRATION RATE: 18 BRPM | BODY MASS INDEX: 30.88 KG/M2 | HEART RATE: 76 BPM | WEIGHT: 228 LBS | OXYGEN SATURATION: 97 % | SYSTOLIC BLOOD PRESSURE: 119 MMHG | HEIGHT: 72 IN

## 2020-08-20 LAB
ALBUMIN SERPL-MCNC: 3.4 G/DL (ref 3.5–5.2)
ALP BLD-CCNC: 40 U/L (ref 40–129)
ALT SERPL-CCNC: 93 U/L (ref 0–40)
ANION GAP SERPL CALCULATED.3IONS-SCNC: 9 MMOL/L (ref 7–16)
AST SERPL-CCNC: 37 U/L (ref 0–39)
BASOPHILS ABSOLUTE: 0.02 E9/L (ref 0–0.2)
BASOPHILS RELATIVE PERCENT: 0.6 % (ref 0–2)
BILIRUB SERPL-MCNC: 1 MG/DL (ref 0–1.2)
BILIRUBIN DIRECT: 0.3 MG/DL (ref 0–0.3)
BILIRUBIN, INDIRECT: 0.7 MG/DL (ref 0–1)
BUN BLDV-MCNC: 13 MG/DL (ref 6–20)
CALCIUM SERPL-MCNC: 8.5 MG/DL (ref 8.6–10.2)
CHLORIDE BLD-SCNC: 98 MMOL/L (ref 98–107)
CO2: 29 MMOL/L (ref 22–29)
CREAT SERPL-MCNC: 1.3 MG/DL (ref 0.7–1.2)
EKG ATRIAL RATE: 75 BPM
EKG P AXIS: 49 DEGREES
EKG P-R INTERVAL: 172 MS
EKG Q-T INTERVAL: 406 MS
EKG QRS DURATION: 86 MS
EKG QTC CALCULATION (BAZETT): 453 MS
EKG R AXIS: -6 DEGREES
EKG T AXIS: 51 DEGREES
EKG VENTRICULAR RATE: 75 BPM
EOSINOPHILS ABSOLUTE: 0.12 E9/L (ref 0.05–0.5)
EOSINOPHILS RELATIVE PERCENT: 3.7 % (ref 0–6)
GFR AFRICAN AMERICAN: >60
GFR NON-AFRICAN AMERICAN: >60 ML/MIN/1.73
GLUCOSE BLD-MCNC: 92 MG/DL (ref 74–99)
HCT VFR BLD CALC: 39.4 % (ref 37–54)
HEMOGLOBIN: 12.9 G/DL (ref 12.5–16.5)
IMMATURE GRANULOCYTES #: 0.01 E9/L
IMMATURE GRANULOCYTES %: 0.3 % (ref 0–5)
LYMPHOCYTES ABSOLUTE: 0.97 E9/L (ref 1.5–4)
LYMPHOCYTES RELATIVE PERCENT: 30 % (ref 20–42)
MCH RBC QN AUTO: 34 PG (ref 26–35)
MCHC RBC AUTO-ENTMCNC: 32.7 % (ref 32–34.5)
MCV RBC AUTO: 104 FL (ref 80–99.9)
MONOCYTES ABSOLUTE: 0.61 E9/L (ref 0.1–0.95)
MONOCYTES RELATIVE PERCENT: 18.9 % (ref 2–12)
NEUTROPHILS ABSOLUTE: 1.5 E9/L (ref 1.8–7.3)
NEUTROPHILS RELATIVE PERCENT: 46.5 % (ref 43–80)
PDW BLD-RTO: 13.3 FL (ref 11.5–15)
PLATELET # BLD: 187 E9/L (ref 130–450)
PMV BLD AUTO: 10.7 FL (ref 7–12)
POTASSIUM REFLEX MAGNESIUM: 4.1 MMOL/L (ref 3.5–5)
RBC # BLD: 3.79 E12/L (ref 3.8–5.8)
SODIUM BLD-SCNC: 136 MMOL/L (ref 132–146)
TOTAL PROTEIN: 5.8 G/DL (ref 6.4–8.3)
WBC # BLD: 3.2 E9/L (ref 4.5–11.5)

## 2020-08-20 PROCEDURE — 2580000003 HC RX 258: Performed by: INTERNAL MEDICINE

## 2020-08-20 PROCEDURE — 99239 HOSP IP/OBS DSCHRG MGMT >30: CPT | Performed by: INTERNAL MEDICINE

## 2020-08-20 PROCEDURE — 6370000000 HC RX 637 (ALT 250 FOR IP): Performed by: INTERNAL MEDICINE

## 2020-08-20 PROCEDURE — 85025 COMPLETE CBC W/AUTO DIFF WBC: CPT

## 2020-08-20 PROCEDURE — 36415 COLL VENOUS BLD VENIPUNCTURE: CPT

## 2020-08-20 PROCEDURE — APPSS45 APP SPLIT SHARED TIME 31-45 MINUTES: Performed by: PHYSICIAN ASSISTANT

## 2020-08-20 PROCEDURE — 80048 BASIC METABOLIC PNL TOTAL CA: CPT

## 2020-08-20 PROCEDURE — 99232 SBSQ HOSP IP/OBS MODERATE 35: CPT | Performed by: INTERNAL MEDICINE

## 2020-08-20 PROCEDURE — 80076 HEPATIC FUNCTION PANEL: CPT

## 2020-08-20 PROCEDURE — 93010 ELECTROCARDIOGRAM REPORT: CPT | Performed by: INTERNAL MEDICINE

## 2020-08-20 RX ORDER — HYDRALAZINE HYDROCHLORIDE 25 MG/1
25 TABLET, FILM COATED ORAL EVERY 8 HOURS SCHEDULED
Qty: 90 TABLET | Refills: 3 | Status: SHIPPED | OUTPATIENT
Start: 2020-08-20 | End: 2021-07-16 | Stop reason: SDUPTHER

## 2020-08-20 RX ORDER — FUROSEMIDE 40 MG/1
40 TABLET ORAL DAILY
Qty: 30 TABLET | Refills: 0 | Status: SHIPPED | OUTPATIENT
Start: 2020-08-20 | End: 2020-09-25 | Stop reason: SDUPTHER

## 2020-08-20 RX ORDER — ISOSORBIDE DINITRATE 20 MG/1
20 TABLET ORAL 3 TIMES DAILY
Qty: 90 TABLET | Refills: 0 | Status: SHIPPED | OUTPATIENT
Start: 2020-08-20 | End: 2020-09-25 | Stop reason: SDUPTHER

## 2020-08-20 RX ORDER — AMIODARONE HYDROCHLORIDE 200 MG/1
TABLET ORAL
Qty: 50 TABLET | Refills: 0 | Status: ON HOLD
Start: 2020-08-20 | End: 2020-08-27 | Stop reason: HOSPADM

## 2020-08-20 RX ORDER — METOPROLOL SUCCINATE 25 MG/1
75 TABLET, EXTENDED RELEASE ORAL DAILY
Qty: 90 TABLET | Refills: 0 | Status: SHIPPED | OUTPATIENT
Start: 2020-08-21 | End: 2020-09-09 | Stop reason: SDUPTHER

## 2020-08-20 RX ADMIN — ISOSORBIDE DINITRATE 20 MG: 10 TABLET ORAL at 15:32

## 2020-08-20 RX ADMIN — HYDRALAZINE HYDROCHLORIDE 25 MG: 25 TABLET, FILM COATED ORAL at 15:32

## 2020-08-20 RX ADMIN — ISOSORBIDE DINITRATE 20 MG: 10 TABLET ORAL at 08:16

## 2020-08-20 RX ADMIN — METOPROLOL SUCCINATE 50 MG: 50 TABLET, EXTENDED RELEASE ORAL at 08:16

## 2020-08-20 RX ADMIN — FUROSEMIDE 40 MG: 40 TABLET ORAL at 08:16

## 2020-08-20 RX ADMIN — SODIUM CHLORIDE, PRESERVATIVE FREE 10 ML: 5 INJECTION INTRAVENOUS at 08:16

## 2020-08-20 RX ADMIN — DIGOXIN 125 MCG: 125 TABLET ORAL at 08:16

## 2020-08-20 RX ADMIN — AMIODARONE HYDROCHLORIDE 400 MG: 200 TABLET ORAL at 08:16

## 2020-08-20 RX ADMIN — HYDRALAZINE HYDROCHLORIDE 25 MG: 25 TABLET, FILM COATED ORAL at 05:59

## 2020-08-20 RX ADMIN — APIXABAN 5 MG: 5 TABLET, FILM COATED ORAL at 08:16

## 2020-08-20 ASSESSMENT — PAIN SCALES - GENERAL
PAINLEVEL_OUTOF10: 0

## 2020-08-20 NOTE — CARE COORDINATION
Social Work/Discharge Planning:  DME order noted for Life Vest.  [facility] (ph: [phone]) with [employer] and left a voicemail regarding referral.  Will continue to follow and wait for return call. Electronically signed by LYNETTE Anne on 8/20/2020 at 9:18 AM    Addendum:  Referral made to Yessy Tucker with [facility]. Provided patient with an update regarding Life Vest.  Will continue to follow.   Electronically signed by LYNETTE Anne on 8/20/2020 at 9:51 AM

## 2020-08-20 NOTE — CARE COORDINATION
Social Work/Discharge Planning:  Shelly Holcomb at [place] (ph: [phone]) and confirmed co-pay for Tikosyn is $50.16, if needed at discharge. Notified patient and charge nurse. Called Virgil with [employer] and confirmed Life Vest was approved. Virgil states Kaley will deliver the [employer]	[place] and he will confirm time. Patient notified. Electronically signed by LYNETTE Orozco on 8/20/2020 at 12:31 PM     Addendum:  Notified patient that Kaley with [facility] will be in today at 4:30pm to fit him for the Life Vest.  Will continue to follow.   Electronically signed by LYNETTE Orozco on 8/20/2020 at 1:37 PM

## 2020-08-20 NOTE — PLAN OF CARE
Problem: Safety:  Goal: Free from accidental physical injury  Description: Free from accidental physical injury  Outcome: Met This Shift  Goal: Free from intentional harm  Description: Free from intentional harm  Outcome: Met This Shift     Problem: Daily Care:  Goal: Daily care needs are met  Description: Daily care needs are met  Outcome: Met This Shift     Problem: Pain:  Goal: Patient's pain/discomfort is manageable  Description: Patient's pain/discomfort is manageable  Outcome: Met This Shift     Problem: Discharge Planning:  Goal: Patients continuum of care needs are met  Description: Patients continuum of care needs are met  Outcome: Met This Shift

## 2020-08-20 NOTE — PROGRESS NOTES
INPATIENT CARDIOLOGY FOLLOW-UP    Name: Jarred Mi    Age: 64 y.o. Date of Admission: 8/19/2020 12:13 AM    Date of Service: 8/20/2020    Primary Cardiologist: Dr. Debbie Griffith    Chief Complaint: Follow-up for atrial flutter, NICM    Interim History:  Feels well. Maintaining sinus rhythm after VICTORIA/DC cardioversion yesterday. Denies chest pain or shortness of breath.     Review of Systems:   Negative except as described above    Problem List:  Patient Active Problem List   Diagnosis    Primary osteoarthritis of left hip    Essential hypertension    Acute blood loss as cause of postoperative anemia    Osteoarthritis of multiple joints    New onset atrial flutter (HCC)    Acute decompensated heart failure (HCC)    New onset of congestive heart failure (HCC)    Atrial flutter with rapid ventricular response (HCC)    Acidosis    Elevated LFTs    CAD in native artery    Acute diastolic CHF (congestive heart failure) (HCC)       Current Medications:    Current Facility-Administered Medications:     sodium chloride flush 0.9 % injection 10 mL, 10 mL, Intravenous, 2 times per day, Cholo Marion MD, 10 mL at 08/20/20 0816    sodium chloride flush 0.9 % injection 10 mL, 10 mL, Intravenous, PRN, Cholo Marion MD    potassium chloride (KLOR-CON M) extended release tablet 40 mEq, 40 mEq, Oral, PRN **OR** potassium bicarb-citric acid (EFFER-K) effervescent tablet 40 mEq, 40 mEq, Oral, PRN **OR** potassium chloride 10 mEq/100 mL IVPB (Peripheral Line), 10 mEq, Intravenous, PRN, Cholo Marion MD    magnesium sulfate 1 g in dextrose 5% 100 mL IVPB, 1 g, Intravenous, PRN, Cholo Marion MD    acetaminophen (TYLENOL) tablet 650 mg, 650 mg, Oral, Q6H PRN **OR** acetaminophen (TYLENOL) suppository 650 mg, 650 mg, Rectal, Q6H PRN, Cholo Marion MD    magnesium hydroxide (MILK OF MAGNESIA) 400 MG/5ML suspension 30 mL, 30 mL, Oral, Daily PRN, Cholo Marion MD    promethazine (PHENERGAN) tablet 12.5 mg, 12.5 mg, Oral, Q6H PRN **OR** ondansetron (ZOFRAN) injection 4 mg, 4 mg, Intravenous, Q6H PRN, Socrates Atkins MD    apixaban (ELIQUIS) tablet 5 mg, 5 mg, Oral, BID, Rahel Mcginnis MD, 5 mg at 08/20/20 0816    metoprolol succinate (TOPROL XL) extended release tablet 50 mg, 50 mg, Oral, Daily, Rahel Mcginnis MD, 50 mg at 08/20/20 0816    isosorbide dinitrate (ISORDIL) tablet 20 mg, 20 mg, Oral, TID, Socrates Atkins MD, 20 mg at 08/20/20 0816    hydrALAZINE (APRESOLINE) tablet 25 mg, 25 mg, Oral, 3 times per day, Socrates Atkins MD, 25 mg at 08/20/20 0559    digoxin (LANOXIN) tablet 125 mcg, 125 mcg, Oral, Daily, Socrates Atkins MD, 125 mcg at 08/20/20 0816    amiodarone (CORDARONE) tablet 400 mg, 400 mg, Oral, BID, 400 mg at 08/20/20 0816 **FOLLOWED BY** [START ON 8/30/2020] amiodarone (CORDARONE) tablet 200 mg, 200 mg, Oral, Daily, Bakari Marx MD    furosemide (LASIX) tablet 40 mg, 40 mg, Oral, Daily, Bakari Marx MD, 40 mg at 08/20/20 0816    Physical Exam:  /67   Pulse 85   Temp 98.2 °F (36.8 °C) (Oral)   Resp 18   Ht 6' (1.829 m)   Wt 228 lb (103.4 kg)   SpO2 96%   BMI 30.92 kg/m²   Wt Readings from Last 3 Encounters:   08/20/20 228 lb (103.4 kg)   08/18/20 188 lb (85.3 kg)   08/18/20 233 lb 4.8 oz (105.8 kg)     Appearance: Awake, alert, no acute respiratory distress  Skin: Intact, no rash  Head: Normocephalic, atraumatic  Eyes: EOMI, no conjunctival erythema  ENMT: No pharyngeal erythema, MMM, no rhinorrhea  Neck: Supple, no elevated JVP, no carotid bruits  Lungs: Clear to auscultation bilaterally. No wheezes, rales, or rhonchi.   Cardiac: PMI nondisplaced, Regular rhythm with a normal rate, S1 & S2 normal, soft systolic murmur left sternal border  Abdomen: Soft, nontender, +bowel sounds  Extremities: Moves all extremities x 4, trace lower extremity edema greater on the right  Neurologic: No focal motor deficits apparent, normal mood and affect  Peripheral Pulses: Intact posterior tibial pulses bilaterally    Intake/Output:    Intake/Output Summary (Last 24 hours) at 8/20/2020 0846  Last data filed at 8/19/2020 1820  Gross per 24 hour   Intake 540 ml   Output --   Net 540 ml     No intake/output data recorded. Laboratory Tests:  Recent Labs     08/18/20 0515 08/19/20 0459 08/20/20  0423    139 136   K 3.7 4.4 4.1    99 98   CO2 25 28 29   BUN 18 16 13   CREATININE 1.3* 1.3* 1.3*   GLUCOSE 85 91 92   CALCIUM 8.9 8.7 8.5*     Lab Results   Component Value Date    MG 2.0 08/17/2020     Recent Labs     08/18/20 0515 08/19/20 0459 08/20/20  0423   ALKPHOS 43 42 40   * 121* 93*   AST 74* 45* 37   PROT 6.0* 6.0* 5.8*   BILITOT 1.1 1.0 1.0   BILIDIR 0.3 0.3 0.3   LABALBU 3.5 3.5 3.4*     Recent Labs     08/18/20 0515 08/20/20 0423   WBC 3.8* 3.2*   RBC 4.06 3.79*   HGB 14.2 12.9   HCT 41.8 39.4   .0* 104.0*   MCH 35.0 34.0   MCHC 34.0 32.7   RDW 13.5 13.3    187   MPV 11.5 10.7     Lab Results   Component Value Date    TROPONINI 0.01 08/16/2020    TROPONINI <0.01 08/15/2020    TROPONINI <0.01 07/27/2016     Lab Results   Component Value Date    INR 1.1 08/15/2020    PROTIME 12.8 (H) 08/15/2020     Lab Results   Component Value Date    TSH 3.300 08/15/2020     Lab Results   Component Value Date    LABA1C 4.6 08/16/2020     No results found for: EAG  No results found for: CHOL  No results found for: TRIG  Lab Results   Component Value Date    HDL 57 08/16/2020     Lab Results   Component Value Date    LDLCALC 83 08/16/2020     Lab Results   Component Value Date    LABVLDL 15 08/16/2020     No results found for: CHOLHDLRATIO  No results for input(s): PROBNP in the last 72 hours. Cardiac Tests:    EKG: Sinus rhythm 75 bpm.  Normal axis normal intervals. Left atrial large man. Telemetry: Atrial flutter -> normal sinus rhythm    Echocardiogram:   TTE 8/16/20   Summary   Left ventricle is moderately enlarged . Ejection fraction is visually estimated at 20-25%.  Atrial fibrillation may   affect the evaluation of LV systolic function. Overall ejection fraction severely decreased . Abnormal LV diastolic function with elevated filling pressures (E/e' >11). The left atrium is severely dilated. Mildly dilated right ventricle. Right ventricle global systolic function is low normal . TAPSE 17 mm. Moderate to severe mitral regurgitation with centrally directed jet. No hemodynamically significant aortic stenosis is present. Moderate tricuspid regurgitation. RVSP is 36 mmHg. Normal estimated PA systolic pressure. No evidence for hemodynamically significant pericardial effusion. No previous echo for comparison. VICTORIA 8/19/20   Summary   Left ventricle is moderately enlarged . Ejection fraction is visually estimated at 20-25%. Overall ejection fraction severely decreased . Normal left ventricle wall thickness. Severely dilated left atrium. No evidence of thrombus within left atrium or appendage. Normal right ventricular size and function. Moderate mitral regurgitation with centrally directed jet. No hemodynamically significant aortic stenosis is present. Mild tricuspid regurgitation. RVSP is 27 mmHg. Normal estimated PA systolic pressure. No evidence for hemodynamically significant pericardial effusion. Stress test:      Cardiac catheterization:   CORONARY ANATOMY:  Left main:  It is a long and large artery with no angiographic stenosis  noted. LAD:  It is a large artery wrapping around the apex and giving rise to  two diagonal branches and a large first septal  and smaller  septal branches. The first diagonal was small. The second diagonal was  large and bifurcating. No angiographic stenosis noted in the LAD or its  branches. Left circumflex: It is a large artery giving rise to three obtuse  marginal branches. The first and second obtuse marginal branches were  very small.   The third obtuse marginal branch was bifurcating and  tortuous. No angiographic stenosis noted. RCA:  It is a large dominant artery giving rise to a conus branch, SA  woody or AV woody branch, an RV marginal branch, a large PDA and a large  PLV branch. There was questionable 50% discrete distal RCA stenosis  versus most likely overlap of the proximal segment of the PDA and of  the PLV branch.     IMPRESSION:  1. Questionable 50% discrete distal RCA stenosis versus most likely  overlap of the proximal segment of PLV and PDA. 2.  Absence of angiographic stenosis in the left main, LAD or left  circumflex. 3.  Elevated LVEDP at 23 mmHg.    ----------------------------------------------------------------------------------------------------------------------------------------------------------------  IMPRESSION:  1. New onset typical atrial flutter. S/p VICTORIA/DCC 8/19/20. Maintaining sinus  2. Acute HFrEF, resolved. 3. Nonischemic cardiomyopathy, likely tachycardia mediated. EF 20 to 25%  4. Moderate MR, mild TR  5. CKD creatinine 1.3  6. Comorbid disease: Hypertension, tobacco abuse recently quit, osteoarthritis, history of angioedema with ACE inhibitor, abnormal LFTs    RECOMMENDATIONS:     Continue amiodarone for short-term use to maintain sinus rhythm   Continue apixaban for stroke risk reduction   Continue oral furosemide   Discontinue digoxin to optimize heart failure therapy, because amiodarone has been started to avoid bradycardia   Continue GDMT: Increase metoprolol succinate to 75 mg daily, continue hydralazine/isosorbide dinitrate   No ACE/ARB/ARNI due to history of angioedema   Consider spironolactone as outpatient   Aggressive risk factor modification   Discussed recommendation for wearable cardioverter defibrillator for SCD prophylaxis, patient agreeable.   If delays with discharge due to insurance issues, he can be fitted for vest as outpatient   Okay for discharge today from cardiology standpoint, close outpatient follow-up with  Kd Natarajan Plans for outpatient MRI, outpatient EP referral as well    Marbin Villarreal MD  [facility] ([facility]) Cardiology    NOTE: This report was transcribed using voice recognition software. Every effort was made to ensure accuracy; however, inadvertent computerized transcription errors may be present.

## 2020-08-20 NOTE — CARE COORDINATION
Met with patient at bedside presented pt with Eliquis discount cards. Educated patient on the importance of continuing medications regimen with out any delays and or disruptions- Pt verbalized understanding, noting that he is to follow up with Dr. Alea Harrington in 10 days. Also updated pt that Aleyda craft will be at hospital today to fit patient for vest. Pt verbalized understanding. Pt denies any further discharge needs.

## 2020-08-25 ENCOUNTER — TELEPHONE (OUTPATIENT)
Dept: OTHER | Facility: CLINIC | Age: 60
End: 2020-08-25

## 2020-08-25 ENCOUNTER — APPOINTMENT (OUTPATIENT)
Dept: GENERAL RADIOLOGY | Age: 60
DRG: 227 | End: 2020-08-25
Payer: OTHER GOVERNMENT

## 2020-08-25 ENCOUNTER — TELEPHONE (OUTPATIENT)
Dept: CARDIOLOGY CLINIC | Age: 60
End: 2020-08-25

## 2020-08-25 ENCOUNTER — HOSPITAL ENCOUNTER (INPATIENT)
Age: 60
LOS: 2 days | Discharge: HOME | DRG: 227 | End: 2020-08-27
Attending: EMERGENCY MEDICINE | Admitting: INTERNAL MEDICINE
Payer: OTHER GOVERNMENT

## 2020-08-25 PROBLEM — I47.20 VENTRICULAR TACHYCARDIA (HCC): Status: ACTIVE | Noted: 2020-08-25

## 2020-08-25 LAB
ALBUMIN SERPL-MCNC: 3.8 G/DL (ref 3.5–5.2)
ALP BLD-CCNC: 46 U/L (ref 40–129)
ALT SERPL-CCNC: 88 U/L (ref 0–40)
ANION GAP SERPL CALCULATED.3IONS-SCNC: 12 MMOL/L (ref 7–16)
AST SERPL-CCNC: 56 U/L (ref 0–39)
BASOPHILS ABSOLUTE: 0.05 E9/L (ref 0–0.2)
BASOPHILS RELATIVE PERCENT: 1.1 % (ref 0–2)
BILIRUB SERPL-MCNC: 0.9 MG/DL (ref 0–1.2)
BUN BLDV-MCNC: 17 MG/DL (ref 6–20)
CALCIUM SERPL-MCNC: 8.8 MG/DL (ref 8.6–10.2)
CHLORIDE BLD-SCNC: 101 MMOL/L (ref 98–107)
CO2: 23 MMOL/L (ref 22–29)
CREAT SERPL-MCNC: 1.6 MG/DL (ref 0.7–1.2)
EOSINOPHILS ABSOLUTE: 0.13 E9/L (ref 0.05–0.5)
EOSINOPHILS RELATIVE PERCENT: 2.9 % (ref 0–6)
GFR AFRICAN AMERICAN: 54
GFR NON-AFRICAN AMERICAN: 54 ML/MIN/1.73
GLUCOSE BLD-MCNC: 104 MG/DL (ref 74–99)
HCT VFR BLD CALC: 42.8 % (ref 37–54)
HEMOGLOBIN: 14.2 G/DL (ref 12.5–16.5)
IMMATURE GRANULOCYTES #: 0.02 E9/L
IMMATURE GRANULOCYTES %: 0.4 % (ref 0–5)
INR BLD: 1.4
LYMPHOCYTES ABSOLUTE: 1.19 E9/L (ref 1.5–4)
LYMPHOCYTES RELATIVE PERCENT: 26.2 % (ref 20–42)
MAGNESIUM: 2.1 MG/DL (ref 1.6–2.6)
MCH RBC QN AUTO: 34.6 PG (ref 26–35)
MCHC RBC AUTO-ENTMCNC: 33.2 % (ref 32–34.5)
MCV RBC AUTO: 104.4 FL (ref 80–99.9)
MONOCYTES ABSOLUTE: 0.56 E9/L (ref 0.1–0.95)
MONOCYTES RELATIVE PERCENT: 12.3 % (ref 2–12)
NEUTROPHILS ABSOLUTE: 2.6 E9/L (ref 1.8–7.3)
NEUTROPHILS RELATIVE PERCENT: 57.1 % (ref 43–80)
PDW BLD-RTO: 12.9 FL (ref 11.5–15)
PLATELET # BLD: 255 E9/L (ref 130–450)
PMV BLD AUTO: 10.8 FL (ref 7–12)
POTASSIUM REFLEX MAGNESIUM: 4.2 MMOL/L (ref 3.5–5)
PRO-BNP: 1593 PG/ML (ref 0–125)
PROTHROMBIN TIME: 15.4 SEC (ref 9.3–12.4)
RBC # BLD: 4.1 E12/L (ref 3.8–5.8)
SODIUM BLD-SCNC: 136 MMOL/L (ref 132–146)
TOTAL PROTEIN: 6.2 G/DL (ref 6.4–8.3)
TROPONIN: <0.01 NG/ML (ref 0–0.03)
WBC # BLD: 4.6 E9/L (ref 4.5–11.5)

## 2020-08-25 PROCEDURE — 83735 ASSAY OF MAGNESIUM: CPT

## 2020-08-25 PROCEDURE — 85610 PROTHROMBIN TIME: CPT

## 2020-08-25 PROCEDURE — 80053 COMPREHEN METABOLIC PANEL: CPT

## 2020-08-25 PROCEDURE — 85025 COMPLETE CBC W/AUTO DIFF WBC: CPT

## 2020-08-25 PROCEDURE — 36415 COLL VENOUS BLD VENIPUNCTURE: CPT

## 2020-08-25 PROCEDURE — 2580000003 HC RX 258: Performed by: INTERNAL MEDICINE

## 2020-08-25 PROCEDURE — 6370000000 HC RX 637 (ALT 250 FOR IP): Performed by: INTERNAL MEDICINE

## 2020-08-25 PROCEDURE — 83880 ASSAY OF NATRIURETIC PEPTIDE: CPT

## 2020-08-25 PROCEDURE — 71045 X-RAY EXAM CHEST 1 VIEW: CPT

## 2020-08-25 PROCEDURE — 99283 EMERGENCY DEPT VISIT LOW MDM: CPT

## 2020-08-25 PROCEDURE — 93005 ELECTROCARDIOGRAM TRACING: CPT | Performed by: EMERGENCY MEDICINE

## 2020-08-25 PROCEDURE — 2140000000 HC CCU INTERMEDIATE R&B

## 2020-08-25 PROCEDURE — 99255 IP/OBS CONSLTJ NEW/EST HI 80: CPT | Performed by: INTERNAL MEDICINE

## 2020-08-25 PROCEDURE — 84484 ASSAY OF TROPONIN QUANT: CPT

## 2020-08-25 PROCEDURE — 99282 EMERGENCY DEPT VISIT SF MDM: CPT

## 2020-08-25 RX ORDER — PROMETHAZINE HYDROCHLORIDE 25 MG/1
12.5 TABLET ORAL EVERY 6 HOURS PRN
Status: DISCONTINUED | OUTPATIENT
Start: 2020-08-25 | End: 2020-08-27 | Stop reason: HOSPADM

## 2020-08-25 RX ORDER — FUROSEMIDE 40 MG/1
40 TABLET ORAL DAILY
Status: DISCONTINUED | OUTPATIENT
Start: 2020-08-25 | End: 2020-08-27 | Stop reason: HOSPADM

## 2020-08-25 RX ORDER — ACETAMINOPHEN 650 MG/1
650 SUPPOSITORY RECTAL EVERY 6 HOURS PRN
Status: DISCONTINUED | OUTPATIENT
Start: 2020-08-25 | End: 2020-08-27 | Stop reason: HOSPADM

## 2020-08-25 RX ORDER — SODIUM CHLORIDE 0.9 % (FLUSH) 0.9 %
10 SYRINGE (ML) INJECTION EVERY 12 HOURS SCHEDULED
Status: DISCONTINUED | OUTPATIENT
Start: 2020-08-25 | End: 2020-08-26 | Stop reason: SDUPTHER

## 2020-08-25 RX ORDER — METOPROLOL SUCCINATE 25 MG/1
75 TABLET, EXTENDED RELEASE ORAL DAILY
Status: DISCONTINUED | OUTPATIENT
Start: 2020-08-25 | End: 2020-08-27 | Stop reason: HOSPADM

## 2020-08-25 RX ORDER — POLYETHYLENE GLYCOL 3350 17 G/17G
17 POWDER, FOR SOLUTION ORAL DAILY PRN
Status: DISCONTINUED | OUTPATIENT
Start: 2020-08-25 | End: 2020-08-27 | Stop reason: HOSPADM

## 2020-08-25 RX ORDER — SODIUM CHLORIDE 0.9 % (FLUSH) 0.9 %
10 SYRINGE (ML) INJECTION PRN
Status: DISCONTINUED | OUTPATIENT
Start: 2020-08-25 | End: 2020-08-26 | Stop reason: SDUPTHER

## 2020-08-25 RX ORDER — AMIODARONE HYDROCHLORIDE 200 MG/1
200 TABLET ORAL DAILY
Status: DISCONTINUED | OUTPATIENT
Start: 2020-08-31 | End: 2020-08-27 | Stop reason: HOSPADM

## 2020-08-25 RX ORDER — AMIODARONE HYDROCHLORIDE 200 MG/1
400 TABLET ORAL 2 TIMES DAILY
Status: DISCONTINUED | OUTPATIENT
Start: 2020-08-25 | End: 2020-08-27 | Stop reason: HOSPADM

## 2020-08-25 RX ORDER — ONDANSETRON 2 MG/ML
4 INJECTION INTRAMUSCULAR; INTRAVENOUS EVERY 6 HOURS PRN
Status: DISCONTINUED | OUTPATIENT
Start: 2020-08-25 | End: 2020-08-25

## 2020-08-25 RX ORDER — ISOSORBIDE DINITRATE 10 MG/1
20 TABLET ORAL 3 TIMES DAILY
Status: DISCONTINUED | OUTPATIENT
Start: 2020-08-25 | End: 2020-08-27 | Stop reason: HOSPADM

## 2020-08-25 RX ORDER — HYDRALAZINE HYDROCHLORIDE 25 MG/1
25 TABLET, FILM COATED ORAL EVERY 8 HOURS SCHEDULED
Status: DISCONTINUED | OUTPATIENT
Start: 2020-08-25 | End: 2020-08-27 | Stop reason: HOSPADM

## 2020-08-25 RX ORDER — ASPIRIN 81 MG/1
81 TABLET, CHEWABLE ORAL DAILY
Status: DISCONTINUED | OUTPATIENT
Start: 2020-08-25 | End: 2020-08-27 | Stop reason: HOSPADM

## 2020-08-25 RX ORDER — ACETAMINOPHEN 325 MG/1
650 TABLET ORAL EVERY 6 HOURS PRN
Status: DISCONTINUED | OUTPATIENT
Start: 2020-08-25 | End: 2020-08-27 | Stop reason: HOSPADM

## 2020-08-25 RX ADMIN — FUROSEMIDE 40 MG: 40 TABLET ORAL at 22:24

## 2020-08-25 RX ADMIN — AMIODARONE HYDROCHLORIDE 400 MG: 200 TABLET ORAL at 22:24

## 2020-08-25 RX ADMIN — SODIUM CHLORIDE, PRESERVATIVE FREE 10 ML: 5 INJECTION INTRAVENOUS at 22:15

## 2020-08-25 ASSESSMENT — ENCOUNTER SYMPTOMS
RHINORRHEA: 0
DIARRHEA: 0
COUGH: 0
NAUSEA: 0
BACK PAIN: 0
ABDOMINAL PAIN: 0
VOMITING: 0
SHORTNESS OF BREATH: 0
CONSTIPATION: 0

## 2020-08-25 ASSESSMENT — PAIN SCALES - GENERAL: PAINLEVEL_OUTOF10: 0

## 2020-08-25 NOTE — ED PROVIDER NOTES
Patient is a 62-year-old male with a history of HFrEF with recent echo showing EF of 20-25% who presents to the emergency department after his LifeVest fired. Patient states he was in a stressful situation at his mother's house last Friday when his LifeVest alarmed and before he could silence the alarm it delivered a defibrillation. Patient endorses palpitations prior to the defibrillation, denies chest pain or shortness of breath. Patient has been asymptomatic since that event. Patient spoke to his cardiologist this morning who advised him to come to the emergency department for further evaluation. Patient here today is asymptomatic. Review of Systems   Constitutional: Negative for chills and fever. HENT: Negative for congestion and rhinorrhea. Eyes: Negative for visual disturbance. Respiratory: Negative for cough and shortness of breath. Cardiovascular: Positive for palpitations. Negative for chest pain and leg swelling. Gastrointestinal: Negative for abdominal pain, constipation, diarrhea, nausea and vomiting. Genitourinary: Negative for dysuria, frequency and urgency. Musculoskeletal: Negative for arthralgias, back pain, myalgias and neck pain. Skin: Negative for rash. Neurological: Negative for dizziness, syncope, weakness, numbness and headaches. Psychiatric/Behavioral: Negative for confusion and self-injury. Physical Exam  Vitals signs and nursing note reviewed. Constitutional:       General: He is not in acute distress. Appearance: He is not ill-appearing or toxic-appearing. HENT:      Head: Normocephalic and atraumatic. Nose: Nose normal.      Mouth/Throat:      Mouth: Mucous membranes are moist.      Pharynx: Oropharynx is clear. Eyes:      Extraocular Movements: Extraocular movements intact. Conjunctiva/sclera: Conjunctivae normal.      Pupils: Pupils are equal, round, and reactive to light.    Neck:      Musculoskeletal: Normal range of motion and neck supple. Cardiovascular:      Rate and Rhythm: Normal rate and regular rhythm. Pulses: Normal pulses. Radial pulses are 2+ on the right side and 2+ on the left side. Heart sounds: Normal heart sounds. Pulmonary:      Effort: Pulmonary effort is normal.      Breath sounds: Normal breath sounds. Abdominal:      General: Abdomen is flat. Bowel sounds are normal. There is no distension. Palpations: Abdomen is soft. Tenderness: There is no abdominal tenderness. Musculoskeletal: Normal range of motion. Right lower leg: No edema. Left lower leg: No edema. Skin:     General: Skin is warm and dry. Capillary Refill: Capillary refill takes less than 2 seconds. Neurological:      Mental Status: He is alert and oriented to person, place, and time. MDM  Number of Diagnoses or Management Options  Defibrillator discharge:   Palpitations:   Ventricular tachycardia [facility]):   Diagnosis management comments: Patient is a 57-year-old male with a history of HFrEF with a outpatient LifeVest who presents to the emergency department after reported LifeVest defibrillation on 8/21 after a very stressful situation with his mother. Patient presents asymptomatic and with his LifeVest in place. Patient presents awake, alert, following all commands, neurologically intact. Sickle exam is unremarkable. EKG was obtained showing sinus mechanism without ectopy. Chest x-ray was obtained without acute pathology. Labs with no significant findings, troponin was negative. Patient was observed in the emergency department without any change. Electrophysiology was consulted and evaluated the patient stating after interrogation, patient had true VT on 8/21 and they would like the patient admitted for AICD placement tomorrow.   Plan was discussed with patient and he initially refused admission stating he would present tomorrow for outpatient placement, then changed his mind and stated that he would be agreeable to admission tonight. Dr. Tato Ahn was consulted in the interim and to admit the patient. Need for LifeVest presented to change out his LifeVest however it was not changed due to patient being on telemetry during his admission. Patient without further change in the emergency department was admitted in stable condition. Amount and/or Complexity of Data Reviewed  Clinical lab tests: ordered and reviewed  Tests in the radiology section of CPT®: ordered and reviewed       ED Course as of Aug 25 1724   Tue Aug 25, 2020   1621 Cardiology was consulted. Will defer to EP.    [QC]   4428 Patient is decided to not be admitted today but will return in the morning. I spoke with patient along with electrophysiology and patient has instructions for outpatient returning in the morning. Patient will hold his Eliquis tonight and tomorrow morning, will be n.p.o. after midnight, will return in the morning for procedure. Patient verbalized instructions and understanding.    [QC]   [id number] Patient has changed his mind. And he would like to be admitted tonight for the procedure tomorrow. [QC]      ED Course User Index  [QC] Dao Elmore MD      ED Course as of Aug 25 1724   Tue Aug 25, 2020   1621 Cardiology was consulted. Will defer to EP.    [QC]   6684 Patient is decided to not be admitted today but will return in the morning. I spoke with patient along with electrophysiology and patient has instructions for outpatient returning in the morning. Patient will hold his Eliquis tonight and tomorrow morning, will be n.p.o. after midnight, will return in the morning for procedure. Patient verbalized instructions and understanding.    [QC]   [id number] Patient has changed his mind. And he would like to be admitted tonight for the procedure tomorrow.     [QC]      ED Course User Index  [QC] Dao Elmore MD       --------------------------------------------- PAST HISTORY ---------------------------------------------  Past Medical History:  has a past medical history of Arthritis, CHF (congestive heart failure) ([facility] Hypertension, New onset atrial flutter ([facility] Preoperative clearance, and Seasonal allergies. Past Surgical History:  has a past surgical history that includes back surgery; Dilatation, esophagus; hernia repair; and hip surgery (09/13/2016). Social History:  reports that he has been smoking cigarettes. He has a 3.75 pack-year smoking history. He uses smokeless tobacco. He reports current alcohol use of about 3.0 standard drinks of alcohol per week. He reports that he does not use drugs. Family History: family history includes Diabetes in his brother; High Blood Pressure in his paternal aunt and paternal uncle. The patients home medications have been reviewed.     Allergies: Ace inhibitors and Lisinopril    -------------------------------------------------- RESULTS -------------------------------------------------    Lab  Results for orders placed or performed during the hospital encounter of 08/25/20   CBC auto differential   Result Value Ref Range    WBC 4.6 4.5 - 11.5 E9/L    RBC 4.10 3.80 - 5.80 E12/L    Hemoglobin 14.2 12.5 - 16.5 g/dL    Hematocrit 42.8 37.0 - 54.0 %    .4 (H) 80.0 - 99.9 fL    MCH 34.6 26.0 - 35.0 pg    MCHC 33.2 32.0 - 34.5 %    RDW 12.9 11.5 - 15.0 fL    Platelets 958 334 - 943 E9/L    MPV 10.8 7.0 - 12.0 fL    Neutrophils % 57.1 43.0 - 80.0 %    Immature Granulocytes % 0.4 0.0 - 5.0 %    Lymphocytes % 26.2 20.0 - 42.0 %    Monocytes % 12.3 (H) 2.0 - 12.0 %    Eosinophils % 2.9 0.0 - 6.0 %    Basophils % 1.1 0.0 - 2.0 %    Neutrophils Absolute 2.60 1.80 - 7.30 E9/L    Immature Granulocytes # 0.02 E9/L    Lymphocytes Absolute 1.19 (L) 1.50 - 4.00 E9/L    Monocytes Absolute 0.56 0.10 - 0.95 E9/L    Eosinophils Absolute 0.13 0.05 - 0.50 E9/L    Basophils Absolute 0.05 0.00 - 0.20 E9/L   Comprehensive Metabolic Panel w/ Reflex to MG   Result Value Ref Range    Sodium 136 132 - 146 mmol/L    Potassium reflex Magnesium 4.2 3.5 - 5.0 mmol/L    Chloride 101 98 - 107 mmol/L    CO2 23 22 - 29 mmol/L    Anion Gap 12 7 - 16 mmol/L    Glucose 104 (H) 74 - 99 mg/dL    BUN 17 6 - 20 mg/dL    CREATININE 1.6 (H) 0.7 - 1.2 mg/dL    GFR Non-African American 54 >=60 mL/min/1.73    GFR African American 54     Calcium 8.8 8.6 - 10.2 mg/dL    Total Protein 6.2 (L) 6.4 - 8.3 g/dL    Alb 3.8 3.5 - 5.2 g/dL    Total Bilirubin 0.9 0.0 - 1.2 mg/dL    Alkaline Phosphatase 46 40 - 129 U/L    ALT 88 (H) 0 - 40 U/L    AST 56 (H) 0 - 39 U/L   Troponin   Result Value Ref Range    Troponin <0.01 0.00 - 0.03 ng/mL   Protime-INR   Result Value Ref Range    Protime 15.4 (H) 9.3 - 12.4 sec    INR 1.4    Brain Natriuretic Peptide   Result Value Ref Range    Pro-BNP 1,593 (H) 0 - 125 pg/mL   Magnesium   Result Value Ref Range    Magnesium 2.1 1.6 - 2.6 mg/dL       Radiology  XR CHEST PORTABLE   Final Result      No airspace opacities or pleural effusion. EKG:  This EKG is signed and interpreted by me. Rate: 69  Rhythm: Sinus  Interpretation: no acute changes  Comparison: stable as compared to patient's most recent EKG      ------------------------- NURSING NOTES AND VITALS REVIEWED ---------------------------  Date / Time Roomed:  8/25/2020  3:13 PM  ED Bed Assignment:  [id number]/18B-[id number]    The nursing notes within the ED encounter and vital signs as below have been reviewed.    Patient Vitals for the past 24 hrs:   BP Temp Pulse Resp SpO2 Weight   08/25/20 1653 128/83 97.9 °F (36.6 °C) 68 15 100 % --   08/25/20 1413 126/89 -- -- 16 -- 225 lb (102.1 kg)   08/25/20 1357 -- 97.5 °F (36.4 °C) 69 -- 96 % --     Oxygen Saturation Interpretation: Normal      ------------------------------------------ PROGRESS NOTES ------------------------------------------  Re-evaluation(s):  Patients symptoms show no change  Repeat physical examination is not changed    I have spoken with the patient and discussed todays results, in addition to providing specific details for the plan of care and counseling regarding the diagnosis and prognosis. Their questions are answered at this time and they are agreeable with the plan.      --------------------------------- ADDITIONAL PROVIDER NOTES ---------------------------------  Consultations:  Spoke with Dr. Jacob Hanna,  They will admit this patient. Spoke with electrophysiology, they will provide consult. This patient's ED course included: a personal history and physicial examination, re-evaluation prior to disposition, cardiac monitoring and continuous pulse oximetry    This patient has remained hemodynamically stable and remained unchanged during their ED course. Please note that the withdrawal or failure to initiate urgent interventions for this patient would likely result in a life threatening deterioration or permanent disability. Clinical Impression  1. Defibrillator discharge    2. Ventricular tachycardia ([address]    3. Palpitations        Disposition  Patient's disposition: Admit to telemetry  Patient's condition is stable    8/25/20, 4:57 PM EDT.     This note is prepared by Luis Chung MD -PGY-[id number]         Luis Chung MD  Resident  08/25/20 1248

## 2020-08-25 NOTE — TELEPHONE ENCOUNTER
Dr. Shila Bingham reviewed recent Life Vest strips in Dr. Park's absence. Strips revealed VF and patient did receive a shock. Per verbal from Dr. Shila Bingham, patient should proceed to [facility] for evaluation and consult with EP. Called patient, goes right to voicemail, no voicemail set up. Spoke with mother Viridiana Trevino). Patient lives about 15 minutes from her, she will go to his house. If she isn't able to reach him, she will call and let me know.

## 2020-08-25 NOTE — ED NOTES
Janet OQUENDO from [facility] called back and asked us to page the Physician on call for EP.       Estrellita Kearney  08/25/20 8630

## 2020-08-25 NOTE — CONSULTS
[address] [facility] Electrophysiology  Consultation Report  PATIENT: Jarred Mi  MEDICAL RECORD NUMBER: [id number]  DATE OF SERVICE:  8/25/2020  ATTENDING ELECTROPHYSIOLOGIST: Chase Lee MD  PRIMARY ELECTROPHYSIOLOGIST: Chase Lee MD  REFERRING PHYSICIAN: Sandra Naranjo MD and Cristi Reyes MD  CHIEF COMPLAINT: [address] discharge     HPI: This is a 64 y.o. male with a history of   Patient Active Problem List   Diagnosis    Primary osteoarthritis of left hip    Essential hypertension    Acute blood loss as cause of postoperative anemia    Osteoarthritis of multiple joints    New onset atrial flutter ([address]    Acute decompensated heart failure ([address]    New onset of congestive heart failure ([address]    Atrial flutter with rapid ventricular response (HCC)    Acidosis    Elevated LFTs    CAD in native artery    Acute diastolic CHF (congestive heart failure) ([address]    Atrial flutter ([address]    Nonischemic cardiomyopathy ([address]    Ventricular tachycardia ([address]   who presents to the hospital complaining of the [address] discharge. Jarred Mi is known to Dr. Debbie Griffith, he was seen in initial consultation on 8/16/2020 at that time he was found to have a nonischemic cardiomyopathy,  Secondary to his Aflutter with RVR. Due to his newly diagnosed myopathy he was discharged with a LIfeVest on 8/21/20. On the 21th he was at his parents house where there was some disagreement over the television at that time his LifeVest reveals that he had at least 40 seconds of ventricular fibrillation at 10:56 AM, the patient reports that he was awake but yet unable to reach the aborted key he received 150 J shock restoring sinus rhythm. The patient did not transmit his LifeVest data until today and was referred to the ER for evaluation. He is somewhat anxious over previous financial matters but at this time has agreed to stay for device implantation.      Initial laboratory evaluation includes creatinine 1.6 please July 8year-old, GFR 54, magnesium 2.1, troponin less than 0.01, proBNP 1593, ALT 88, AST 56 his EKG showed normal sinus rhythm with a rate of 69 bpm QRS 98 MS, QTC 471ms       Cardiac electrophysiology service is consulted for  WCD discarge. Patient Active Problem List    Diagnosis Date Noted    Ventricular tachycardia ([address] 08/25/2020    Atrial flutter (HCC)     Nonischemic cardiomyopathy (HCC)     Acute diastolic CHF (congestive heart failure) ([address] 08/19/2020    CAD in native artery 08/18/2020    Atrial flutter with rapid ventricular response (HCC)     Acidosis     Elevated LFTs     New onset atrial flutter ([address] 08/15/2020    Acute decompensated heart failure ([address] 08/15/2020    New onset of congestive heart failure ([address] 08/15/2020    Essential hypertension 09/15/2016    Acute blood loss as cause of postoperative anemia 09/15/2016    Osteoarthritis of multiple joints 09/15/2016    Primary osteoarthritis of left hip 09/13/2016       Past Medical History:   Diagnosis Date    Arthritis     CHF (congestive heart failure) ([address]     Hypertension     New onset atrial flutter (HCC)     Preoperative clearance 08/18/2016    medical clearance on paper chart from Dr. Chung Alvares. Gerardo    Seasonal allergies        Family History   Problem Relation Age of Onset    Diabetes Brother     High Blood Pressure Paternal Aunt     High Blood Pressure Paternal Uncle        Social History     Tobacco Use    Smoking status: Current Some Day Smoker     Packs/day: 0.25     Years: 15.00     Pack years: 3.75     Types: Cigarettes    Smokeless tobacco: Current User   Substance Use Topics    Alcohol use: Yes     Alcohol/week: 3.0 standard drinks     Types: 3 Shots of liquor per week       No current facility-administered medications for this encounter.       Current Outpatient Medications   Medication Sig Dispense Refill    isosorbide dinitrate (ISORDIL) 20 MG tablet Take 1 Skin: warm, no rashes     I have personally reviewed the laboratory, cardiac diagnostic and radiographic testing as outlined below:    Data:    Recent Labs     08/25/20  1421   WBC 4.6   HGB 14.2   HCT 42.8        Recent Labs     08/25/20  1421      K 4.2      CO2 23   BUN 17   CREATININE 1.6*   CALCIUM 8.8      Lab Results   Component Value Date    MG 2.1 08/25/2020     No results for input(s): TSH in the last 72 hours. Recent Labs     08/25/20  1421   INR 1.4       CXR 08/25/2020: No airspace opacities or pleural effusion. Telemetry 08/25/2020: Sinus rhythm without recurrent VT    EKG 08/25/2020: normal sinus rhythm with a rate of 69 bpm QRS 98 MS, QTC 471ms    Please see scan in Cardiology. Echocardiogram 08/16/2020:   Transthoracic Echocardiography Report (TTE)      Demographics      Patient Name    Nathaniel Adorno Se  Gender            Male      Medical Record  [id number]    Room Number       9726   Number      Account #       [de-identified]   Procedure Date    08/16/2020      Corporate ID                Ordering                               Physician      Accession       [id number]  Referring         Flores Blade   Number                      Physician         Rahel Mcginnis      Date of Birth   1960  Sonographer       Juan Carlos Lackey [facility]      Age             64 year(s)  Interpreting      [address] [place] [address]                               Physician         Physician Cardiology                                                 Susan Soto MD                                  Any Other     Procedure     Type of Study      TTE procedure:Echo Complete W/Doppler & Color Flow.      Procedure Date  Date: 08/16/2020 Start: 07:14 AM     Study Location: Portable  Technical Quality: Adequate visualization     Indications:Congestive heart failure and Atrial fibrillation.     Patient Status: Routine     Height: 72 inches Weight: 238 pounds BSA: 2.29 m^2 BMI: 32.28 kg/m^2     HR: 89 bpm BP: 122/80 mmHg      Findings      Left Ventricle   Left ventricle is moderately enlarged . Ejection fraction is visually estimated at 20-25%. Atrial fibrillation may   affect the evaluation of LV systolic function. Overall ejection fraction severely decreased . Abnormal LV diastolic function with elevated filling pressures (E/e' >11). Right Ventricle   Mildly dilated right ventricle. Right ventricle global systolic function is low normal . TAPSE 17 mm. Left Atrium   The left atrium is severely dilated. Interatrial septum appears intact. No evidence of patent foramen ovale. Right Atrium   Markedly enlarged right atrium size. Mitral Valve   Normal mitral valve structure and function. No evidence of mitral valve stenosis. Moderate to severe mitral regurgitation with centrally directed jet. Tricuspid Valve   The tricuspid valve appears structurally normal.   Moderate tricuspid regurgitation. RVSP is 36 mmHg. Normal estimated PA systolic pressure. Aortic Valve   Structurally normal aortic valve. The aortic valve is trileaflet. No hemodynamically significant aortic stenosis is present. No evidence of aortic valve regurgitation. Pulmonic Valve   Pulmonic valve is structurally normal.   Physiologic and/or trace pulmonic regurgitation present. No evidence of pulmonic valve stenosis. Pericardial Effusion   No evidence for hemodynamically significant pericardial effusion. Pleural Effusion   No evidence of pleural effusion. Aorta   Normal aortic root and ascending aorta. Miscellaneous   Dilated Inferior Vena Cava. Inferior Vena Cava, normal respiratory variation. Conclusions      Summary   Left ventricle is moderately enlarged . Ejection fraction is visually estimated at 20-25%. Atrial fibrillation may   affect the evaluation of LV systolic function. Overall ejection fraction severely decreased .    Abnormal LV diastolic function with elevated filling pressures (E/e' >11). The left atrium is severely dilated. Mildly dilated right ventricle. Right ventricle global systolic function is low normal . TAPSE 17 mm. Moderate to severe mitral regurgitation with centrally directed jet. No hemodynamically significant aortic stenosis is present. Moderate tricuspid regurgitation. RVSP is 36 mmHg. Normal estimated PA systolic pressure. No evidence for hemodynamically significant pericardial effusion. No previous echo for comparison.       Signature      ----------------------------------------------------------------   Electronically signed by Ravi Forde MD(Interpreting   physician) on 08/16/2020 05:23 PM   ----------------------------------------------------------------     M-Mode/2D Measurements & Calculations      LV Diastolic    LV Systolic Dimension: 5.8   AV Cusp Separation: 1.9 cmLA   Dimension: 6.7  cm                           Dimension: 5.4 cmAO Root   cm              LV Volume Diastolic: 035.3   Dimension: 3.2 cm   LV FS:13.4 %    ml   LV PW           LV Volume Systolic: 840.8 ml   Diastolic: 0.9  LV EDV/LV EDV Index: 233.6   cm              ml/102 ml/m^2LV ESV/LV ESV   RV Diastolic Dimension: 3.6   LV PW Systolic: Index: 229.2 AU/59QT/ m^2    cm   1.2 cm          EF Calculated: 29.4 %   Septum          LV Mass Index: 114 l/min*m^2 LA/Aorta: 9.16   Diastolic: 0.9  LV Length: 9.1 cm            Ascending Aorta: 3.5 cm   cm                                           LA volume/Index: 182.3 ml   Septum          LVOT: 2.1 cm                 /22HJ/Y^1   Systolic: 1.1                                RA Area: 30.6 cm^2   cm   CO: 3.88 l/min                               IVC Expiration: 3.1 cm   CI: 1.69   l/m*m^2   LV Mass: 261.32   g     Doppler Measurements & Calculations      MV Peak E-Wave: 1.23 m/s   AV Peak Velocity:    LVOT Peak Velocity: 0.72                              1.16 m/s             m/s   MV Peak Gradient: 6 mmHg   AV Peak Gradient: LVOT Mean Velocity: 0.5   MV Mean Gradient: 2.7 mmHg 5.34 mmHg            m/s   MV Mean Velocity: 0.75 m/s AV Mean Velocity:    LVOT Peak Gradient: 2.2   MV Deceleration Time:      0.76 m/s             mmHgLVOT Mean Gradient:   134.6 msec                 AV Mean Gradient:    1.2 mmHg   MV P1/2t: 65.5 msec        2.8 mmHg             Estimated RVSP: 36 mmHg   MVA by PHT:3.36 cm^2       AV VTI: 16.9 cm      Estimated RAP:8 mmHg   MV Area (continuity): 1.9  AV Area   cm^2                       (Continuity):2.58                              cm^2                 TR Velocity:2.64 m/s                                                   TR Gradient:27.77 mmHg   MR Velocity: 4.49 m/s      LVOT VTI: 12.6 cm    PV Peak Velocity: 0.55 m/s   MV DENNY PISA: 0.29 cm^2     IVRT: 62.3 msec      PV Peak Gradient: 1.21   MR VTI: 125.9 cm           Estimated PASP:      mmHg   Alias Velocity: 0.21       35.77 mmHg           PV Mean Velocity: 0.39 m/s   m/sPISA Radius: 1 cm                            PV Mean Gradient: 0.7 mmHg      PISA area: 6.28 cm^2MR                          WA ED Velocity: 1.53 m/s   flow rate: 128.74 ml/sMR   volume:36.51 ml     [id number]    Cardiac Catheterization 2020:   [address]                  [address] [facility],  [facility]                             CARDIAC CATHETERIZATION     PATIENT NAME: Rudy Olsen                         :        1960  MED REC NO:   [id number]                            ROOM:  ACCOUNT NO:   [de-identified]                           ADMIT DATE: 2020  PROVIDER:     Cassidy Peraza MD     DATE OF PROCEDURE:  2020     LEFT HEART CATHETERIZATION     INDICATION:  New onset CHF and newly diagnosed cardiomyopathy with severe  systolic dysfunction.     PROCEDURE NOTE:  The patient was transferred [facility]  today to undergo left heart catheterization due to newly diagnosed CHF  and cardiomyopathy with severe systolic dysfunction. The patient was  brought to the cardiac cath lab in his usual fasting state. Under  sterile condition and under local anesthetic and using conscious  sedation, a 6-Greek Terumo slender sheath was inserted into the right  radial artery. The patient received 5000 units of intravenous heparin. He also received 300 mcg of intraarterial nitroglycerin via the right  radial sheath. Then I could not advance a J-tip wire across the distal  radial artery. The patient received 2.5 mg of diluted verapamil and 200  mcg of intra-arterial nitroglycerin via the right radial sheath. Then,  a Wholey wire was advanced to the ascending aorta with little difficulty  due to angulated right subclavian artery. Then a 5-Greek TIG  diagnostic catheter was advanced to the ascending aorta and the left  main coronary artery was engaged and a coronary angiogram was done. This catheter was used to engage the right coronary artery and a  coronary angiogram was done. This catheter was exchanged over the  guidewire to a 5-Greek pigtail which was advanced into the left  ventricle without difficulty. The left ventricular end-diastolic  pressure was measured. No left ventriculogram was done due to elevated  creatinine and due to known ejection fraction by echocardiogram.  At the  end of the procedure, the pigtail was pulled out. The Terumo slender  sheath was pulled out and a Vasc Band was applied over the right radial  artery with good hemostasis. The patient tolerated the procedure very  well and no complications were noted. No significant blood loss  occurred during the procedure.     FINDINGS:  HEMODYNAMIC RESULTS:  1. Aortic pressure 134/78 mmHg.   2.  Left ventricular end-diastolic pressure 23 mmHg.     CORONARY ANATOMY:  Left main:  It is a long and large artery with no angiographic stenosis  noted. LAD:  It is a large artery wrapping around the apex and giving rise to  two diagonal branches and a large first septal  and smaller  septal branches. The first diagonal was small. The second diagonal was  large and bifurcating. No angiographic stenosis noted in the LAD or its  branches. Left circumflex: It is a large artery giving rise to three obtuse  marginal branches. The first and second obtuse marginal branches were  very small. The third obtuse marginal branch was bifurcating and  tortuous. No angiographic stenosis noted. RCA:  It is a large dominant artery giving rise to a conus branch, SA  woody or AV woody branch, an RV marginal branch, a large PDA and a large  PLV branch. There was questionable 50% discrete distal RCA stenosis  versus most likely overlap of the proximal segment of the PDA and of  the PLV branch.     IMPRESSION:  1. Questionable 50% discrete distal RCA stenosis versus most likely  overlap of the proximal segment of PLV and PDA. 2.  Absence of angiographic stenosis in the left main, LAD or left  circumflex. 3.  Elevated LVEDP at 23 mmHg.     RECOMMENDATIONS:  1. GDMT. 2.  Aggressive medical therapy.     PROCEDURE START TIME:  17:22 p.m.     PROCEDURE END TIME:  17:43 p.m.     CONTRAST VOLUME:  41 mL of Optiray.     FLUOROSCOPY TIME:  3.1 minutes.     CONSCIOUS SEDATION:  2 mg of Versed and 100 mcg of intravenous fentanyl.           Yasemin Solis MD     D: 08/18/2020 18:12:21       T: 08/18/2020 18:17:41     [email or web address]  Job#: [id number]     Doc#: [id number]        I have independently reviewed all of the ECGs and rhythm strips per above     Assessment/Plan:  This is a 64 y.o. male with a history of   Patient Active Problem List   Diagnosis    Primary osteoarthritis of left hip    Essential hypertension    Acute blood loss as cause of postoperative anemia    Osteoarthritis of multiple joints    New onset atrial flutter (HCC)    Acute decompensated heart failure ([facility]    New onset of congestive heart failure (HCC)    Atrial flutter with rapid ventricular response (HCC)    Acidosis    Elevated LFTs    CAD in native artery    Acute diastolic CHF (congestive heart failure) (HCC)    Atrial flutter (HCC)    Nonischemic cardiomyopathy ([facility]    [facility] ([facility]    who presents with WCD discharge . 1. Sustained VT   - 08/21/20 greater than 30 sec. Of VT treated with WCD shock.   - WCD prescribed 08/20/20    2. HFrEF   - First diagnosed 08/16/2020  -Nonischemic, negative cath 8/20/2020  - LVEF 20-25% 08/16/20  - Compensated   - GDMT:  Isordil, hydralazine, Toprol   - Diuretic Lasix 40     3. Atrial flutter   - First seen 08/16/2020  - [id number] 3  (CHF, HTN, CAD)  - Eliquis 5mg BID    - DCCV: 08/19/2020  - Amiodarone started 08/20/2020  - Rate control Toprol 25 mg daily       4. Valvular heart disease   - Moderate MT mild TR    5. CKD   Creat 1.3    6. Hypertension     7. angioedema with ACE inhibitor    8. Elevated LFT's     9. Tobacco abuse. Recommendations to follow per Dr. Leslie Addison. Thank you for allowing me to participate in your patient's care. Please call me if there are any questions or concerns. Discussed with Dr. Leslie Addison. Hermelindo Staff, APRN - CNP  Cardiac Electrophysiology  [facility]) Physicians  [facility] [place]: [facility]  5:20 PM  8/25/2020    ______________________________________________________________________    I have personally seen and evaluated the patient. I personally obtained the history and performed the physical exam. I personally reviewed all of the above labs and data. All of the assessments and recommendations are from me. I have reviewed the above documentation completed by the ABI. Please see my additional contributions to the HPI, physical exam, and assessment, and recommendations below. History of Present Illness:   The patient is a 64year-old muscles  Abdomen: soft, non-tender, bowel sounds present, no masses or hepatomegaly   Musculoskeletal: no digital clubbing, no edema   Skin: warm, no rashes     EKG 8/25/20: NSR 69, old AMI, QTc 471 msec    Telemetry 8/25/20: NSR, no VT    Echocardiogram 08/16/20:    Findings      Left Ventricle   Left ventricle is moderately enlarged . Ejection fraction is visually estimated at 20-25%. Atrial fibrillation may   affect the evaluation of LV systolic function. Overall ejection fraction severely decreased . Abnormal LV diastolic function with elevated filling pressures (E/e' >11). Right Ventricle   Mildly dilated right ventricle. Right ventricle global systolic function is low normal . TAPSE 17 mm. Left Atrium   The left atrium is severely dilated. Interatrial septum appears intact. No evidence of patent foramen ovale. Right Atrium   Markedly enlarged right atrium size. Mitral Valve   Normal mitral valve structure and function. No evidence of mitral valve stenosis. Moderate to severe mitral regurgitation with centrally directed jet. Tricuspid Valve   The tricuspid valve appears structurally normal.   Moderate tricuspid regurgitation. RVSP is 36 mmHg. Normal estimated PA systolic pressure. Aortic Valve   Structurally normal aortic valve. The aortic valve is trileaflet. No hemodynamically significant aortic stenosis is present. No evidence of aortic valve regurgitation. Pulmonic Valve   Pulmonic valve is structurally normal.   Physiologic and/or trace pulmonic regurgitation present. No evidence of pulmonic valve stenosis. Pericardial Effusion   No evidence for hemodynamically significant pericardial effusion. Pleural Effusion   No evidence of pleural effusion. Aorta   Normal aortic root and ascending aorta. Miscellaneous   Dilated Inferior Vena Cava. Inferior Vena Cava, normal respiratory variation.       Conclusions      Summary   Left ventricle is moderately enlarged . Ejection fraction is visually estimated at 20-25%. Atrial fibrillation may   affect the evaluation of LV systolic function. Overall ejection fraction severely decreased . Abnormal LV diastolic function with elevated filling pressures (E/e' >11). The left atrium is severely dilated. Mildly dilated right ventricle. Right ventricle global systolic function is low normal . TAPSE 17 mm. Moderate to severe mitral regurgitation with centrally directed jet. No hemodynamically significant aortic stenosis is present. Moderate tricuspid regurgitation. RVSP is 36 mmHg. Normal estimated PA systolic pressure. No evidence for hemodynamically significant pericardial effusion. No previous echo for comparison.       Signature      ----------------------------------------------------------------   Electronically signed by Alfa Fitzpatrick MD(Interpreting   physician) on 08/16/2020 05:23 PM   ----------------------------------------------------------------     M-Mode/2D Measurements & Calculations      LV Diastolic    LV Systolic Dimension: 5.8   AV Cusp Separation: 1.9 cmLA   Dimension: 6.7  cm                           Dimension: 5.4 cmAO Root   cm              LV Volume Diastolic: 431.2   Dimension: 3.2 cm   LV FS:13.4 %    ml   LV PW           LV Volume Systolic: 110.5 ml   Diastolic: 0.9  LV EDV/LV EDV Index: 233.6   cm              ml/102 ml/m^2LV ESV/LV ESV   RV Diastolic Dimension: 3.6   LV PW Systolic: Index: 846.7 UU/49QB/ m^2    cm   1.2 cm          EF Calculated: 29.4 %   Septum          LV Mass Index: 114 l/min*m^2 LA/Aorta: 8.56   Diastolic: 0.9  LV Length: 9.1 cm            Ascending Aorta: 3.5 cm   cm                                           LA volume/Index: 182.3 ml   Septum          LVOT: 2.1 cm                 /02ZV/F^5   Systolic: 1.1                                RA Area: 30.6 cm^2   cm   CO: 3.88 l/min                               IVC Expiration: 3.1 cm CI: 1.69   l/m*m^2   LV Mass: 261.32   g     Doppler Measurements & Calculations      MV Peak E-Wave: 1.23 m/s   AV Peak Velocity:    LVOT Peak Velocity: 0.72                              1.16 m/s             m/s   MV Peak Gradient: 6 mmHg   AV Peak Gradient:    LVOT Mean Velocity: 0.5   MV Mean Gradient: 2.7 mmHg 5.34 mmHg            m/s   MV Mean Velocity: 0.75 m/s AV Mean Velocity:    LVOT Peak Gradient: 2.2   MV Deceleration Time:      0.76 m/s             mmHgLVOT Mean Gradient:   134.6 msec                 AV Mean Gradient:    1.2 mmHg   MV P1/2t: 65.5 msec        2.8 mmHg             Estimated RVSP: 36 mmHg   MVA by PHT:3.36 cm^2       AV VTI: 16.9 cm      Estimated RAP:8 mmHg   MV Area (continuity): 1.9  AV Area   cm^2                       (Continuity):2.58                              cm^2                 TR Velocity:2.64 m/s                                                   TR Gradient:27.77 mmHg   MR Velocity: 4.49 m/s      LVOT VTI: 12.6 cm    PV Peak Velocity: 0.55 m/s   MV DENNY PISA: 0.29 cm^2     IVRT: 62.3 msec      PV Peak Gradient: 1.21   MR VTI: 125.9 cm           Estimated PASP:      mmHg   Alias Velocity: 0.21       35.77 mmHg           PV Mean Velocity: 0.39 m/s   m/sPISA Radius: 1 cm                            PV Mean Gradient: 0.7 mmHg      PISA area: 6.28 cm^2MR                          VT ED Velocity: 1.53 m/s   flow rate: 128.74 ml/sMR   volume:36.51 ml     Cardiac Catheterization 2020:   [address]                  [address] [facility], [address] [place] [address]                             CARDIAC CATHETERIZATION     PATIENT NAME: Lolita Goldberg                         :        1960  MED REC NO:   [id number]                            ROOM:  ACCOUNT NO:   [de-identified]                           ADMIT DATE: 2020  PROVIDER:     Karri Renteria MD     DATE OF PROCEDURE:  2020     LEFT HEART CATHETERIZATION     INDICATION:  New onset CHF and newly diagnosed cardiomyopathy with severe  systolic dysfunction.     PROCEDURE NOTE:  The patient was transferred from [facility]  today to undergo left heart catheterization due to newly diagnosed CHF  and cardiomyopathy with severe systolic dysfunction. The patient was  brought to the cardiac cath lab in his usual fasting state. Under  sterile condition and under local anesthetic and using conscious  sedation, a 6-Fijian Terumo slender sheath was inserted into the right  radial artery. The patient received 5000 units of intravenous heparin. He also received 300 mcg of intraarterial nitroglycerin via the right  radial sheath. Then I could not advance a J-tip wire across the distal  radial artery. The patient received 2.5 mg of diluted verapamil and 200  mcg of intra-arterial nitroglycerin via the right radial sheath. Then,  a Wholey wire was advanced to the ascending aorta with little difficulty  due to angulated right subclavian artery. Then a 5-Fijian TIG  diagnostic catheter was advanced to the ascending aorta and the left  main coronary artery was engaged and a coronary angiogram was done. This catheter was used to engage the right coronary artery and a  coronary angiogram was done. This catheter was exchanged over the  guidewire to a 5-Fijian pigtail which was advanced into the left  ventricle without difficulty. The left ventricular end-diastolic  pressure was measured. No left ventriculogram was done due to elevated  creatinine and due to known ejection fraction by echocardiogram.  At the  end of the procedure, the pigtail was pulled out. The Terumo slender  sheath was pulled out and a Vasc Band was applied over the right radial  artery with good hemostasis. The patient tolerated the procedure very  well and no complications were noted. No significant blood loss  occurred during the procedure.     FINDINGS:  HEMODYNAMIC RESULTS:  1. Aortic pressure 134/78 mmHg.   2.  Left ventricular end-diastolic pressure 23 mmHg.     CORONARY ANATOMY:  Left main:  It is a long and large artery with no angiographic stenosis  noted. LAD:  It is a large artery wrapping around the apex and giving rise to  two diagonal branches and a large first septal  and smaller  septal branches. The first diagonal was small. The second diagonal was  large and bifurcating. No angiographic stenosis noted in the LAD or its  branches. Left circumflex: It is a large artery giving rise to three obtuse  marginal branches. The first and second obtuse marginal branches were  very small. The third obtuse marginal branch was bifurcating and  tortuous. No angiographic stenosis noted. RCA:  It is a large dominant artery giving rise to a conus branch, SA  woody or AV woody branch, an RV marginal branch, a large PDA and a large  PLV branch. There was questionable 50% discrete distal RCA stenosis  versus most likely overlap of the proximal segment of the PDA and of  the PLV branch.     IMPRESSION:  1. Questionable 50% discrete distal RCA stenosis versus most likely  overlap of the proximal segment of PLV and PDA. 2.  Absence of angiographic stenosis in the left main, LAD or left  circumflex. 3.  Elevated LVEDP at 23 mmHg.     RECOMMENDATIONS:  1. GDMT. 2.  Aggressive medical therapy.     PROCEDURE START TIME:  17:22 p.m.     PROCEDURE END TIME:  17:43 p.m.     CONTRAST VOLUME:  41 mL of Optiray.     FLUOROSCOPY TIME:  3.1 minutes.     CONSCIOUS SEDATION:  2 mg of Versed and 100 mcg of intravenous fentanyl.           Hugo Polanco MD     LifeVest 8/21/20:      Assessment/Plan:    1. Sustained ventricular tachycardia  - Polymorphic ventricular tachycardia and ventricular fibrillation on 08/21/20 greater than 30 sec required WCD shock.   - No recurrent VT since. - He would benefit from ICD implantation for secondary prophylaxis of SCD.   - The risks, benefits, and alternatives to ICD implantation and possible defibrillation threshold testing were discussed with the patient. These risks include but are not limited to bleeding, infection, blood clot, pneumothorax, hemothorax, cardiac valve damage, cardiac perforation and tamponade required emergent thoracotomy, contrast induced nephropathy leading to short or even long term dialysis, vascular injury requiring emergent surgical repair, lead dislodgement required reposition, stroke and even death. The patient understands these risks and wishes to proceed with ICD implantation and possible defibrillation threshold testing. 2.  HFrEF   - Nonischemic cardiomyopathy.  - First diagnosed 08/16/2020  - Cardiac cath 8/20/2020 showed nonobstructive CAD. - LVEF 20-25% on TTE 08/16/20  - Compensated and euvolemic  - On GDMT:  Isordil, hydralazine, Toprol and Lasix. - Angioedema with ACE-I    3. Atrial flutter   - First seen 08/16/2020  - [id number] 3  (CHF, HTN, CAD)  - On Eliquis for stroke risk reduction  - Status post DC-CV on 08/19/2020  - Started on Amiodarone on 08/20/2020  - On Toprol XL for rate control. 4. Valvular heart disease   - Moderate to severe MR and moderate TR on TTE 8/16/20    5. CKD   Estimated Creatinine Clearance: 62 mL/min (A) (based on SCr of 1.6 mg/dL (H)). 6. Hypertension   - On  Isordil, hydralazine, Toprol and Lasix. 7. Coronary artery disease  - 50% discrete distal RCA stenosis versus most likely  overlap of the proximal segment of PLV and PDA. 8. Elevated LFT's     9. Tobacco abuse. Recommendations:  1. Dual chamber ICD implantation for secondary prophylaxis of SCD. 2. NPO after midnight. 3. Hold Eliquis. I have spent a total of 110 minutes with the patient and the family reviewing the above stated recommendations. And a total of >50% of that time involved face-to-face time providing counseling and or coordination of care with the other providers. Thank you for allowing me to participate in your patient's care.   Please call me if there are any questions or concerns.      Марина Damon MD  Cardiac Electrophysiology  [facility]  [facility] [place]: [facility]  7:04 PM  8/25/2020

## 2020-08-25 NOTE — ED TRIAGE NOTES
FIRST PROVIDER CONTACT ASSESSMENT NOTE      Department of Emergency Medicine   8/25/20  2:11 PM EDT    Chief Complaint: AICD Problem (pt states last week his defibrillator fired and states that he was not wearing his life vest at time. states that dr tamez told him to come to the er. )      History of Present Illness:    Concha Victoria is a 64 y.o. male who presents to the ED by private car for defibrillator fired last week. Cardiologist said to come to ED. Focused Screening Exam:  Constitutional:  Alert, appears stated age and is in no distress.       *ALLERGIES*     Ace inhibitors and Lisinopril     ED Triage Vitals [08/25/20 1357]   BP Temp Temp src Pulse Resp SpO2 Height Weight   -- 97.5 °F (36.4 °C) -- 69 -- 96 % -- --        Initial Plan of Care:  Initiate Treatment-Testing, Proceed toTreatment Area When Bed Available for ED Attending/MLP to Continue Care    -----------------END OF FIRST PROVIDER CONTACT ASSESSMENT NOTE--------------  Electronically signed by Gilford Marin, PA-C   DD: 8/25/20

## 2020-08-26 ENCOUNTER — ANESTHESIA (OUTPATIENT)
Dept: CARDIAC CATH/INVASIVE PROCEDURES | Age: 60
DRG: 227 | End: 2020-08-26
Payer: OTHER GOVERNMENT

## 2020-08-26 ENCOUNTER — APPOINTMENT (OUTPATIENT)
Dept: GENERAL RADIOLOGY | Age: 60
DRG: 227 | End: 2020-08-26
Payer: OTHER GOVERNMENT

## 2020-08-26 VITALS — OXYGEN SATURATION: 93 % | DIASTOLIC BLOOD PRESSURE: 71 MMHG | SYSTOLIC BLOOD PRESSURE: 103 MMHG

## 2020-08-26 LAB
ANION GAP SERPL CALCULATED.3IONS-SCNC: 13 MMOL/L (ref 7–16)
BUN BLDV-MCNC: 18 MG/DL (ref 6–20)
CALCIUM SERPL-MCNC: 8.9 MG/DL (ref 8.6–10.2)
CHLORIDE BLD-SCNC: 99 MMOL/L (ref 98–107)
CO2: 26 MMOL/L (ref 22–29)
CREAT SERPL-MCNC: 1.7 MG/DL (ref 0.7–1.2)
EKG ATRIAL RATE: 69 BPM
EKG P AXIS: 56 DEGREES
EKG P-R INTERVAL: 180 MS
EKG Q-T INTERVAL: 440 MS
EKG QRS DURATION: 90 MS
EKG QTC CALCULATION (BAZETT): 471 MS
EKG R AXIS: 90 DEGREES
EKG T AXIS: -19 DEGREES
EKG VENTRICULAR RATE: 69 BPM
GFR AFRICAN AMERICAN: 50
GFR NON-AFRICAN AMERICAN: 50 ML/MIN/1.73
GLUCOSE BLD-MCNC: 93 MG/DL (ref 74–99)
HCT VFR BLD CALC: 41.9 % (ref 37–54)
HEMOGLOBIN: 13.7 G/DL (ref 12.5–16.5)
MAGNESIUM: 2.1 MG/DL (ref 1.6–2.6)
MCH RBC QN AUTO: 34.1 PG (ref 26–35)
MCHC RBC AUTO-ENTMCNC: 32.7 % (ref 32–34.5)
MCV RBC AUTO: 104.2 FL (ref 80–99.9)
PDW BLD-RTO: 13 FL (ref 11.5–15)
PLATELET # BLD: 258 E9/L (ref 130–450)
PMV BLD AUTO: 11.3 FL (ref 7–12)
POTASSIUM REFLEX MAGNESIUM: 4.2 MMOL/L (ref 3.5–5)
POTASSIUM SERPL-SCNC: 4.2 MMOL/L (ref 3.5–5)
RBC # BLD: 4.02 E12/L (ref 3.8–5.8)
SODIUM BLD-SCNC: 138 MMOL/L (ref 132–146)
WBC # BLD: 3.4 E9/L (ref 4.5–11.5)

## 2020-08-26 PROCEDURE — 3700000000 HC ANESTHESIA ATTENDED CARE

## 2020-08-26 PROCEDURE — 6370000000 HC RX 637 (ALT 250 FOR IP): Performed by: INTERNAL MEDICINE

## 2020-08-26 PROCEDURE — 6360000002 HC RX W HCPCS: Performed by: INTERNAL MEDICINE

## 2020-08-26 PROCEDURE — 33249 INSJ/RPLCMT DEFIB W/LEAD(S): CPT | Performed by: INTERNAL MEDICINE

## 2020-08-26 PROCEDURE — 71045 X-RAY EXAM CHEST 1 VIEW: CPT

## 2020-08-26 PROCEDURE — 2580000003 HC RX 258: Performed by: NURSE ANESTHETIST, CERTIFIED REGISTERED

## 2020-08-26 PROCEDURE — 3E0102A INTRODUCTION OF ANTI-INFECTIVE ENVELOPE INTO SUBCUTANEOUS TISSUE, OPEN APPROACH: ICD-10-PCS | Performed by: INTERNAL MEDICINE

## 2020-08-26 PROCEDURE — 6360000002 HC RX W HCPCS

## 2020-08-26 PROCEDURE — 83735 ASSAY OF MAGNESIUM: CPT

## 2020-08-26 PROCEDURE — 0JH608Z INSERTION OF DEFIBRILLATOR GENERATOR INTO CHEST SUBCUTANEOUS TISSUE AND FASCIA, OPEN APPROACH: ICD-10-PCS | Performed by: INTERNAL MEDICINE

## 2020-08-26 PROCEDURE — 2580000003 HC RX 258: Performed by: INTERNAL MEDICINE

## 2020-08-26 PROCEDURE — C1898 LEAD, PMKR, OTHER THAN TRANS: HCPCS

## 2020-08-26 PROCEDURE — 2500000003 HC RX 250 WO HCPCS

## 2020-08-26 PROCEDURE — 80048 BASIC METABOLIC PNL TOTAL CA: CPT

## 2020-08-26 PROCEDURE — 2580000003 HC RX 258

## 2020-08-26 PROCEDURE — C1894 INTRO/SHEATH, NON-LASER: HCPCS

## 2020-08-26 PROCEDURE — 2709999900 HC NON-CHARGEABLE SUPPLY

## 2020-08-26 PROCEDURE — 2140000000 HC CCU INTERMEDIATE R&B

## 2020-08-26 PROCEDURE — B51N1ZZ FLUOROSCOPY OF LEFT UPPER EXTREMITY VEINS USING LOW OSMOLAR CONTRAST: ICD-10-PCS | Performed by: INTERNAL MEDICINE

## 2020-08-26 PROCEDURE — 02HK3KZ INSERTION OF DEFIBRILLATOR LEAD INTO RIGHT VENTRICLE, PERCUTANEOUS APPROACH: ICD-10-PCS | Performed by: INTERNAL MEDICINE

## 2020-08-26 PROCEDURE — 02H63KZ INSERTION OF DEFIBRILLATOR LEAD INTO RIGHT ATRIUM, PERCUTANEOUS APPROACH: ICD-10-PCS | Performed by: INTERNAL MEDICINE

## 2020-08-26 PROCEDURE — 6360000002 HC RX W HCPCS: Performed by: NURSE ANESTHETIST, CERTIFIED REGISTERED

## 2020-08-26 PROCEDURE — 3700000001 HC ADD 15 MINUTES (ANESTHESIA)

## 2020-08-26 PROCEDURE — 36415 COLL VENOUS BLD VENIPUNCTURE: CPT

## 2020-08-26 PROCEDURE — C1721 AICD, DUAL CHAMBER: HCPCS

## 2020-08-26 PROCEDURE — 2780000010 HC IMPLANT OTHER

## 2020-08-26 PROCEDURE — 93010 ELECTROCARDIOGRAM REPORT: CPT | Performed by: INTERNAL MEDICINE

## 2020-08-26 PROCEDURE — 85027 COMPLETE CBC AUTOMATED: CPT

## 2020-08-26 PROCEDURE — C1777 LEAD, AICD, ENDO SINGLE COIL: HCPCS

## 2020-08-26 RX ORDER — SODIUM CHLORIDE 9 MG/ML
INJECTION, SOLUTION INTRAVENOUS CONTINUOUS PRN
Status: DISCONTINUED | OUTPATIENT
Start: 2020-08-26 | End: 2020-08-26 | Stop reason: SDUPTHER

## 2020-08-26 RX ORDER — VANCOMYCIN HYDROCHLORIDE 1 G/20ML
INJECTION, POWDER, LYOPHILIZED, FOR SOLUTION INTRAVENOUS PRN
Status: DISCONTINUED | OUTPATIENT
Start: 2020-08-26 | End: 2020-08-26 | Stop reason: SDUPTHER

## 2020-08-26 RX ORDER — MIDAZOLAM HYDROCHLORIDE 1 MG/ML
INJECTION INTRAMUSCULAR; INTRAVENOUS PRN
Status: DISCONTINUED | OUTPATIENT
Start: 2020-08-26 | End: 2020-08-26 | Stop reason: SDUPTHER

## 2020-08-26 RX ORDER — DEXTROSE MONOHYDRATE 50 MG/ML
INJECTION, SOLUTION INTRAVENOUS CONTINUOUS PRN
Status: DISCONTINUED | OUTPATIENT
Start: 2020-08-26 | End: 2020-08-26 | Stop reason: SDUPTHER

## 2020-08-26 RX ORDER — ONDANSETRON 2 MG/ML
4 INJECTION INTRAMUSCULAR; INTRAVENOUS EVERY 6 HOURS PRN
Status: DISCONTINUED | OUTPATIENT
Start: 2020-08-26 | End: 2020-08-27 | Stop reason: HOSPADM

## 2020-08-26 RX ORDER — FENTANYL CITRATE 50 UG/ML
INJECTION, SOLUTION INTRAMUSCULAR; INTRAVENOUS PRN
Status: DISCONTINUED | OUTPATIENT
Start: 2020-08-26 | End: 2020-08-26 | Stop reason: SDUPTHER

## 2020-08-26 RX ORDER — PROPOFOL 10 MG/ML
INJECTION, EMULSION INTRAVENOUS CONTINUOUS PRN
Status: DISCONTINUED | OUTPATIENT
Start: 2020-08-26 | End: 2020-08-26 | Stop reason: SDUPTHER

## 2020-08-26 RX ORDER — SODIUM CHLORIDE 0.9 % (FLUSH) 0.9 %
10 SYRINGE (ML) INJECTION EVERY 12 HOURS SCHEDULED
Status: DISCONTINUED | OUTPATIENT
Start: 2020-08-26 | End: 2020-08-27 | Stop reason: HOSPADM

## 2020-08-26 RX ORDER — SODIUM CHLORIDE 0.9 % (FLUSH) 0.9 %
10 SYRINGE (ML) INJECTION PRN
Status: DISCONTINUED | OUTPATIENT
Start: 2020-08-26 | End: 2020-08-27 | Stop reason: HOSPADM

## 2020-08-26 RX ORDER — PROPOFOL 10 MG/ML
INJECTION, EMULSION INTRAVENOUS PRN
Status: DISCONTINUED | OUTPATIENT
Start: 2020-08-26 | End: 2020-08-26 | Stop reason: SDUPTHER

## 2020-08-26 RX ADMIN — FUROSEMIDE 40 MG: 40 TABLET ORAL at 12:10

## 2020-08-26 RX ADMIN — ACETAMINOPHEN 650 MG: 325 TABLET, FILM COATED ORAL at 20:33

## 2020-08-26 RX ADMIN — MIDAZOLAM 2 MG: 1 INJECTION INTRAMUSCULAR; INTRAVENOUS at 08:55

## 2020-08-26 RX ADMIN — VANCOMYCIN HYDROCHLORIDE 1000 MG: 1 INJECTION, POWDER, LYOPHILIZED, FOR SOLUTION INTRAVENOUS at 09:00

## 2020-08-26 RX ADMIN — HYDRALAZINE HYDROCHLORIDE 25 MG: 25 TABLET, FILM COATED ORAL at 15:27

## 2020-08-26 RX ADMIN — ASPIRIN 81 MG CHEWABLE TABLET 81 MG: 81 TABLET CHEWABLE at 12:10

## 2020-08-26 RX ADMIN — VANCOMYCIN HYDROCHLORIDE 1.5 G: 10 INJECTION, POWDER, LYOPHILIZED, FOR SOLUTION INTRAVENOUS at 20:33

## 2020-08-26 RX ADMIN — PROPOFOL 25 MCG/KG/MIN: 10 INJECTION, EMULSION INTRAVENOUS at 09:33

## 2020-08-26 RX ADMIN — MIDAZOLAM 1 MG: 1 INJECTION INTRAMUSCULAR; INTRAVENOUS at 09:31

## 2020-08-26 RX ADMIN — METOPROLOL SUCCINATE 75 MG: 25 TABLET, EXTENDED RELEASE ORAL at 12:10

## 2020-08-26 RX ADMIN — FENTANYL CITRATE 50 MCG: 50 INJECTION, SOLUTION INTRAMUSCULAR; INTRAVENOUS at 09:00

## 2020-08-26 RX ADMIN — FENTANYL CITRATE 50 MCG: 50 INJECTION, SOLUTION INTRAMUSCULAR; INTRAVENOUS at 09:31

## 2020-08-26 RX ADMIN — HYDRALAZINE HYDROCHLORIDE 25 MG: 25 TABLET, FILM COATED ORAL at 21:40

## 2020-08-26 RX ADMIN — ISOSORBIDE DINITRATE 20 MG: 10 TABLET ORAL at 20:39

## 2020-08-26 RX ADMIN — PROPOFOL 50 MG: 10 INJECTION, EMULSION INTRAVENOUS at 09:37

## 2020-08-26 RX ADMIN — MIDAZOLAM 1 MG: 1 INJECTION INTRAMUSCULAR; INTRAVENOUS at 10:34

## 2020-08-26 RX ADMIN — SODIUM CHLORIDE: 9 INJECTION, SOLUTION INTRAVENOUS at 08:49

## 2020-08-26 RX ADMIN — AMIODARONE HYDROCHLORIDE 400 MG: 200 TABLET ORAL at 12:10

## 2020-08-26 RX ADMIN — DEXTROSE MONOHYDRATE: 50 INJECTION, SOLUTION INTRAVENOUS at 09:00

## 2020-08-26 RX ADMIN — SODIUM CHLORIDE, PRESERVATIVE FREE 10 ML: 5 INJECTION INTRAVENOUS at 20:35

## 2020-08-26 RX ADMIN — FENTANYL CITRATE 50 MCG: 50 INJECTION, SOLUTION INTRAMUSCULAR; INTRAVENOUS at 09:10

## 2020-08-26 RX ADMIN — AMIODARONE HYDROCHLORIDE 400 MG: 200 TABLET ORAL at 20:33

## 2020-08-26 RX ADMIN — PROPOFOL 50 MG: 10 INJECTION, EMULSION INTRAVENOUS at 09:41

## 2020-08-26 RX ADMIN — ISOSORBIDE DINITRATE 20 MG: 10 TABLET ORAL at 12:10

## 2020-08-26 ASSESSMENT — PULMONARY FUNCTION TESTS
PIF_VALUE: 14
PIF_VALUE: 13
PIF_VALUE: 13
PIF_VALUE: 14
PIF_VALUE: 22
PIF_VALUE: 14
PIF_VALUE: 13
PIF_VALUE: 14
PIF_VALUE: 14
PIF_VALUE: 13
PIF_VALUE: 13
PIF_VALUE: 14
PIF_VALUE: 14
PIF_VALUE: 13
PIF_VALUE: 13
PIF_VALUE: 15
PIF_VALUE: 14
PIF_VALUE: 14
PIF_VALUE: 13
PIF_VALUE: 13
PIF_VALUE: 14
PIF_VALUE: 13
PIF_VALUE: 14
PIF_VALUE: 13
PIF_VALUE: 14
PIF_VALUE: 13
PIF_VALUE: 14
PIF_VALUE: 14
PIF_VALUE: 13
PIF_VALUE: 14
PIF_VALUE: 13
PIF_VALUE: 13
PIF_VALUE: 15
PIF_VALUE: 14
PIF_VALUE: 13
PIF_VALUE: 13
PIF_VALUE: 14
PIF_VALUE: 13
PIF_VALUE: 14
PIF_VALUE: 1
PIF_VALUE: 13
PIF_VALUE: 13
PIF_VALUE: 14
PIF_VALUE: 23
PIF_VALUE: 13
PIF_VALUE: 14
PIF_VALUE: 13
PIF_VALUE: 23
PIF_VALUE: 14
PIF_VALUE: 13
PIF_VALUE: 13
PIF_VALUE: 1
PIF_VALUE: 14
PIF_VALUE: 13
PIF_VALUE: 15
PIF_VALUE: 14
PIF_VALUE: 14
PIF_VALUE: 1
PIF_VALUE: 16
PIF_VALUE: 14
PIF_VALUE: 13
PIF_VALUE: 13
PIF_VALUE: 14
PIF_VALUE: 13
PIF_VALUE: 14
PIF_VALUE: 13
PIF_VALUE: 13
PIF_VALUE: 14
PIF_VALUE: 14
PIF_VALUE: 15
PIF_VALUE: 1
PIF_VALUE: 14
PIF_VALUE: 13
PIF_VALUE: 14
PIF_VALUE: 14
PIF_VALUE: 13
PIF_VALUE: 14
PIF_VALUE: 1
PIF_VALUE: 1
PIF_VALUE: 13
PIF_VALUE: 1
PIF_VALUE: 13
PIF_VALUE: 13
PIF_VALUE: 14
PIF_VALUE: 15
PIF_VALUE: 13
PIF_VALUE: 14
PIF_VALUE: 1
PIF_VALUE: 1
PIF_VALUE: 13
PIF_VALUE: 14
PIF_VALUE: 15
PIF_VALUE: 13

## 2020-08-26 ASSESSMENT — PAIN DESCRIPTION - PAIN TYPE: TYPE: ACUTE PAIN;SURGICAL PAIN

## 2020-08-26 ASSESSMENT — PAIN SCALES - GENERAL
PAINLEVEL_OUTOF10: 0
PAINLEVEL_OUTOF10: 4
PAINLEVEL_OUTOF10: 0
PAINLEVEL_OUTOF10: 0
PAINLEVEL_OUTOF10: 4

## 2020-08-26 ASSESSMENT — PAIN DESCRIPTION - ORIENTATION: ORIENTATION: LEFT

## 2020-08-26 ASSESSMENT — PAIN DESCRIPTION - LOCATION: LOCATION: SHOULDER;ARM

## 2020-08-26 ASSESSMENT — LIFESTYLE VARIABLES: SMOKING_STATUS: 0

## 2020-08-26 ASSESSMENT — PAIN DESCRIPTION - DESCRIPTORS: DESCRIPTORS: DISCOMFORT;THROBBING

## 2020-08-26 NOTE — PLAN OF CARE
Problem: Cardiac Output - Decreased:  Goal: Hemodynamic stability will improve  Description: Hemodynamic stability will improve  8/26/2020 0923 by Enedina Rogel RN  Outcome: Met This Shift     Problem: Pain:  Goal: Control of chronic pain  Description: Control of chronic pain  Outcome: Met This Shift

## 2020-08-26 NOTE — OP NOTE
[address] [facility] Electrophysiology  Procedure Report  PATIENT: Brayden Owen  MEDICAL RECORD NUMBER: [id number]  DATE OF PROCEDURE:  8/26/2020  ATTENDING ELECTROPHYSIOLOGIST:Ruma Melara MD  REFERRING PHYSICIAN: Dr. Marilyn Iqbal    Procedure Performed:  1. Insertion of Dual Chamber Implantable Cardioverter/Defibrillator (Medtronic)  2. Left upper extremity venography  3. Fluoroscopy    Indication for Procedure:  1. NYHA Class II Stage C non-Ischemic Cardiomyopathy with QRS < 120 ms with an EF <=35% with sustained ventricular tachycardia required WCD discharge. Based on ACC/AHA guidelines secondary prevention ICD for prevention of sudden cardiac arrest was recommended. The procedure was described in detail. The risks, benefits, and alternatives to ICD implantation and possible defibrillation threshold testing were discussed with the patient. These risks include but are not limited to: bleeding, infection, vascular injury requiring emergent surgical repair, blood clot, pneumothorax, hemothorax, cardiac valve damage, contrast induced nephropathy which can lead to short or even long term dialysis, cardiac perforation, tamponade, lead dislodgement, and even death. The patient understands these risks and wishes to proceed with ICD implantation and possible  defibrillation threshold testing. Flouroscopy: 6.3 min  Complications: none immediately apparent  EBL: minimal  Specimens: none  Contrast: 10 cc    FINDINGS:  Implanted device information:  1. Pulse Generator is a Spinnaker Coating. Serial # [id number]  Placement: Left pectoral subcutaneous  Date of implant: 8/26/2020  2. Right atrial lead parameters are as follows:  Medtronic Model # [id number]. Serial # [id number]  Lead position: RA  Date of implant: 8/26/2020  P-waves: 3.4 mV  Pacing threshold: 0.5 V at 0.4 ms. Impedance: 513 ohms. 3. Right ventricular lead parameters are as follows:  Medtronic Model [id number]. Serial # [id number]  Lead position: RV  Date of implant: 8/26/2020  R-waves: 6.1 mV  Pacing threshold: 0.5 V at 0.4 ms. Impedance: 437 ohms. 4. Bradycardia parameters:  Mode: AAIR <-> DDDR  Base Rate: 60  AV delay: 350/350  Max Tracking Rate: 130  5. Tachycardia zone configuration:  The patient is programmed with 2 tachycardia zones  VT:  176 beats/min  SVT discriminators are turned on. ATP x 2  35 J x 4  VF: 200 beats/min  35 J x 6    DETAILS OF THE OPERATION: The risks, benefits, alternatives of the procedure were explained to patient and family. They consented and agreed to proceed. Written consent was obtained in the chart. Blood products are not routinely needed for such procedures. The patient was brought to the Electrophysiology lab in a fasting and non-sedated state. The patient had electrocardiographic and hemodynamic monitoring equipment placed. During the case the patient was under the care of an anesthesiologist/CRNA team for sedation and hemodynamic monitoring. A final time out was performed prior to beginning the procedure. The patient was prepped and draped in usual sterile fashion. The left subclavian venogram was performed and showed patent subclavian vein. Twenty mL of 2% lidocaine was used to anesthetize the left pectoral area. Using a #10 blade a 4-cm incision was made below the left clavicle. Using combination of manual dissection and electrocautery, the ICD pocket was created to approximate the size of the patient's device. Hemostasis was achieved with electrocautery and confirmed visually. Using a modified Seldinger technique and a fluroscopic guidance, 2 guide wires were placed in the left subclavian vein. Over one of the short J-tipped guide wire, a 9-Fr Safe-sheath was passed. Through this, the right ventricular lead was advanced without difficulty to the right ventricular septum using a combination of straight and curved stylets and under fluoroscopic guidance.  Mapping of the lead was performed and the lead parameters were deemed to be adequate. The lead was then actively fixated in place. The 9-Fr Safe-sheath was then removed. The lead was then sewn to the pre-pectoral fascia using 0 Ethibond sutures, suturing over the suture sleeve. Over the retained guide-wire an additional 7-Russian sheath was passed. Through this, the right atrial lead was advanced without difficulty to the right atrial appendage using a combination of straight and curved stylets and under fluoroscopic guidance. Mapping of the lead was performed. Lead parameters were deemed to be adequate and lead was then actively fixated in place. The 7-Fr Safe- sheath was then removed. The lead was then sewn to the pre-pectoral fascia using 0 Ethibond sutures, suturing over the suture sleeve. The device pocket was then irrigated with antibiotic solution. The leads were then attached to the appropriate binding posts on the header of the device. The set screws were then tightened with a torque wrench. Tug tests were performed on both leads to insure they are adequately fixated. The device and the leads were then placed within the Tyrx antibiotic pouch and were placed within newly formed device pocket. Lead parameters were then rechecked via the device and deemed to be adequate. The pulse generator was also tied to the pre-pectoral fascia using 0-Ethibond. Surgiflo was injected into the pocket to ensure hemostasis. The incision was closed with 3 layers of absorbable suture, Vicryl. The deepest of which was a 2-0 interrupted stitch followed by a 2nd layer of 3-0 running stitch performed perpendicular to the deep layer and, lastly, a 4-0 subcuticular stitch. The skin was then prepped with benzoin solution, Steri-Strips, and island dressing were then applied.  At the end of the case, the patient was hemodynamically stable and under the care of the anesthesiologist, the patient was brought back to the recovery area for post procedural monitoring. ASSESSMENT:  1. Successful implantation of a Medtronic dual chamber ICD for secondary prevention of sudden cardiac death. RECOMMENDATIONS:  1. Stat portable chest x-ray to rule out pneumothorax and check lead placement now and tomorrow morning. 2. EKG to be performed now. 3. Post-procedural monitoring in the recovery area. 4. The patient will be observed overnight on Telemetry. 5. The patient will receive 24-hours of ariane-operative intravenous antibiotics. 6. The patient's device will be interrogated in the morning by a representative of the device . 7. The patient is to follow-up in device clinic within 2 weeks upon discharge. 8. The patient is advised follow all post-operative device and wound care instructions as per the information provided in the pre-operative clinic.     Rock Carlene MD  Cardiac Electrophysiology  [facility]  [facility] [place]: [facility]  10:52 AM  8/26/2020

## 2020-08-26 NOTE — ANESTHESIA PRE PROCEDURE
succinate (TOPROL XL) extended release tablet 75 mg  75 mg Oral Daily Milind Nevarez MD        aspirin chewable tablet 81 mg  81 mg Oral Daily Milind Nevarez MD        sodium chloride flush 0.9 % injection 10 mL  10 mL Intravenous 2 times per day Milind Nevarez MD   10 mL at 08/25/20 2215    sodium chloride flush 0.9 % injection 10 mL  10 mL Intravenous PRN Milind Nevarez MD        acetaminophen (TYLENOL) tablet 650 mg  650 mg Oral Q6H PRN Milind Nevarez MD        Or    acetaminophen (TYLENOL) suppository 650 mg  650 mg Rectal Q6H PRN Milind Nevarez MD        polyethylene glycol [facility]) packet 17 g  17 g Oral Daily PRN Milind Nevarez MD        promethazine (PHENERGAN) tablet 12.5 mg  12.5 mg Oral Q6H PRN Milind Nevarez MD        [START ON 8/31/2020] amiodarone (CORDARONE) tablet 200 mg  200 mg Oral Daily Milind Nevarez MD           Allergies:     Allergies   Allergen Reactions    Ace Inhibitors Swelling    Lisinopril Swelling     Angioedema       Problem List:    Patient Active Problem List   Diagnosis Code    Primary osteoarthritis of left hip M16.12    Essential hypertension I10    Acute blood loss as cause of postoperative anemia D62    Osteoarthritis of multiple joints M15.9    New onset atrial flutter (HCC) I48.92    Acute decompensated heart failure (HCC) I50.9    New onset of congestive heart failure (HCC) I50.9    Atrial flutter with rapid ventricular response (HCC) I48.92    Acidosis E87.2    Elevated LFTs R94.5    CAD in native artery I25.10    Acute diastolic CHF (congestive heart failure) (HCC) I50.31    Atrial flutter (HCC) I48.92    Nonischemic cardiomyopathy (HCC) I42.8    Ventricular tachycardia (HCC) I47.2    Defibrillator discharge Z45.02       Past Medical History:        Diagnosis Date    Arthritis     [facility]) [facility] [address]     Hypertension     New onset atrial flutter ([facility] [address]     Preoperative clearance 08/18/2016    medical clearance on paper chart from Dr. Harvey Clements. Gerardo    Seasonal allergies        Past Surgical History:        Procedure Laterality Date    BACK SURGERY      DILATATION, ESOPHAGUS      HERNIA REPAIR      HIP SURGERY  09/13/2016    left total; hip arthroplasty       Social History:    Social History     Tobacco Use    Smoking status: Current Some Day Smoker     Packs/day: 0.25     Years: 15.00     Pack years: 3.75     Types: Cigarettes    Smokeless tobacco: Current User   Substance Use Topics    Alcohol use: Yes     Alcohol/week: 3.0 standard drinks     Types: 3 Shots of liquor per week                                Ready to quit: Not Answered  Counseling given: Not Answered      Vital Signs (Current):   Vitals:    08/26/20 0000 08/26/20 0400 08/26/20 0745 08/26/20 0808   BP: 134/80 116/79 131/69 (!) 140/104   Pulse: 100 68 69 74   Resp: 18 16 18 18   Temp: 36.5 °C (97.7 °F) 36.4 °C (97.6 °F) 37.1 °C (98.8 °F) 36.6 °C (97.9 °F)   TempSrc: Oral Oral Oral Temporal   SpO2: 97% 97% 96% 98%   Weight:    226 lb (102.5 kg)   Height:    6' (1.829 m)                                              BP Readings from Last 3 Encounters:   08/26/20 (!) 140/104   08/20/20 119/84   08/19/20 108/66       NPO Status:  >12 hrs                                                                               BMI:   Wt Readings from Last 3 Encounters:   08/26/20 226 lb (102.5 kg)   08/20/20 228 lb (103.4 kg)   08/18/20 188 lb (85.3 kg)     Body mass index is 30.65 kg/m².     CBC:   Lab Results   Component Value Date    WBC 3.4 08/26/2020    RBC 4.02 08/26/2020    HGB 13.7 08/26/2020    HCT 41.9 08/26/2020    .2 08/26/2020    RDW 13.0 08/26/2020     08/26/2020       CMP:   Lab Results   Component Value Date     08/26/2020    K 4.2 08/26/2020    K 4.2 08/26/2020    CL 99 08/26/2020    CO2 26 08/26/2020    BUN 18 08/26/2020    CREATININE 1.7 08/26/2020    GFRAA 50 08/26/2020    LABGLOM 50 08/26/2020    GLUCOSE 93 08/26/2020 PROT 6.2 08/25/2020    CALCIUM 8.9 08/26/2020    BILITOT 0.9 08/25/2020    ALKPHOS 46 08/25/2020    AST 56 08/25/2020    ALT 88 08/25/2020       POC Tests: No results for input(s): POCGLU, POCNA, POCK, POCCL, POCBUN, POCHEMO, POCHCT in the last 72 hours. Coags:   Lab Results   Component Value Date    PROTIME 15.4 08/25/2020    INR 1.4 08/25/2020    APTT 31.5 08/15/2020       HCG (If Applicable): No results found for: PREGTESTUR, PREGSERUM, HCG, HCGQUANT     ABGs: No results found for: PHART, PO2ART, CER9UTX, CZW3SGL, BEART, I8FJCZNO     Type & Screen (If Applicable):  No results found for: LABABO, LABRH    Drug/Infectious Status (If Applicable):  No results found for: HIV, HEPCAB    COVID-19 Screening (If Applicable):   Lab Results   Component Value Date    COVID19 Not Detected 08/15/2020         Anesthesia Evaluation  Patient summary reviewed and Nursing notes reviewed no history of anesthetic complications:   Airway: Mallampati: II  TM distance: >3 FB   Neck ROM: full  Mouth opening: > = 3 FB Dental: normal exam         Pulmonary:normal exam  breath sounds clear to auscultation      (-) not a current smoker (quit 1 mo ago)                           Cardiovascular:  Exercise tolerance: good (>4 METS),   (+) hypertension:, pacemaker: AICD, CAD:, dysrhythmias: atrial flutter and ventricular tachycardia, CHF (NICM): diastolic,       ECG reviewed  Rhythm: regular  Rate: normal      Cleared by cardiology           ROS comment: Was wearing LIFEVEST and shocked at home     Neuro/Psych:                ROS comment: L4-5 lumbar laminectomy GI/Hepatic/Renal: Neg GI/Hepatic/Renal ROS           ROS comment: Esophageal dilation  Hernia repair. Endo/Other:    (+) blood dyscrasia: anticoagulation therapy and anemia, arthritis (L THOM): OA., . Pt had no PAT visit        ROS comment: ETOH 3x wk Abdominal:       Abdomen: soft. Vascular: negative vascular ROS.                                     1. No pulmonary embolism. 2. Cardiomegaly with mild interstitial edema in the lungs indicating CHF exacerbation. 3. Hepatic steatosis. No airspace opacities or pleural effusion. Impression No evidence to suggest deep venous thrombosis of the bilateral lower extremities. Summary   Left ventricle is moderately enlarged . Ejection fraction is visually estimated at 20-25%. Overall ejection fraction severely decreased . Normal left ventricle wall thickness. Severely dilated left atrium. No evidence of thrombus within left atrium or appendage. Normal right ventricular size and function. Moderate mitral regurgitation with centrally directed jet. No hemodynamically significant aortic stenosis is present. Mild tricuspid regurgitation. RVSP is 27 mmHg. Normal estimated PA systolic pressure. No evidence for hemodynamically significant pericardial effusion. IMPRESSION:  1. Questionable 50% discrete distal RCA stenosis versus most likely  overlap of the proximal segment of PLV and PDA. 2.  Absence of angiographic stenosis in the left main, LAD or left  circumflex. 3.  Elevated LVEDP at 23 mmHg. Anesthesia Plan      MAC     ASA 4       Induction: intravenous. Anesthetic plan and risks discussed with patient. Use of blood products discussed with patient whom. Plan discussed with attending.                   ROSALINDA Buitrago - CRNA   8/26/2020

## 2020-08-26 NOTE — H&P
Admission History and Physical                                                                                    Tamiko Yepez MD, FACP                   Patient Name: Mason Patrick                   Age:  64 y.o. Gender:   male    CC: LifeVest discharge    HPI: This patient was recently diagnosed with nonischemic cardia myopathy, acute diastolic CHF, ventricular tachycardia, atrial flutter of new onset. He was evaluated at [facility] and discharged with a LifeVest.  Apparently he was in some kind of stressful situation and he received a shock  The patient was evaluated by electrophysiology for polymorphic ventricular tachycardia and ventricular fibrillation on 8/21/2020  He was set up for a ICD implant and received that this morning    His history is outlined by the electrophysiology note:  Nonischemic cardiomyopathy  Nonobstructive coronary artery disease  LV ejection fraction of 20 to 25%  Atrial flutter on [place]  Moderate to severe mitral regurgitation and moderate tricuspid regurgitation      Past Medical History:   Diagnosis Date    Arthritis     [facility] [facility]     Hypertension     New onset atrial flutter ([facility]     Preoperative clearance 08/18/2016    medical clearance on paper chart from Dr. Nael Johns. Gerardo    Seasonal allergies        Past Surgical History:   Procedure Laterality Date    BACK SURGERY      DILATATION, ESOPHAGUS      HERNIA REPAIR      HIP SURGERY  09/13/2016    left total; hip arthroplasty       Family Status   Relation Name Status    Brother  (Not Specified)    PAunt  (Not Specified)    [employer]  (Not Specified)        Prior to Admission medications    Medication Sig Start Date End Date Taking?  Authorizing Provider   isosorbide dinitrate (ISORDIL) 20 MG tablet Take 1 tablet by mouth 3 times daily 8/20/20   Ashley Mosher PA-C   amiodarone (CORDARONE) 200 MG tablet Take 2 tablets by Emotionally abused: None     Physically abused: None     Forced sexual activity: None   Other Topics Concern    None   Social History Narrative    None       Allergies   Allergen Reactions    Ace Inhibitors Swelling    Lisinopril Swelling     Angioedema       The patient's medical records have been reviewed contingent on availability        Review of Systems:   · General: Denies malaise or weakness. Denies fever or chills. · Eyes: No visual changes or diplopia. No swelling or pain. · ENT: No Headaches, tinnitus or vertigo. No mouth sores or sore throat. · Cardiovascular: As per HPI  · Respiratory: No cough or wheezing, hemoptysis,  pleuritic pain. · Gastrointestinal: No abdominal pain, anorexia, hematochezia, melena, hematemesis or change in bowels. · Genitourinary: No dysuria, trouble voiding, or hematuria. No change in urination. · Musculoskeletal:  No joint pain or inflammation. No limb weakness. · Integumentary: No rash or pruritis. No abnormal pigmentation,  masses, hair or nail changes  · Neurological: No unusual headaches, weakness, paresthesias. No change in gait, balance, coordination, memory, mentation or behavior. · Psychiatric: No anxiety, or depression. Mood and affect reported as normal  · Endocrine: No temperature intolerance. No polydipsia or polyuria. · Hematologic: No abnormal bruising or bleeding, blood clots or swollen lymph nodes. no anemia, no fever,chills, night sweats, swollen glands. · Allergic/Immunologic: No nasal congestion or hives.       Physical Examination:      Wt Readings from Last 3 Encounters:   08/26/20 226 lb (102.5 kg)   08/20/20 228 lb (103.4 kg)   08/18/20 188 lb (85.3 kg)     Temp Readings from Last 3 Encounters:   08/26/20 97.9 °F (36.6 °C) (Temporal)   08/20/20 97.8 °F (36.6 °C) (Oral)   08/18/20 98.1 °F (36.7 °C) (Temporal)     BP Readings from Last 3 Encounters:   08/26/20 (!) 140/104   08/26/20 103/71   08/20/20 119/84 Pulse Readings from Last 3 Encounters:   08/26/20 74   08/20/20 76   08/18/20 (!) 46       General appearance: Normal, awake, alert no distress. Skin: Color, texture, turgor normal. No rashes or lesions. Head: Normocephalic. No masses, lesions, tenderness or abnormalities   Face: Symmetric no visible lesions  Eyes: Conjunctivae/cornea clear. Jayda July. Sclera non icteric. Ears: External appearance normal.  Hearing grossly normal  Nose/Sinuses: Nares normal. No paranasal sinus tenderness. Mouth: Lips and tongue appear normal. Dentition noted  Neck:  Symmetric. No adenopathy. Thyroid symmetric, normal size, without nodules. Trachea is midline. Chest: Even excursion   Lungs: Clear to auscultation. No rhonchi, crackles or rales. Heart: S1 > S2. Rhythm is regular and rate is normal. No gallop rub or murmur. Abdomen: Soft, mildly protuberant, non-tender. BS normal. No masses, organomegaly. Anatomic contours appear normal.  Extremities: No deformities, edema, or skin discoloration. Peripheral perfusion assessed in all exremities. No cyanosis  Musculoskeletal: No unusual pain or swelling. Muscular strength intact. Neuro:   · Cranial nerves grossly intact. · Motor: Strength grossly normal. No focal weakness. · Sensory: grossly normal to touch. · Cerebellar testing was grossly normal  Mental status: Awake, alert, cognizant of person, place, time.     Patient appears capable of directing self care   Mood: Normal and appropriate affect  Gait & balance: Independently ambulatory     Labs     CBC:   Lab Results   Component Value Date    WBC 3.4 08/26/2020    RBC 4.02 08/26/2020    HGB 13.7 08/26/2020    HCT 41.9 08/26/2020     08/26/2020    .2 08/26/2020     BMP:    Lab Results   Component Value Date     08/26/2020    K 4.2 08/26/2020    K 4.2 08/26/2020    CL 99 08/26/2020    CO2 26 08/26/2020    BUN 18 08/26/2020    CREATININE 1.7 08/26/2020    GLUCOSE 93 08/26/2020    CALCIUM 8.9 08/26/2020     Hepatic Function Panel:    Lab Results   Component Value Date    ALKPHOS 46 08/25/2020    AST 56 08/25/2020    ALT 88 08/25/2020    PROT 6.2 08/25/2020    LABALBU 3.8 08/25/2020    BILITOT 0.9 08/25/2020     Magnesium:    Lab Results   Component Value Date    MG 2.1 08/26/2020     Cardiac Enzymes:   Lab Results   Component Value Date    TROPONINI <0.01 08/25/2020    TROPONINI 0.01 08/16/2020    TROPONINI <0.01 08/15/2020     LDH:  No results found for: LDH  PT/INR:    Lab Results   Component Value Date    PROTIME 15.4 08/25/2020    INR 1.4 08/25/2020     BNP: No results for input(s): BNP in the last 72 hours.    TSH:   Lab Results   Component Value Date    TSH 3.300 08/15/2020      Cardiac Injury Profile:   Recent Labs     08/25/20  1421   TROPONINI <0.01      Lipid Profile:   Lab Results   Component Value Date    HDL 57 08/16/2020    LDLCALC 83 08/16/2020      Hemoglobin A1C: No components found for: HGBA1C   U/A: No results found for: NITRITE, LEUKOCYTESUR, PHUR, WBCUA, RBCUA, BACTERIA, SPECGRAV, BLOODU, GLUCOSEU      ADMISSION SCHEDULED MEDS:   Current Facility-Administered Medications   Medication Dose Route Frequency Provider Last Rate Last Dose    sodium chloride flush 0.9 % injection 10 mL  10 mL Intravenous 2 times per day Franny Jaquez MD        sodium chloride flush 0.9 % injection 10 mL  10 mL Intravenous PRN Ruma Jose MD        ondansetron (ZOFRAN) injection 4 mg  4 mg Intravenous Q6H PRN Franny Jaquez MD        vancomycin 1.5 g in dextrose 5% 300 mL IVPB  1,500 mg Intravenous Q12H Ruma Jose MD        amiodarone (CORDARONE) tablet 400 mg  400 mg Oral BID Franny Jaquez MD   400 mg at 08/26/20 1210    [Held by provider] apixaban (ELIQUIS) tablet 5 mg  5 mg Oral BID Franny Jaquez MD        furosemide (LASIX) tablet 40 mg  40 mg Oral Daily Ruma Jose MD   40 mg at 08/26/20 1210    hydrALAZINE (APRESOLINE) tablet 25 mg  25 mg Oral 3 times per day Bette Hernandez MD        isosorbide dinitrate (ISORDIL) tablet 20 mg  20 mg Oral TID Bette Hernandez MD   20 mg at 08/26/20 1210    metoprolol succinate (TOPROL XL) extended release tablet 75 mg  75 mg Oral Daily Bette Hernandez MD   75 mg at 08/26/20 1210    aspirin chewable tablet 81 mg  81 mg Oral Daily Ruma Jose MD   81 mg at 08/26/20 1210    acetaminophen (TYLENOL) tablet 650 mg  650 mg Oral Q6H PRN Ruma Jose MD        Or    acetaminophen (TYLENOL) suppository 650 mg  650 mg Rectal Q6H PRN Ruma Jose MD        polyethylene glycol (GLYCOLAX) packet 17 g  17 g Oral Daily PRN Ruma Jose MD        promethazine (PHENERGAN) tablet 12.5 mg  12.5 mg Oral Q6H PRN Bette Hernandez MD        [START ON 8/31/2020] amiodarone (CORDARONE) tablet 200 mg  200 mg Oral Daily Ruma Jose MD           Current  Infusions      Prn Meds  sodium chloride flush, ondansetron, acetaminophen **OR** acetaminophen, polyethylene glycol, promethazine **OR** [DISCONTINUED] ondansetron    Radiology Review:  XR CHEST PORTABLE   Final Result   Left subclavian pacemaker in appropriate position without   complication. Cardiomegaly with the perihilar/peripheral hazy opacities likely mild   CHF. Pneumonia is not excluded. XR CHEST PORTABLE   Final Result      No airspace opacities or pleural effusion.             XR CHEST PORTABLE    (Results Pending)         ASSESSMENT:  Active diagnoses treated at this admission:  Nonischemic cardiomyopathy  LV ejection fraction 20 to 25%  Ventricular tachycardia polymorphic and ventricular fibrillation  ICD implantation today  History of substantial alcohol consumption  Disturbed liver function studies    Problem list:  Patient Active Problem List   Diagnosis    Primary osteoarthritis of left hip    Essential hypertension    Acute blood loss as cause of postoperative anemia    Osteoarthritis of multiple joints    New onset atrial flutter (HCC)    Acute decompensated heart failure (HCC)    New onset of congestive heart failure (HCC)    Atrial flutter with rapid ventricular response (HCC)    Acidosis    Elevated LFTs    CAD in native artery    Acute diastolic CHF (congestive heart failure) (HCC)    Atrial flutter (HCC)    Nonischemic cardiomyopathy (HCC)    Ventricular tachycardia ([facility] [address]    Defibrillator discharge       PLAN:  Continue to monitoring post ICD implant  Discussion concerning alcohol consumption  Resume anticoagulation  Overnight telemetry      See  Orders  Ramila Pantoja MD, Malvin Krueger, [address] [place] [address] Rd., [address] of Internal Medicine  Diplomate, Geriatric Medicine, [address] [place] [address] Rd., [address] of Internal Medicine  12:12 PM  8/26/2020

## 2020-08-26 NOTE — ANESTHESIA POSTPROCEDURE EVALUATION
Department of Anesthesiology  Postprocedure Note    Patient: Dallas Echavarria  MRN: [id number]  YOB: 1960  Date of evaluation: 8/26/2020  Time:  5:06 PM     Procedure Summary     Date:  08/26/20 Room / Location:  [facility]    Anesthesia Start:  6973 Anesthesia Stop:  9055    Procedure:  ICD W ANESTHESIA Diagnosis:      Scheduled Providers:  Jordan Horn DO; ROSALINDA Lion CRNA Responsible Provider:  Jordan Horn DO    Anesthesia Type:  MAC ASA Status:  4          Anesthesia Type: MAC    Garry Phase I:      Garry Phase II:      Last vitals: Reviewed and per EMR flowsheets.        Anesthesia Post Evaluation    Patient location during evaluation: bedside  Patient participation: complete - patient cannot participate  Level of consciousness: awake and alert  Airway patency: patent  Nausea & Vomiting: no nausea and no vomiting  Complications: no  Cardiovascular status: blood pressure returned to baseline  Respiratory status: acceptable  Hydration status: euvolemic

## 2020-08-26 NOTE — PLAN OF CARE
Problem: Cardiac Output - Decreased:  Goal: Hemodynamic stability will improve  Description: Hemodynamic stability will improve  Outcome: Met This Shift

## 2020-08-27 ENCOUNTER — APPOINTMENT (OUTPATIENT)
Dept: GENERAL RADIOLOGY | Age: 60
DRG: 227 | End: 2020-08-27
Payer: OTHER GOVERNMENT

## 2020-08-27 VITALS
SYSTOLIC BLOOD PRESSURE: 117 MMHG | HEIGHT: 72 IN | TEMPERATURE: 98.2 F | OXYGEN SATURATION: 99 % | RESPIRATION RATE: 16 BRPM | BODY MASS INDEX: 30.61 KG/M2 | WEIGHT: 226 LBS | HEART RATE: 64 BPM | DIASTOLIC BLOOD PRESSURE: 81 MMHG

## 2020-08-27 LAB
ANION GAP SERPL CALCULATED.3IONS-SCNC: 14 MMOL/L (ref 7–16)
BUN BLDV-MCNC: 16 MG/DL (ref 6–20)
CALCIUM SERPL-MCNC: 8.9 MG/DL (ref 8.6–10.2)
CHLORIDE BLD-SCNC: 99 MMOL/L (ref 98–107)
CO2: 24 MMOL/L (ref 22–29)
CREAT SERPL-MCNC: 1.4 MG/DL (ref 0.7–1.2)
EKG ATRIAL RATE: 60 BPM
EKG P AXIS: 101 DEGREES
EKG P-R INTERVAL: 216 MS
EKG Q-T INTERVAL: 420 MS
EKG QRS DURATION: 94 MS
EKG QTC CALCULATION (BAZETT): 420 MS
EKG R AXIS: -6 DEGREES
EKG T AXIS: 87 DEGREES
EKG VENTRICULAR RATE: 60 BPM
GFR AFRICAN AMERICAN: >60
GFR NON-AFRICAN AMERICAN: >60 ML/MIN/1.73
GLUCOSE BLD-MCNC: 95 MG/DL (ref 74–99)
HCT VFR BLD CALC: 42.7 % (ref 37–54)
HEMOGLOBIN: 14.1 G/DL (ref 12.5–16.5)
MAGNESIUM: 2 MG/DL (ref 1.6–2.6)
MCH RBC QN AUTO: 34.2 PG (ref 26–35)
MCHC RBC AUTO-ENTMCNC: 33 % (ref 32–34.5)
MCV RBC AUTO: 103.6 FL (ref 80–99.9)
PDW BLD-RTO: 13 FL (ref 11.5–15)
PLATELET # BLD: 253 E9/L (ref 130–450)
PMV BLD AUTO: 11.3 FL (ref 7–12)
POTASSIUM SERPL-SCNC: 4.1 MMOL/L (ref 3.5–5)
RBC # BLD: 4.12 E12/L (ref 3.8–5.8)
SODIUM BLD-SCNC: 137 MMOL/L (ref 132–146)
WBC # BLD: 4.3 E9/L (ref 4.5–11.5)

## 2020-08-27 PROCEDURE — 6370000000 HC RX 637 (ALT 250 FOR IP): Performed by: INTERNAL MEDICINE

## 2020-08-27 PROCEDURE — 85027 COMPLETE CBC AUTOMATED: CPT

## 2020-08-27 PROCEDURE — 71045 X-RAY EXAM CHEST 1 VIEW: CPT

## 2020-08-27 PROCEDURE — 2580000003 HC RX 258: Performed by: INTERNAL MEDICINE

## 2020-08-27 PROCEDURE — 80048 BASIC METABOLIC PNL TOTAL CA: CPT

## 2020-08-27 PROCEDURE — 83735 ASSAY OF MAGNESIUM: CPT

## 2020-08-27 PROCEDURE — 99024 POSTOP FOLLOW-UP VISIT: CPT | Performed by: INTERNAL MEDICINE

## 2020-08-27 PROCEDURE — 93005 ELECTROCARDIOGRAM TRACING: CPT | Performed by: NURSE PRACTITIONER

## 2020-08-27 PROCEDURE — 36415 COLL VENOUS BLD VENIPUNCTURE: CPT

## 2020-08-27 PROCEDURE — 93010 ELECTROCARDIOGRAM REPORT: CPT | Performed by: INTERNAL MEDICINE

## 2020-08-27 RX ORDER — AMIODARONE HYDROCHLORIDE 200 MG/1
200 TABLET ORAL DAILY
Qty: 30 TABLET | Refills: 3 | Status: SHIPPED | OUTPATIENT
Start: 2020-08-31 | End: 2020-09-09 | Stop reason: ALTCHOICE

## 2020-08-27 RX ORDER — AMIODARONE HYDROCHLORIDE 400 MG/1
400 TABLET ORAL 2 TIMES DAILY
Qty: 30 TABLET | Refills: 3 | Status: SHIPPED | OUTPATIENT
Start: 2020-08-27 | End: 2020-09-09

## 2020-08-27 RX ADMIN — FUROSEMIDE 40 MG: 40 TABLET ORAL at 08:54

## 2020-08-27 RX ADMIN — SODIUM CHLORIDE, PRESERVATIVE FREE 10 ML: 5 INJECTION INTRAVENOUS at 08:54

## 2020-08-27 RX ADMIN — ASPIRIN 81 MG CHEWABLE TABLET 81 MG: 81 TABLET CHEWABLE at 08:54

## 2020-08-27 RX ADMIN — ISOSORBIDE DINITRATE 20 MG: 10 TABLET ORAL at 08:54

## 2020-08-27 RX ADMIN — AMIODARONE HYDROCHLORIDE 400 MG: 200 TABLET ORAL at 08:55

## 2020-08-27 RX ADMIN — METOPROLOL SUCCINATE 75 MG: 25 TABLET, EXTENDED RELEASE ORAL at 08:54

## 2020-08-27 RX ADMIN — HYDRALAZINE HYDROCHLORIDE 25 MG: 25 TABLET, FILM COATED ORAL at 06:07

## 2020-08-27 RX ADMIN — ISOSORBIDE DINITRATE 20 MG: 10 TABLET ORAL at 14:43

## 2020-08-27 RX ADMIN — HYDRALAZINE HYDROCHLORIDE 25 MG: 25 TABLET, FILM COATED ORAL at 14:43

## 2020-08-27 ASSESSMENT — PAIN SCALES - GENERAL
PAINLEVEL_OUTOF10: 0
PAINLEVEL_OUTOF10: 0

## 2020-08-27 NOTE — PLAN OF CARE
Problem: Cardiac Output - Decreased:  Goal: Hemodynamic stability will improve  Description: Hemodynamic stability will improve  8/27/2020 8474 by Huong Younger RN  Outcome: Met This Shift  8/27/2020 0155 by Oracio Barba RN  Outcome: Met This Shift     Problem: Pain:  Goal: Pain level will decrease  Description: Pain level will decrease  8/27/2020 5561 by Huong Younger RN  Outcome: Met This Shift  8/27/2020 0155 by Oracio Barba RN  Outcome: Met This Shift     Problem: Falls - Risk of:  Goal: Will remain free from falls  Description: Will remain free from falls  8/27/2020 6236 by Huong Younger RN  Outcome: Met This Shift  8/27/2020 0155 by Oracio Barba RN  Outcome: Met This Shift  Goal: Absence of physical injury  Description: Absence of physical injury  8/27/2020 0155 by Oracio Barba RN  Outcome: Met This Shift

## 2020-08-27 NOTE — PROGRESS NOTES
Admission History and Physical                                                                                    Janet Chamberlain MD, FACP                   Patient Name: Jarred Mi                   Age:  64 y.o. Gender:   male    CC: LifeVest discharge    HPI: This patient was recently diagnosed with nonischemic cardia myopathy, acute diastolic CHF, ventricular tachycardia, atrial flutter of new onset. He was evaluated at [facility] and discharged with a LifeVest.  Apparently he was in some kind of stressful situation and he received a shock  The patient was evaluated by electrophysiology for polymorphic ventricular tachycardia and ventricular fibrillation on 8/21/2020  He was set up for a ICD implant and received that this morning    His history is outlined by the electrophysiology note:  Nonischemic cardiomyopathy  Nonobstructive coronary artery disease  LV ejection fraction of 20 to 25%  Atrial flutter on Eliquis  Moderate to severe mitral regurgitation and moderate tricuspid regurgitation    8/27:  He feels well has no symptoms no chest pain or pressure and is ambulated  Vitals are normal and labs are stabilized  Past Medical History:   Diagnosis Date    Arthritis     [facility]estive [facility]) [facility] [address]     Hypertension     New onset atrial flutter ([facility] [address]     Preoperative clearance 08/18/2016    medical clearance on paper chart from Dr. Kushal Diamond.  Gerardo    Seasonal allergies        Past Surgical History:   Procedure Laterality Date    BACK SURGERY      DILATATION, ESOPHAGUS      HERNIA REPAIR      HIP SURGERY  09/13/2016    left total; hip arthroplasty       Review of Systems:   General: No new issues other than HPI    Physical Examination:      Wt Readings from Last 3 Encounters:   08/26/20 226 lb (102.5 kg)   08/20/20 228 lb (103.4 kg)   08/18/20 188 lb (85.3 kg)     Temp Readings from Last 3 Encounters:   08/27/20 98.2 °F (36.8 °C) (Oral)   08/20/20 97.8 °F (36.6 °C) (Oral)   08/18/20 98.1 °F (36.7 °C) (Temporal)     BP Readings from Last 3 Encounters:   08/27/20 117/81   08/26/20 103/71   08/20/20 119/84     Pulse Readings from Last 3 Encounters:   08/27/20 64   08/20/20 76   08/18/20 (!) 46       General appearance: Normal, awake, alert no distress. Skin: Color, texture, turgor normal. No rashes or lesions. Head: Normocephalic. No masses, lesions, tenderness or abnormalities   Face: Symmetric no visible lesions  Eyes: Conjunctivae/cornea clear. Lisette Cowman. Sclera non icteric. Ears: External appearance normal.  Hearing grossly normal  Nose/Sinuses: Nares normal. No paranasal sinus tenderness. Mouth: Lips and tongue appear normal. Dentition noted  Neck:  Symmetric. No adenopathy. Thyroid symmetric, normal size, without nodules. Trachea is midline. Chest: Even excursion ICD placement left upper chest arm in a sling  Lungs: Clear to auscultation. No rhonchi, crackles or rales. Heart: S1 > S2. Rhythm is regular and rate is normal. No gallop rub or murmur. Abdomen: Soft, mildly protuberant, non-tender. BS normal. No masses, organomegaly. Anatomic contours appear normal.  Extremities: No deformities, edema, or skin discoloration. Peripheral perfusion assessed in all exremities. No cyanosis  Musculoskeletal: No unusual pain or swelling. Muscular strength intact. Neuro:   · Cranial nerves grossly intact. · Motor: Strength grossly normal. No focal weakness. · Sensory: grossly normal to touch. · Cerebellar testing was grossly normal  Mental status: Awake, alert, cognizant of person, place, time.     Patient appears capable of directing self care   Mood: Normal and appropriate affect  Gait & balance: Independently ambulatory     Labs     CBC:   Lab Results   Component Value Date    WBC 4.3 08/27/2020    RBC 4.12 08/27/2020    HGB 14.1 08/27/2020    HCT 42.7 08/27/2020     08/27/2020    .6 08/27/2020     BMP: Lab Results   Component Value Date     08/27/2020    K 4.1 08/27/2020    K 4.2 08/26/2020    CL 99 08/27/2020    CO2 24 08/27/2020    BUN 16 08/27/2020    CREATININE 1.4 08/27/2020    GLUCOSE 95 08/27/2020    CALCIUM 8.9 08/27/2020     Hepatic Function Panel:    Lab Results   Component Value Date    ALKPHOS 46 08/25/2020    AST 56 08/25/2020    ALT 88 08/25/2020    PROT 6.2 08/25/2020    LABALBU 3.8 08/25/2020    BILITOT 0.9 08/25/2020     Magnesium:    Lab Results   Component Value Date    MG 2.0 08/27/2020     Cardiac Enzymes:   Lab Results   Component Value Date    TROPONINI <0.01 08/25/2020    TROPONINI 0.01 08/16/2020    TROPONINI <0.01 08/15/2020     LDH:  No results found for: LDH  PT/INR:    Lab Results   Component Value Date    PROTIME 15.4 08/25/2020    INR 1.4 08/25/2020     BNP: No results for input(s): BNP in the last 72 hours.    TSH:   Lab Results   Component Value Date    TSH 3.300 08/15/2020      Cardiac Injury Profile:   Recent Labs     08/25/20  1421   TROPONINI <0.01      Lipid Profile:   Lab Results   Component Value Date    HDL 57 08/16/2020    LDLCALC 83 08/16/2020      Hemoglobin A1C: No components found for: HGBA1C   U/A: No results found for: NITRITE, LEUKOCYTESUR, PHUR, WBCUA, RBCUA, BACTERIA, SPECGRAV, BLOODU, GLUCOSEU      ADMISSION SCHEDULED MEDS:   Current Facility-Administered Medications   Medication Dose Route Frequency Provider Last Rate Last Dose    apixaban (ELIQUIS) tablet 5 mg  5 mg Oral BID ROSALINDA José - CNP        sodium chloride flush 0.9 % injection 10 mL  10 mL Intravenous 2 times per day Ashok Winslow MD   10 mL at 08/27/20 0854    sodium chloride flush 0.9 % injection 10 mL  10 mL Intravenous PRN Ruma Jose MD        ondansetron (ZOFRAN) injection 4 mg  4 mg Intravenous Q6H PRN Ruma Jose MD        amiodarone (CORDARONE) tablet 400 mg  400 mg Oral BID Ashok Winslow MD   400 mg at 08/27/20 0855    furosemide (LASIX) tablet 40 mg  40 mg Oral Daily Belle Sosa MD   40 mg at 08/27/20 0854    hydrALAZINE (APRESOLINE) tablet 25 mg  25 mg Oral 3 times per day Belle Sosa MD   25 mg at 08/27/20 7614    isosorbide dinitrate (ISORDIL) tablet 20 mg  20 mg Oral TID Belle Sosa MD   20 mg at 08/27/20 0854    metoprolol succinate (TOPROL XL) extended release tablet 75 mg  75 mg Oral Daily Belle Sosa MD   75 mg at 08/27/20 8609    aspirin chewable tablet 81 mg  81 mg Oral Daily Belle Sosa MD   81 mg at 08/27/20 0854    acetaminophen (TYLENOL) tablet 650 mg  650 mg Oral Q6H PRN Ruma Jose MD   650 mg at 08/26/20 2033    Or    acetaminophen (TYLENOL) suppository 650 mg  650 mg Rectal Q6H PRN Ruma Jose MD        polyethylene glycol (GLYCOLAX) packet 17 g  17 g Oral Daily PRN Ruma Jose MD        promethazine (PHENERGAN) tablet 12.5 mg  12.5 mg Oral Q6H PRN Belle Sosa MD        [START ON 8/31/2020] amiodarone (CORDARONE) tablet 200 mg  200 mg Oral Daily Ruma Jose MD           Current  Infusions      Prn Meds  sodium chloride flush, ondansetron, acetaminophen **OR** acetaminophen, polyethylene glycol, promethazine **OR** [DISCONTINUED] ondansetron    Radiology Review:  XR CHEST PORTABLE   Final Result   No new abnormal findings. No significant interval change. XR CHEST PORTABLE   Final Result   Left subclavian pacemaker in appropriate position without   complication. Cardiomegaly with the perihilar/peripheral hazy opacities likely mild   CHF. Pneumonia is not excluded. XR CHEST PORTABLE   Final Result      No airspace opacities or pleural effusion.                   ASSESSMENT:  Active diagnoses treated at this admission:  Nonischemic cardiomyopathy  LV ejection fraction 20 to 25%  Ventricular tachycardia polymorphic and ventricular fibrillation  ICD implantation today  History of substantial alcohol consumption  Disturbed liver function studies    Problem list:  Patient Active Problem List   Diagnosis    Primary osteoarthritis of left hip    Essential hypertension    Acute blood loss as cause of postoperative anemia    Osteoarthritis of multiple joints    New onset atrial flutter (HCC)    Acute decompensated heart failure ([address]    New onset of congestive heart failure (HCC)    Atrial flutter with rapid ventricular response (HCC)    Acidosis    Elevated LFTs    CAD in native artery    Acute diastolic CHF (congestive heart failure) (HCC)    Atrial flutter (HCC)    Nonischemic cardiomyopathy (HCC)    Ventricular tachycardia ([address]    Defibrillator discharge       PLAN:  Continue to monitoring post ICD implant  Discussion concerning alcohol consumption  Resume anticoagulation  Possible discharge today      See  Orders  Gabino Flores MD, Belinda Driscoll, [employer] Board of Internal Medicine  Diplomate, Geriatric Medicine, [address] [place] [address] Rd., [address] of Internal Medicine  10:02 AM  8/27/2020

## 2020-08-27 NOTE — PLAN OF CARE
Problem: Cardiac Output - Decreased:  Goal: Hemodynamic stability will improve  Description: Hemodynamic stability will improve  Outcome: Met This Shift     Problem: Pain:  Goal: Pain level will decrease  Description: Pain level will decrease  Outcome: Met This Shift     Problem: Falls - Risk of:  Goal: Will remain free from falls  Description: Will remain free from falls  Outcome: Met This Shift  Goal: Absence of physical injury  Description: Absence of physical injury  Outcome: Met This Shift

## 2020-08-27 NOTE — PROGRESS NOTES
CAD in native artery    Acute diastolic CHF (congestive heart failure) (HCC)    Atrial flutter (HCC)    Nonischemic cardiomyopathy (HCC)    Ventricular tachycardia (HCC)    Defibrillator discharge         Scheduled Medications:   apixaban  5 mg Oral BID    sodium chloride flush  10 mL Intravenous 2 times per day    amiodarone  400 mg Oral BID    furosemide  40 mg Oral Daily    hydrALAZINE  25 mg Oral 3 times per day    isosorbide dinitrate  20 mg Oral TID    metoprolol succinate  75 mg Oral Daily    aspirin  81 mg Oral Daily    [START ON 8/31/2020] amiodarone  200 mg Oral Daily       Infusion Medications:      PRN Medications:  sodium chloride flush, ondansetron, acetaminophen **OR** acetaminophen, polyethylene glycol, promethazine **OR** [DISCONTINUED] ondansetron    Allergies   Allergen Reactions    Ace Inhibitors Swelling    Lisinopril Swelling     Angioedema       Wt Readings from Last 3 Encounters:   08/26/20 226 lb (102.5 kg)   08/20/20 228 lb (103.4 kg)   08/18/20 188 lb (85.3 kg)     Temp Readings from Last 3 Encounters:   08/27/20 98.2 °F (36.8 °C) (Oral)   08/20/20 97.8 °F (36.6 °C) (Oral)   08/18/20 98.1 °F (36.7 °C) (Temporal)     BP Readings from Last 3 Encounters:   08/27/20 117/81   08/26/20 103/71   08/20/20 119/84     Pulse Readings from Last 3 Encounters:   08/27/20 64   08/20/20 76   08/18/20 (!) 46         Intake/Output Summary (Last 24 hours) at 8/27/2020 1012  Last data filed at 8/27/2020 0420  Gross per 24 hour   Intake 1420 ml   Output 850 ml   Net 570 ml       ROS:   Constitutional: Negative for fever. Positive for activity change and negative for appetite change. HENT: Negative for epistaxis. Eyes: Negative for diploplia, blurred vision. Respiratory: Negative for cough, chest tightness, shortness of breath and wheezing. Cardiovascular: pertinent positives in HPI  Gastrointestinal: Negative for abdominal pain and blood in stool.    All other review of systems are negative       PHYSICAL EXAM:  Vitals:    20 2003 20 0000 20 0400 20 0730   BP: (!) 132/94 108/78 128/87 117/81   Pulse: 67 65 59 64   Resp: 16 16 17 16   Temp: 97.8 °F (36.6 °C) 98.1 °F (36.7 °C) 98.2 °F (36.8 °C) 98.2 °F (36.8 °C)   TempSrc: Oral Oral Temporal Oral   SpO2: 96% 98% 97% 99%   Weight:       Height:         Constitutional: Well-developed, no acute distress  Eyes: conjunctivae normal, no xanthelasma   Ears, Nose, Throat: oral mucosa moist, no cyanosis   CV: no JVD. Regular rate and rhythm. Normal S1S2 and no S3. No murmurs, rubs, or gallops. PMI is nondisplaced  Lungs: clear to auscultation bilaterally, normal respiratory effort without used of accessory muscles  Abdomen: soft, non-tender, bowel sounds present, no masses or hepatomegaly   Musculoskeletal: no digital clubbing, no edema   Skin: warm, no rashes   Device site:, Left chest dressing clean dry and intact, slight edema noted, both pectorals are large in nature. Pertinent Labs:  CBC:   Recent Labs     20  1421 20  0455 20  0540   WBC 4.6 3.4* 4.3*   HGB 14.2 13.7 14.1   HCT 42.8 41.9 42.7    258 253     BMP:  Recent Labs     20  1421 20  0455 20  0540    138 137   K 4.2 4.2  4.2 4.1    99 99   CO2 23 26 24   BUN 17 18 16   CREATININE 1.6* 1.7* 1.4*   CALCIUM 8.8 8.9 8.9     ABGs: No results found for: PHART, PO2ART, DAO4LUI  INR:   Recent Labs     20  1421   INR 1.4     BNP: No results for input(s): BNP in the last 72 hours.    TSH:   Lab Results   Component Value Date    TSH 3.300 08/15/2020      Cardiac Injury Profile:   Recent Labs     20  142   TROPONINI <0.01      Lipid Profile:   Lab Results   Component Value Date    HDL 57 2020    LDLCALC 83 2020      Hemoglobin A1C: No components found for: HGBA1C   Xray: Xr Chest Portable    Result Date: 2020  Patient MRN:  [id number] : 1960 Age: 64 years Gender: Male Order Date: 2020 11:39 AM EXAM: XR CHEST PORTABLE one image INDICATION:  Pacemaker placement and rule out pneumothorax Pacemaker placement and rule out pneumothorax COMPARISON: 2020 FINDINGS: There is cardiomegaly. A left subclavian pacemaker is in place with the leads projected over the right atrium and right ventricle. There is no pneumothorax. There is vascular congestion with hazy perihilar densities predominantly in the periphery, concerning for mild CHF. Superimposed pneumonia is less likely. Left subclavian pacemaker in appropriate position without complication. Cardiomegaly with the perihilar/peripheral hazy opacities likely mild CHF. Pneumonia is not excluded. Xr Chest Portable    Result Date: 2020  Patient MRN:  [id number] : 1960 Age: 64 years Gender: Male Order Date:  2020 3:47 PM EXAM: XR CHEST PORTABLE COMPARISON: August 15, 2020 INDICATION:  chest pain chest pain FINDINGS: The heart is normal in size. No focal airspace opacity. There is no pleural effusion. There is no pneumothorax. No free air beneath the diaphragms. Electronic hardware overlies the chest.     No airspace opacities or pleural effusion. Pertinent Cardiac Testing:  Echocardiogram 20:    Findings      Left Ventricle   Left ventricle is moderately enlarged .   Ejection fraction is visually estimated at 20-25%. Atrial fibrillation may   affect the evaluation of LV systolic function.   Overall ejection fraction severely decreased .   Abnormal LV diastolic function with elevated filling pressures (E/e' >11).       Right Ventricle  [place] dilated right ventricle.   Right ventricle global systolic function is low normal . TAPSE 17 mm.      Left Atrium   The left atrium is severely dilated.   Interatrial septum appears intact.   No evidence of patent foramen ovale.      Right Atrium   Markedly enlarged right atrium size.      Mitral Valve   Normal mitral valve structure and function.   No evidence of mitral valve stenosis.   Moderate to severe mitral regurgitation with centrally directed jet.      Tricuspid Valve   The tricuspid valve appears structurally normal.   Moderate tricuspid regurgitation.  RVSP is 36 mmHg. Normal estimated PA systolic pressure.      Aortic Valve   Structurally normal aortic valve.   The aortic valve is trileaflet.   No hemodynamically significant aortic stenosis is present.   No evidence of aortic valve regurgitation.      Pulmonic Valve   Pulmonic valve is structurally normal.   Physiologic and/or trace pulmonic regurgitation present.   No evidence of pulmonic valve stenosis.     Pericardial Effusion   No evidence for hemodynamically significant pericardial effusion.      Pleural Effusion   No evidence of pleural effusion.      Aorta   Normal aortic root and ascending aorta.   Miscellaneous   Dilated Inferior Vena Cava.   Inferior Vena Cava, normal respiratory variation.      Conclusions      Summary   Left ventricle is moderately enlarged .   Ejection fraction is visually estimated at 20-25%. Atrial fibrillation may   affect the evaluation of LV systolic function.   Overall ejection fraction severely decreased .   Abnormal LV diastolic function with elevated filling pressures (E/e' >11).  The left atrium is severely dilated.   Mildly dilated right ventricle.   Right ventricle global systolic function is low normal . TAPSE 17 mm.   Moderate to severe mitral regurgitation with centrally directed jet.   No hemodynamically significant aortic stenosis is present.   Moderate tricuspid regurgitation.  RVSP is 36 mmHg.  Normal estimated PA systolic pressure.   No evidence for hemodynamically significant pericardial effusion.   No previous echo for comparison.      Signature      ----------------------------------------------------------------   Electronically signed by Elvis Roa MD(Interpreting   physician) on 08/16/2020 05:23 PM   ----------------------------------------------------------------     M-Mode/2D Measurements & Calculations      LV Diastolic    LV Systolic Dimension: 5.8   AV Cusp Separation: 1.9 cmLA   Dimension: 6.7  cm                           Dimension: 5.4 cmAO Root   cm              LV Volume Diastolic: 900. 6   Dimension: 3.2 cm   LV FS:13.4 %    ml   LV PW           LV Volume Systolic: 966.6 ml   Diastolic: 0.9  LV EDV/LV EDV Index: 233.6   cm              ml/102 ml/m^2LV ESV/LV ESV   RV Diastolic Dimension: 3.6   LV PW Systolic: Index: 926.2 VQ/09GC/ m^2    cm   1.2 cm          EF Calculated: 29.4 %   Septum          LV Mass Index: 114 l/min*m^2 LA/Aorta: 6.80   Diastolic: 0.9  LV Length: 9.1 cm            Ascending Aorta: 3.5 cm   cm                                           LA volume/Index: 182.3 ml   Septum          LVOT: 2.1 cm                 /95NV/E^7   Systolic: 1.1                                RA Area: 30.6 cm^2   cm   CO: 3.88 l/min                               IVC Expiration: 3.1 cm   CI: 1.69   l/m*m^2   LV Mass: 261.32   g     Doppler Measurements & Calculations      MV Peak E-Wave: 1.23 m/s   AV Peak Velocity:    LVOT Peak Velocity: 0.72                              1.16 m/s             m/s   MV Peak Gradient: 6 mmHg   AV Peak Gradient:    LVOT Mean Velocity: 0.5   MV Mean Gradient: 2.7 mmHg 5.34 mmHg            m/s   MV Mean Velocity: 0.75 m/s AV Mean Velocity:    LVOT Peak Gradient: 2.2   MV Deceleration Time:      0.76 m/s             mmHgLVOT Mean Gradient:   134.6 msec                 AV Mean Gradient:    1.2 mmHg   MV P1/2t: 65.5 msec        2.8 mmHg             Estimated RVSP: 36 mmHg   MVA by PHT:3.36 cm^2       AV VTI: 16.9 cm      Estimated RAP:8 mmHg   MV Area (continuity): 1.9  AV Area   cm^2                       (Continuity):2.58                              NI^7                 TM Velocity:2.64 m/s                                                   YL Gradient:27.77 mmHg   MR Velocity: 4.49 m/s      LVOT VTI: 12.6 cm    PV Peak Velocity: 0.55 m/s   MV DENNY PISA: 0.29 cm^2     IVRT: 62.3 msec      PV Peak Gradient: 1.21   MR VTI: 125.9 cm           Estimated PASP:      mmHg   Alias Velocity: 0.21       35.77 mmHg           PV Mean Velocity: 0.39 m/s   m/sPISA Radius: 1 cm                            PV Mean Gradient: 0.7 mmHg      PISA area: 6.28 cm^2MR                          ZG ED Velocity: 1.53 m/s   flow rate: 128.74 ml/sMR   volume:36.51 ml     Cardiac Catheterization 08/18/2020:   [address]  [facility] [place], [address] [facility]                             CARDIAC CATHETERIZATION     PATIENT NAME: Valente Parker                         RMV:        1960  MED REC NO:   [id number]                            GREGGZJZanesha Barton NO:   [id number]                           ADMIT DATE: 08/18/2020  PROVIDER: Sherri Vo MD     DATE OF PROCEDURE:  08/18/2020     LEFT HEART CATHETERIZATION     INDICATION:  New onset CHF and newly diagnosed cardiomyopathy with severe  systolic dysfunction.     PROCEDURE NOTE:  The patient was transferred from [facility]  today to undergo left heart catheterization due to newly diagnosed CHF  and cardiomyopathy with severe systolic dysfunction.  The patient was  brought to the cardiac cath lab in his usual fasting state.  Under  sterile condition and under local anesthetic and using conscious  sedation, a 6-Macedonian Terumo slender sheath was inserted into the right  radial artery.  The patient received 5000 units of intravenous heparin.    He also received 300 mcg of intraarterial nitroglycerin via the right  radial sheath.  Then I could not advance a J-tip wire across the distal  radial artery.  The patient received 2.5 mg of diluted verapamil and 200  mcg of intra-arterial nitroglycerin via the right radial sheath.  Then,  a Wholey wire was advanced to the ascending aorta with little difficulty  due to angulated right subclavian artery.  Then a 5-Cambodian TIG  diagnostic catheter was advanced to the ascending aorta and the left  main coronary artery was engaged and a coronary angiogram was done. This catheter was used to engage the right coronary artery and a  coronary angiogram was done.  This catheter was exchanged over the  guidewire to a 5-Cambodian pigtail which was advanced into the left  ventricle without difficulty.  The left ventricular end-diastolic  pressure was measured.  No left ventriculogram was done due to elevated  creatinine and due to known ejection fraction by echocardiogram.  At the  end of the procedure, the pigtail was pulled out.  The Terumo slender  sheath was pulled out and a Vasc Band was applied over the right radial  artery with good hemostasis.  The patient tolerated the procedure very  well and no complications were noted.  No significant blood loss  occurred during the procedure.     FINDINGS:  HEMODYNAMIC RESULTS:  1.  Aortic pressure 134/78 mmHg. 2.  Left ventricular end-diastolic pressure 23 mmHg.     CORONARY ANATOMY:  Left main:  It is a long and large artery with no angiographic stenosis  noted. LAD:  It is a large artery wrapping around the apex and giving rise to  two diagonal branches and a large first septal  and smaller  septal branches.  The first diagonal was small.  The second diagonal was  large and bifurcating.  No angiographic stenosis noted in the LAD or its  branches. Left circumflex:  It is a large artery giving rise to three obtuse  marginal branches.  The first and second obtuse marginal branches were  very small.  The third obtuse marginal branch was bifurcating and  tortuous.  No angiographic stenosis noted.   RCA:  It is a large dominant artery giving rise to a conus branch, SA  woody or AV woody branch, an RV marginal branch, a large PDA and a large  PLV branch.  There was questionable 50% discrete distal RCA stenosis  versus most likely overlap of the proximal segment of the PDA and of  the PLV branch.     IMPRESSION:  1.  Questionable 50% discrete distal RCA stenosis versus most likely  overlap of the proximal segment of PLV and PDA. 2.  Absence of angiographic stenosis in the left main, LAD or left  circumflex. 3.  Elevated LVEDP at 23 mmHg.     RECOMMENDATIONS:  1.  GDMT. 2.  Aggressive medical therapy.     PROCEDURE START TIME:  17:22 p.m.     PROCEDURE END TIME:  17:43 p.m.     CONTRAST VOLUME:  41 mL of Optiray.     FLUOROSCOPY TIME:  3.1 minutes.     CONSCIOUS SEDATION:  2 mg of Versed and 100 mcg of intravenous fentanyl.           Areli Rodriguez MD     LifeVest 8/21/20:        Telemetry8/27/2020: AV paced/a sensed-V sensed    EKG 8/25/20: NSR 69, old AMI, QTc 471 msec    Device Interrogation ( 08/27/2020 )  Make/Model Medtronic Evera  Mode AAIR--> DDDR 60/130  P wave: 4.3 mV  Impedance: 532 ohms   Threshold: 0.5 V @ 0.4 ms  RV R wave: 5.0 mV  Impedance: 532 ohms   Threshold: 0.5 V @ 0.4 ms  Pacing: A: 37.8%  RV: 0.1%    Battery Voltage/Longevity:  10+ years     Arrhythmias: none   Reprogramming included none   Overall device function is normal  All device programmable settings were evaluated per above and in the scanned document, along with iterative adjustments (capture thresholds) to assess and select the most appropriate final programming to provide for consistent delivery of the appropriate therapy and to verify function of the device. I have independently reviewed all of the ECGs and rhythm strips per above. I have personally reviewed the laboratory, cardiac diagnostic and radiographic testing as outlined above. I have reviewed previous records noted in [address]. Impression:    1. Sustained ventricular tachycardia  - Polymorphic ventricular tachycardia and ventricular fibrillation on 08/21/20 greater than 30 sec required WCD shock.   - No recurrent VT since.     2. ICD in situ   - DOI 08/26/2020    - Pool Giordano Rebeca   - Secondary prevention      3.  HFrEF   - Nonischemic cardiomyopathy.  - First diagnosed 08/16/2020  - Cardiac cath 8/20/2020 showed nonobstructive CAD. - LVEF 20-25% on TTE 08/16/20  - Compensated and euvolemic  - On GDMT:  Isordil, hydralazine, Toprol and Lasix. - Angioedema with ACE-I     4. Atrial flutter   - First seen 08/16/2020  - [id number] 3  (CHF, HTN, CAD)  - On Eliquis for stroke risk reduction  - Status post DC-CV on 08/19/2020  - Started on Amiodarone on 08/20/2020  - On Toprol XL for rate control.     5. Valvular heart disease   - Moderate to severe MR and moderate TR on TTE 8/16/20     6. CKD   Estimated Creatinine Clearance: 62 mL/min (A) (based on SCr of 1.6 mg/dL (H)).     7. Hypertension   - On  Isordil, hydralazine, Toprol and Lasix.     8. Coronary artery disease  - 50% discrete distal RCA stenosis versus most likely  overlap of the proximal segment of PLV and PDA.     9. Elevated LFT's      10. Tobacco abuse. Recommendations:    1. [id number] Brunson for discharge from EP perspective   2. Standard arm restrictions and wound care  3. Resume Eliquis tonight at 2100  4. Follow-up in device clinic in 2 weeks. Thank you for allowing me to participate in your patient's care. Discussed with Dr. Celestino Layton  Electronically signed by ROSALINDA Antoine CNP on 8/27/2020 at 10:23 AM  [address] [facility]        Addendum    I personally saw, examined and provided care for the patient. Radiographs, labs and medication list were reviewed by me independently. The case was discussed in detail and plans for care were established. Review of Nurse Practitioner documentation was conducted and revisions were made as appropriate. I agree with the above documented exam, problem list and plan of care.     - Normal device function  - Xray shows normal position of leads  - - 2 week Ep follow up        Otoniel Haynes M.D  [id number] [facility] Electrophysiology

## 2020-08-31 ENCOUNTER — TELEPHONE (OUTPATIENT)
Dept: CARDIOLOGY | Age: 60
End: 2020-08-31

## 2020-08-31 NOTE — TELEPHONE ENCOUNTER
I called Nikia Gonsales to see how he's doing since his ICD Insertion on 8/27/20. There was no answer & no ability to leave a message as his voice mailbox has not been set up.

## 2020-09-01 ENCOUNTER — TELEPHONE (OUTPATIENT)
Dept: ADMINISTRATIVE | Age: 60
End: 2020-09-01

## 2020-09-01 NOTE — TELEPHONE ENCOUNTER
ICD INSERTION FU    Hasmukh Lui returned my ICD FU call. He states he's doing \"great\" since his ICD Insertion on 8/26/20. His aquacel dressing remains intact. He denies any fevers, redness, bleeding, drainage, or swelling at ICD incision site. I reminded him to remove the aquacel dsg on Thursday 9/4/20, not to touch the steri strips,  to continue to wear his sling at night for 4 weeks; along with not abducting the L arm above the shoulder.  He's also aware of his follow up appt at the [address] [place] [address] Drive on 9/9/20 at 1:00 pm.    ARRHYTHMIA EDUCATION    Also discussed information relating to Atrial Flutter (AFL)    Discussed the following topics:  AFL,  AFL & Stroke Prevention,  AFL Personal Risk factor Reduction,  AFL Treatment Adherence, & the importance of compliance      Patient's Risk Factors /  Risk Factor Reduction Measures:      1)  High Blood Pressure --  Take BP as prescribed / Reduce salt intake 2 gms daily /  Blood pressure: target of <130/80mmHg                                                            2)  CAD -- Follow Heart Healthy diet / Medication compliance / follow up w/ cardiologist regularly   3)  Heart Failure -- Take medications as prescribed, Daily weights, Low sodium diet          4)  Obstructive Sleep Apnea  --Sleep Study to evaluate for and or treat with CPAP if AHI >30/hour / CPAP compliance     5)  Obesity -- Weight loss program  / Moderate exercise program-30min 3-4x/week of at least moderate cardiopulmonary exercise up to 250 min/wk  to maintain BMI <27.   6)  Diabetes -- Stringent blood sugar management / medication compliance  7)  Chronic Lung Disease -- NO smoking / Take inhalers as prescribed /  follow up w/ pulmonologist regularly   8)  Excessive Alcohol or stimulant use --Avoid alcohol & stimulants   9)   Hyperlipidemia -- Follow a Low cholesterol  heart healthy diet / Adherence to statin medications   10) Smoking --Abstinence from tobacco produc        Medications Reviewed: Amiodarone, QT Prolonging Medications, & Eliquis    Handouts on above topics are going to be mailed to pt today. Time spent with patient 20 minutes. Michael Lawson

## 2020-09-09 ENCOUNTER — OFFICE VISIT (OUTPATIENT)
Dept: CARDIOLOGY CLINIC | Age: 60
End: 2020-09-09
Payer: OTHER GOVERNMENT

## 2020-09-09 ENCOUNTER — NURSE ONLY (OUTPATIENT)
Dept: NON INVASIVE DIAGNOSTICS | Age: 60
End: 2020-09-09
Payer: OTHER GOVERNMENT

## 2020-09-09 VITALS
HEART RATE: 62 BPM | DIASTOLIC BLOOD PRESSURE: 72 MMHG | RESPIRATION RATE: 16 BRPM | WEIGHT: 223.6 LBS | HEIGHT: 72 IN | OXYGEN SATURATION: 98 % | BODY MASS INDEX: 30.28 KG/M2 | SYSTOLIC BLOOD PRESSURE: 104 MMHG

## 2020-09-09 VITALS — TEMPERATURE: 98.2 F

## 2020-09-09 PROCEDURE — 93283 PRGRMG EVAL IMPLANTABLE DFB: CPT | Performed by: INTERNAL MEDICINE

## 2020-09-09 PROCEDURE — 99214 OFFICE O/P EST MOD 30 MIN: CPT | Performed by: NURSE PRACTITIONER

## 2020-09-09 RX ORDER — SPIRONOLACTONE 25 MG/1
25 TABLET ORAL DAILY
Qty: 90 TABLET | Refills: 1 | Status: SHIPPED
Start: 2020-09-09 | End: 2021-05-28 | Stop reason: SDUPTHER

## 2020-09-09 RX ORDER — METOPROLOL SUCCINATE 25 MG/1
75 TABLET, EXTENDED RELEASE ORAL NIGHTLY
Qty: 90 TABLET | Refills: 0 | Status: SHIPPED
Start: 2020-09-09 | End: 2020-11-12

## 2020-09-09 RX ORDER — AMIODARONE HYDROCHLORIDE 200 MG/1
200 TABLET ORAL DAILY
Qty: 90 TABLET | Refills: 1 | Status: SHIPPED | OUTPATIENT
Start: 2020-09-09 | End: 2020-09-25 | Stop reason: SDUPTHER

## 2020-09-09 NOTE — PATIENT INSTRUCTIONS
Continue arm restrictions until 9-  You may shower starting today    Call if any signs or symptoms of infection [phone] ext: [phone]  Fevers, chills, redness, swelling or drainage. Hook up home  Monitor  [employer] Stay connected: [phone]     If you receive a shock    1 shock and you feel fine send a home transmission and call the office [phone]   1 shock and you feel poorly after the shock go to the emergency room and do not drive. Multiple shocks report to the emergency room and do no drive.

## 2020-09-09 NOTE — PROGRESS NOTES
See Pace[employer] Ray report. Wound and ICD check after implant. Reviewed medications in office with Dr. Khadar Foster. Amiodarone 200 mg daily sent to pharmacy. Needs LFTs in 1 week. Pt verbalized understanding.      Silva Anderson RN, BSN  [facility]

## 2020-09-09 NOTE — PROGRESS NOTES
artery 08/18/2020    Atrial flutter with rapid ventricular response (HCC)     Acidosis     Elevated LFTs     New onset atrial flutter ([facility] 08/15/2020    Acute decompensated heart failure ([facility] 08/15/2020    New onset of congestive heart failure ([facility] 08/15/2020    Essential hypertension 09/15/2016    Acute blood loss as cause of postoperative anemia 09/15/2016    Osteoarthritis of multiple joints 09/15/2016    Primary osteoarthritis of left hip 09/13/2016       Past Medical History:   Diagnosis Date    Arthritis     CHF (congestive heart failure) ([facility]     Hypertension     New onset atrial flutter (HCC)     Nonischemic cardiomyopathy ([facility] 08/2020    Preoperative clearance 08/18/2016    medical clearance on paper chart from Dr. Rosey Peabody.  Gerardo    Seasonal allergies     [facility] 08/[facility]       Past Surgical History:   Procedure Laterality Date    BACK SURGERY      CARDIAC DEFIBRILLATOR PLACEMENT  08/27/2020    Dual ICD    (medtronic)    Dr. Silke Vazquez, [address]      HIP SURGERY  09/13/2016    left total; hip arthroplasty       Allergies   Allergen Reactions    Ace Inhibitors Swelling    Lisinopril Swelling     Angioedema       Outpatient Medications Marked as Taking for the 9/9/20 encounter (Office Visit) with ROSALINDA Oconnell CNP   Medication Sig Dispense Refill    spironolactone (ALDACTONE) 25 MG tablet Take 1 tablet by mouth daily 90 tablet 1    metoprolol succinate (TOPROL XL) 25 MG extended release tablet Take 3 tablets by mouth nightly 90 tablet 0    isosorbide dinitrate (ISORDIL) 20 MG tablet Take 1 tablet by mouth 3 times daily 90 tablet 0    apixaban (ELIQUIS) 5 MG TABS tablet Take 1 tablet by mouth 2 times daily 60 tablet 0    hydrALAZINE (APRESOLINE) 25 MG tablet Take 1 tablet by mouth every 8 hours 90 tablet 3    furosemide (LASIX) 40 MG tablet Take 1 tablet by mouth daily 30 tablet 0       Guideline directed medical/device therapy:  ARNI/ACE I/ARB: No - hx of angioedema. On hydralazine/isordil. Beta blocker: Yes  Aldosterone antagonist:  No  ICD/CRT-P/-D:  ICD  QRS interval on recent ECG (personally reviewed/interpreted): <120 ms  Percentage RV pacing (personally reviewed/interpreted): %/NA      Review of Systems:   Cardiac: As per HPI  General: No fever, chills, rigors  Pulmonary: As per HPI  HEENT: No visual disturbances, difficult swallowing  GI: No nausea, vomiting, abdominal pain  : No dysuria or hematuria  Endocrine: No thyroid disease or diabetes  Musculoskeletal: HERRMANN x 4, no focal motor deficits  Skin: Intact, no rashes  Neuro/Psych: No headache or seizures        Weights: Wt Readings from Last 3 Encounters:   09/09/20 223 lb 9.6 oz (101.4 kg)   08/26/20 226 lb (102.5 kg)   08/20/20 228 lb (103.4 kg)         Physical Examination:     /72 (Site: Right Upper Arm, Position: Sitting, Cuff Size: Medium Adult)   Pulse 62   Resp 16   Ht 6' (1.829 m)   Wt 223 lb 9.6 oz (101.4 kg)   SpO2 98%   BMI 30.33 kg/m²     CONSTITUTIONAL: Alert and oriented times 3, no acute distress and cooperative to examination with proper mood and affect. SKIN: Skin color, texture, turgor normal. No rashes or lesions. LYMPH: no cervical nodes, no inguinal nodes  HEENT: Head is normocephalic, atraumatic. EOMI, PERRLA. NECK: Supple, symmetrical, trachea midline, no adenopathy, thyroid symmetric, not enlarged and no tenderness, skin normal. No jvd/hjr. CHEST/LUNGS: chest symmetric with normal A/P diameter, normal respiratory rate and rhythm, lungs clear to auscultation without wheezes, rales or rhonchi. No accessory muscle use. Scars None   CARDIOVASCULAR: Heart sounds are normal.  Regular rate and rhythm without murmur, gallop or rub. Normal S1 and S2. Carotid and femoral pulses 2+/4 and equal bilaterally. ABDOMEN: Normal shape. No and Laparoscopic scar(s) present. Normal bowel sounds. No bruits.  soft, nondistended, no masses or organomegaly. no evidence of hernia. Percussion: Normal without hepatosplenomegally. Tenderness: absent. RECTAL: deferred, not clinically indicated  NEUROLOGIC: There are no focalizing motor or sensory deficits. CN II-XII are grossly intact. Cathye Fern EXTREMITIES: no cyanosis, no clubbing and no edema. Warm and well perfused. All the following diagnostics were personally reviewed and interpreted by me. LAB DATA:     8/27/2020 05:40   Sodium 137   Potassium 4.1   Chloride 99   CO2 24   BUN 16   Creatinine 1.4 (H)   Anion Gap 14   GFR Non- >60   GFR African American >60   Magnesium 2.0   Glucose 95   Calcium 8.9   WBC 4.3 (L)   RBC 4.12   Hemoglobin Quant 14.1   Hematocrit 42.7   .6 (H)   MCH 34.2   MCHC 33.0   MPV 11.3   RDW 13.0   Platelet Count 824       IMAGING:    CXR (8/27/2020)  FINDINGS:   Left-sided pacemaker is stable in appearance. There is no evidence of pneumothorax or other new abnormal finding. There is a stable cardiac mediastinal silhouette. Impression   No new abnormal findings. No significant interval change. CARDIAC TESTING:    [facility] (8/18/2020)  FINDINGS:  1. Aortic pressure 134/78 mmHg. 2.  Left ventricular end-diastolic pressure [id number]. CORONARY ANATOMY:  Left main:  It is a long and large artery with no angiographic stenosis noted. LAD:  It is a large artery wrapping around the apex and giving rise to two diagonal branches and a large first septal  and smaller  septal branches. The first diagonal was small. The second diagonal was large and bifurcating. No angiographic stenosis noted in the LAD or its  branches. Left circumflex: It is a large artery giving rise to three obtuse marginal branches. The first and second obtuse marginal branches were very small. The third obtuse marginal branch was bifurcating and tortuous. No angiographic stenosis noted.   RCA:  It is a large dominant artery giving rise to a conus branch, SA woody or

## 2020-09-09 NOTE — PATIENT INSTRUCTIONS
1. Start spironolactone 25 mg daily    2. Continue rest of current cardiac medications, take your metoprolol (Toprol) 75 mg nightly    3. Get blood work in one week at [facility] ( you do not need to be fasting)    4. Referral to cardiac rehab    5. Referral for sleep study     6. Follow up with Dr. Anitra Lepe in one month     7. Weigh yourself daily    -Stay Hydrated    -Diet should sodium restricted to 2 grams    -Again watch your daily weight trends and if you gain water weight please follow below instructions.    -If you gain 3-5 pounds in 2-3 days OR notice that you are retaining fluid in anyway just like you did before then take an extra dose of your water pill (furosemide/Lasix) every day until you lose the weight or feel better.     -If you notice that you have taken more than 3 extra doses in 1 week then please call and let us know. -If at any time you feel that you are retaining fluid, your medications are not working, or you feel ill in anyway, then please call us for either same day appointment or the next day, and for instructions. Our goal is to keep you out of the emergency room and the hospital and we have ways to do it. You just need to call us in a timely manner.     -If you become sick for other reasons, and notice that you are not urinating as much, the urine is very dark, you have significant diarrhea or vomiting, then please DO NOT take your water pill and CALL US immediately.

## 2020-09-25 RX ORDER — ISOSORBIDE DINITRATE 20 MG/1
20 TABLET ORAL 3 TIMES DAILY
Qty: 90 TABLET | Refills: 0 | Status: SHIPPED
Start: 2020-09-25 | End: 2020-10-30 | Stop reason: SDUPTHER

## 2020-09-25 RX ORDER — FUROSEMIDE 40 MG/1
40 TABLET ORAL DAILY
Qty: 30 TABLET | Refills: 1 | Status: SHIPPED
Start: 2020-09-25 | End: 2020-12-01 | Stop reason: SDUPTHER

## 2020-09-25 RX ORDER — AMIODARONE HYDROCHLORIDE 200 MG/1
200 TABLET ORAL DAILY
Qty: 90 TABLET | Refills: 1 | Status: SHIPPED
Start: 2020-09-25 | End: 2020-12-07 | Stop reason: SDUPTHER

## 2020-10-20 ENCOUNTER — TELEPHONE (OUTPATIENT)
Dept: CARDIOLOGY CLINIC | Age: 60
End: 2020-10-20

## 2020-10-20 NOTE — TELEPHONE ENCOUNTER
Called pt to see when he intends to have the sleep study. He was called and didn't respond to the sleep study. He does not have a voicemail set up so I an just sending a letter today.

## 2020-10-30 RX ORDER — ISOSORBIDE DINITRATE 20 MG/1
20 TABLET ORAL 3 TIMES DAILY
Qty: 90 TABLET | Refills: 0 | Status: SHIPPED
Start: 2020-10-30 | End: 2020-12-01 | Stop reason: SDUPTHER

## 2020-11-12 RX ORDER — METOPROLOL SUCCINATE 25 MG/1
TABLET, EXTENDED RELEASE ORAL
Qty: 90 TABLET | Refills: 11 | Status: SHIPPED
Start: 2020-11-12 | End: 2020-12-07 | Stop reason: ALTCHOICE

## 2020-12-01 RX ORDER — FUROSEMIDE 40 MG/1
40 TABLET ORAL DAILY
Qty: 30 TABLET | Refills: 3 | Status: SHIPPED
Start: 2020-12-01 | End: 2021-05-27 | Stop reason: SDUPTHER

## 2020-12-01 RX ORDER — ISOSORBIDE DINITRATE 20 MG/1
20 TABLET ORAL 3 TIMES DAILY
Qty: 90 TABLET | Refills: 3 | Status: SHIPPED
Start: 2020-12-01 | End: 2021-05-17

## 2020-12-07 ENCOUNTER — OFFICE VISIT (OUTPATIENT)
Dept: CARDIOLOGY CLINIC | Age: 60
End: 2020-12-07
Payer: OTHER GOVERNMENT

## 2020-12-07 VITALS
RESPIRATION RATE: 16 BRPM | WEIGHT: 220 LBS | HEART RATE: 82 BPM | BODY MASS INDEX: 29.8 KG/M2 | HEIGHT: 72 IN | DIASTOLIC BLOOD PRESSURE: 74 MMHG | SYSTOLIC BLOOD PRESSURE: 122 MMHG

## 2020-12-07 PROCEDURE — 99214 OFFICE O/P EST MOD 30 MIN: CPT | Performed by: INTERNAL MEDICINE

## 2020-12-07 PROCEDURE — 93000 ELECTROCARDIOGRAM COMPLETE: CPT | Performed by: INTERNAL MEDICINE

## 2020-12-07 RX ORDER — METOPROLOL SUCCINATE 50 MG
100 TABLET, EXTENDED RELEASE 24 HR ORAL DAILY
Qty: 90 TABLET | Refills: 3
Start: 2020-12-07 | End: 2021-06-10

## 2020-12-07 RX ORDER — AMIODARONE HYDROCHLORIDE 200 MG/1
200 TABLET ORAL DAILY
Qty: 90 TABLET | Refills: 3 | Status: SHIPPED
Start: 2020-12-07 | End: 2021-10-19 | Stop reason: SDUPTHER

## 2020-12-07 NOTE — PROGRESS NOTES
OUTPATIENT CARDIOLOGY FOLLOW-UP    Name: Erin Bhatti    Age: 64 y.o. Primary Care Physician: Rafi Navas MD    Date of Service: 12/7/2020    Chief Complaint:   Chief Complaint   Patient presents with    Congestive Heart Failure       Interim History:   Mr. Arpan Crespo is a 72-year-old -American gentleman with history of chronic heart failure with reduced ejection fraction, nonischemic cardiomyopathy, nonobstructive CAD, atrial flutter, CKD, hypertension and obesity presented for hospitalization. He was recently hospitalized following LifeVest defibrillation secondary to sustained VT without loss of consciousness and was shocked x1 for LifeVest.  He was referred to emergency room for evaluation and subsequently admitted. He was evaluated by EP service and underwent ICD implantation on 8/26/2020. He has been on amiodarone since then but recently he quit taking on his own due to high price. He denies any recent ICD shocks. He could not tolerate ACE or ARB due to angioedema. Following hospital discharge he was evaluated in the heart failure clinic on 9/9/2020, since he was seen in office, he denies any further hospitalizations or ER visits. He is compliant with medications, as well as salt and fluid intake. He does not take any over-the-counter arthritis medications. No new cardiac complaints since last cardiology evaluation. He denies recent chest pain, SOB, palpitations, lightheadedness, dizziness, syncope, PND, or orthopnea. SR on EKG.     Review of Systems:   Cardiac: As per HPI  General: No fever, chills  Pulmonary: As per HPI  HEENT: No visual disturbances, difficult swallowing  GI: No nausea, vomiting  Endocrine: No thyroid disease or DM  Musculoskeletal: HERRMANN x 4, no focal motor deficits  Skin: Intact, no rashes  Neuro/Psych: No headache or seizures    Past Medical History:  Past Medical History:   Diagnosis Date    Arthritis     [facility]     Hypertension  New onset atrial flutter (HCC)     Nonischemic cardiomyopathy ([facility] 2020    Preoperative clearance 2016    medical clearance on paper chart from Dr. Andreea Ruth.  Gerardo    Seasonal allergies     VF (ventricular fibrillation) ([facility] 2020       Past Surgical History:  Past Surgical History:   Procedure Laterality Date    BACK SURGERY      CARDIAC DEFIBRILLATOR PLACEMENT  2020    Dual ICD    (medtronic)    Dr. Santo Roque, [address]      HIP SURGERY  2016    left total; hip arthroplasty       Family History:  Family History   Problem Relation Age of Onset    Diabetes Brother     High Blood Pressure Paternal Aunt     High Blood Pressure Paternal Uncle        Social History:  Social History     Socioeconomic History    Marital status:      Spouse name: Not on file    Number of children: Not on file    Years of education: Not on file    Highest education level: Not on file   Occupational History    Not on file   Social Needs    Financial resource strain: Not on file    Food insecurity     Worry: Not on file     Inability: Not on file    Transportation needs     Medical: Not on file     Non-medical: Not on file   Tobacco Use    Smoking status: Former Smoker     Packs/day: 0.25     Years: 15.00     Pack years: 3.75     Types: Cigarettes     Last attempt to quit: 8/15/2020     Years since quittin.3    Smokeless tobacco: Former User     Quit date: 8/15/2020   Substance and Sexual Activity    Alcohol use: Not Currently     Alcohol/week: 3.0 standard drinks     Types: 3 Shots of liquor per week    Drug use: No    Sexual activity: Not on file   Lifestyle    Physical activity     Days per week: Not on file     Minutes per session: Not on file    Stress: Not on file   Relationships    Social connections     Talks on phone: Not on file     Gets together: Not on file     Attends Christian service: Not on file     Active member of club or organization: Not on file     Attends meetings of clubs or organizations: Not on file     Relationship status: Not on file    Intimate partner violence     Fear of current or ex partner: Not on file     Emotionally abused: Not on file     Physically abused: Not on file     Forced sexual activity: Not on file   Other Topics Concern    Not on file   Social History Narrative    Caffiene free products       Allergies: Allergies   Allergen Reactions    Ace Inhibitors Swelling    Lisinopril Swelling     Angioedema       Current Medications:  Current Outpatient Medications   Medication Sig Dispense Refill    isosorbide dinitrate (ISORDIL) 20 MG tablet Take 1 tablet by mouth 3 times daily 90 tablet 3    furosemide (LASIX) 40 MG tablet Take 1 tablet by mouth daily 30 tablet 3    metoprolol succinate (TOPROL XL) 25 MG extended release tablet TAKE 3 TABLETS BY MOUTH EVERY NIGHT 90 tablet 11    apixaban (ELIQUIS) 5 MG TABS tablet Take 1 tablet by mouth 2 times daily 60 tablet 3    spironolactone (ALDACTONE) 25 MG tablet Take 1 tablet by mouth daily 90 tablet 1    hydrALAZINE (APRESOLINE) 25 MG tablet Take 1 tablet by mouth every 8 hours 90 tablet 3    amiodarone (CORDARONE) 200 MG tablet Take 1 tablet by mouth daily (Patient not taking: Reported on 12/7/2020) 90 tablet 1     No current facility-administered medications for this visit. Physical Exam:  /74   Pulse 82   Resp 16   Ht 6' (1.829 m)   Wt 220 lb (99.8 kg)   BMI 29.84 kg/m²   Wt Readings from Last 3 Encounters:   12/07/20 220 lb (99.8 kg)   09/09/20 223 lb 9.6 oz (101.4 kg)   08/26/20 226 lb (102.5 kg)     Appearance: Awake, alert and oriented x 3, no acute respiratory distress  Skin: Intact, no rash  Head: Normocephalic, atraumatic  Eyes: EOMI, no conjunctival erythema  ENMT: No pharyngeal erythema, MMM, no rhinorrhea  Neck: Supple, no elevated JVP, no carotid bruits  Lungs: Clear to auscultation bilaterally.  No wheezes, rales, or rhonchi. Cardiac: Regular rate and rhythm, +S1S2, no murmurs apparent  Abdomen: Soft, nontender, +bowel sounds  Extremities: Moves all extremities x 4, no lower extremity edema  Neurologic: No focal motor deficits apparent, normal mood and affect, alert and oriented x 3  Peripheral Pulses: Intact posterior tibial pulses bilaterally    Laboratory Tests:  Lab Results   Component Value Date    CREATININE 1.4 (H) 08/27/2020    BUN 16 08/27/2020     08/27/2020    K 4.1 08/27/2020    CL 99 08/27/2020    CO2 24 08/27/2020     Lab Results   Component Value Date    MG 2.0 08/27/2020     Lab Results   Component Value Date    WBC 4.3 (L) 08/27/2020    HGB 14.1 08/27/2020    HCT 42.7 08/27/2020    .6 (H) 08/27/2020     08/27/2020     Lab Results   Component Value Date    ALT 88 (H) 08/25/2020    AST 56 (H) 08/25/2020    ALKPHOS 46 08/25/2020    BILITOT 0.9 08/25/2020     Lab Results   Component Value Date    TROPONINI <0.01 08/25/2020    TROPONINI 0.01 08/16/2020    TROPONINI <0.01 08/15/2020     Lab Results   Component Value Date    INR 1.4 08/25/2020    INR 1.1 08/15/2020    PROTIME 15.4 (H) 08/25/2020    PROTIME 12.8 (H) 08/15/2020     Lab Results   Component Value Date    TSH 3.300 08/15/2020     Lab Results   Component Value Date    LABA1C 4.6 08/16/2020     No results found for: EAG  No results found for: CHOL  No results found for: TRIG  Lab Results   Component Value Date    HDL 57 08/16/2020     Lab Results   Component Value Date    LDLCALC 83 08/16/2020     Lab Results   Component Value Date    LABVLDL 15 08/16/2020     No results found for: CHOLHDLRATIO    Cardiac Tests:  ECG: Normal sinus rhythm, T wave changes suggestive of anterior wall ischemia, abnormal EKG. [facility] (8/18/2020)  FINDINGS:  1.  Aortic pressure 134/78 mmHg. 2.  Left ventricular end-diastolic pressure [id number]. CORONARY ANATOMY:  Left main:  It is a long and large artery with no angiographic stenosis noted.   LAD:  It is a large artery wrapping around the apex and giving rise to two diagonal branches and a large first septal  and smaller  septal branches.  The first diagonal was small. The second diagonal was large and bifurcating.  No angiographic stenosis noted in the LAD or its  branches. Left circumflex:  It is a large artery giving rise to three obtuse marginal branches.  The first and second obtuse marginal branches were very small.  The third obtuse marginal branch was bifurcating and tortuous.  No angiographic stenosis noted. RCA:  It is a large dominant artery giving rise to a conus branch, SA woody or AV woody branch, an RV marginal branch, a large PDA and a large  PLV branch.  There was questionable 50% discrete distal RCA stenosis versus most likely overlap of the proximal segment of the PDA and of the PLV branch. IMPRESSION:  1.  Questionable 50% discrete distal RCA stenosis versus most likely overlap of the proximal segment of PLV and PDA. 2.  Absence of angiographic stenosis in the left main, LAD or left circumflex. 3.  Elevated LVEDP at 23 mmHg.     TTE (8/16/2020, Dr. Edmar Long)   Summary  Left ventricle is moderately enlarged . Ejection fraction is visually estimated at 20-25%. Atrial fibrillation may affect the evaluation of LV systolic function. Overall ejection fraction severely decreased . Abnormal LV diastolic function with elevated filling pressures (E/e' >11). The left atrium is severely dilated. Mildly dilated right ventricle. Right ventricle global systolic function is low normal . TAPSE 17 mm. Moderate to severe mitral regurgitation with centrally directed jet. No hemodynamically significant aortic stenosis is present. Moderate tricuspid regurgitation. RVSP is 36 mmHg. Normal estimated PA systolic pressure. No evidence for hemodynamically significant pericardial effusion. No previous echo for comparison.     The 10-year ASCVD risk score (Hugo Chacon., et al., 2013) is: 10.2% Values used to calculate the score:      Age: 64 years      Sex: Male      Is Non- : Yes      Diabetic: No      Tobacco smoker: No      Systolic Blood Pressure: 440 mmHg      Is BP treated: Yes      HDL Cholesterol: 57 mg/dL      Total Cholesterol: 155 mg/dL        ASSESSMENT:  · Chronic heart failure with reduced ejection fraction, euvolemic and hemodynamically stable  · ACC/AHA stage C.,  NYHA functional class I  · Nonischemic dilated cardiomyopathy with an EF of 20 to 25%  · History of sustained VT, status post LifeVest discharge status post ICD implantation-8/26/2020  · Alcohol use disorder  · Atrial flutter status post cardioversion remains in sinus rhythm  · On Eliquis for anticoagulation  · VHD: Moderate to severe MR  · Hypertension, well controlled  · CKD  · Obesity with possible obstructive sleep apnea  · Former smoker  · History of angioedema secondary to ACE inhibitor and ARB therapy    Plan:   · We will schedule for a limited echo to assess LV function. · He is not on guideline directed medical therapy due to intolerance/side effects to ACE and ARBs. We will increase Toprol- mg p.o. daily. Continue current dose of hydralazine and isosorbide. · He was advised to restart on amiodarone for rhythm control and follow-up with Dr. Shaun Mcdonough for ICD evaluation/VT as well for A. Flutter. · He was advised to cut down alcohol use completely. · Continue rest of the current medications including Eliquis for anticoagulation  · Follow-up with me in 3 months. The patient's current medication list, allergies, problem list and results of all previously ordered testing were reviewed at today's visit.   Vero Zavala MD  [facility]) Cardiology

## 2020-12-07 NOTE — PATIENT INSTRUCTIONS
· We will schedule for a limited echo to assess LV function. · He is not on guideline directed medical therapy due to intolerance/side effects to ACE and ARBs. We will increase Toprol- mg p.o. daily. Continue current dose of hydralazine and isosorbide. · He was advised to restart on amiodarone for rhythm control and follow-up with Dr. Ariadna Lim for ICD evaluation as well for A. Fib. · He was advised to cut down alcohol use completely. · Continue rest of the current medications including Eliquis for anticoagulation  · Follow-up with me in 3 months.

## 2020-12-08 ENCOUNTER — NURSE ONLY (OUTPATIENT)
Dept: NON INVASIVE DIAGNOSTICS | Age: 60
End: 2020-12-08
Payer: OTHER GOVERNMENT

## 2020-12-08 PROCEDURE — 93297 REM INTERROG DEV EVAL ICPMS: CPT | Performed by: INTERNAL MEDICINE

## 2020-12-08 PROCEDURE — 93295 DEV INTERROG REMOTE 1/2/MLT: CPT | Performed by: INTERNAL MEDICINE

## 2020-12-08 PROCEDURE — 93296 REM INTERROG EVL PM/IDS: CPT | Performed by: INTERNAL MEDICINE

## 2020-12-08 NOTE — PROGRESS NOTES
See [employer] report. Remote monitoring reviewed over a 90 day period. End of 90 day monitoring period date of service 12.8.2020.

## 2020-12-09 ENCOUNTER — NURSE ONLY (OUTPATIENT)
Dept: NON INVASIVE DIAGNOSTICS | Age: 60
End: 2020-12-09
Payer: OTHER GOVERNMENT

## 2020-12-09 VITALS — TEMPERATURE: 96.9 F

## 2020-12-09 PROCEDURE — 93283 PRGRMG EVAL IMPLANTABLE DFB: CPT | Performed by: INTERNAL MEDICINE

## 2021-01-18 ENCOUNTER — TELEPHONE (OUTPATIENT)
Dept: CARDIOLOGY | Age: 61
End: 2021-01-18

## 2021-01-18 NOTE — TELEPHONE ENCOUNTER
CALLED PATIENT TO RESCHEDULE ECHO THAT WAS SCHEDULED FOR THIS AFTERNOON. WASN'T ABLE TO LEAVE MESSAGE. NO VOICE MAIL. PATIENT WAS A NO SHOW.     Electronically signed by Catrachita Palomino on 1/18/2021 at 2:27 PM

## 2021-02-02 NOTE — DISCHARGE SUMMARY
Discharge Summary    Ron Newsome  :  1960  MRN:  [id number]    Admit date:  2020  Discharge date:  2020 11:26 AM    Admitting Physician:  Yunier Mi MD    Discharge Diagnoses:    Patient Active Problem List    Diagnosis Date Noted    Ventricular tachycardia ([address] 2020    Defibrillator discharge     Atrial flutter ([address]     Nonischemic cardiomyopathy ([address]     Acute diastolic CHF (congestive heart failure) ([address] 2020    CAD in native artery 2020    Atrial flutter with rapid ventricular response ([address]     Acidosis     Elevated LFTs     New onset atrial flutter ([address] 08/15/2020    Acute decompensated heart failure ([address] 08/15/2020    New onset of congestive heart failure ([address] 08/15/2020    Essential hypertension 09/15/2016    Acute blood loss as cause of postoperative anemia 09/15/2016    Osteoarthritis of multiple joints 09/15/2016    Primary osteoarthritis of left hip 2016       Past Medical Hx :   Past Medical History:   Diagnosis Date    Arthritis     CHF (congestive heart failure) ([address]     Hypertension     New onset atrial flutter (HCC)     Preoperative clearance 2016    medical clearance on paper chart from Dr. Kyle Morocho.  Gerardo    Seasonal allergies        Past Surgical Hx :   Past Surgical History:   Procedure Laterality Date    BACK SURGERY      DILATATION, ESOPHAGUS      HERNIA REPAIR      HIP SURGERY  2016    left total; hip arthroplasty       Admission Condition:  poor    Discharged Condition:  good    Labs:  CBC:   Lab Results   Component Value Date    WBC 4.3 2020    RBC 4.12 2020    HGB 14.1 2020    HCT 42.7 2020    .6 2020    MCH 34.2 2020    MCHC 33.0 2020    RDW 13.0 2020     2020    MPV 11.3 2020     CMP:    Lab Results   Component Value Date     2020    K 4.1 2020    K 4.2 2020    CL 99 2020    CO2 24 2020    BUN 16 2020    CREATININE 1.4 2020    GFRAA >60 2020    LABGLOM >60 2020    GLUCOSE 95 2020    PROT 6.2 2020    LABALBU 3.8 2020    CALCIUM 8.9 2020    BILITOT 0.9 2020    ALKPHOS 46 2020    AST 56 2020    ALT 88 2020        Radiology Results: Xr Chest Portable    Result Date: 2020  Patient MRN:  [id number] : 1960 Age: 64 years Gender: Male Order Date:  2020 11:39 AM EXAM: XR CHEST PORTABLE one image INDICATION:  Pacemaker placement and rule out pneumothorax Pacemaker placement and rule out pneumothorax COMPARISON: 2020 FINDINGS: There is cardiomegaly. A left subclavian pacemaker is in place with the leads projected over the right atrium and right ventricle. There is no pneumothorax. There is vascular congestion with hazy perihilar densities predominantly in the periphery, concerning for mild CHF. Superimposed pneumonia is less likely. Left subclavian pacemaker in appropriate position without complication. Cardiomegaly with the perihilar/peripheral hazy opacities likely mild CHF. Pneumonia is not excluded. Xr Chest Portable    Result Date: 2020  Patient MRN:  [id number] : 1960 Age: 64 years Gender: Male Order Date:  2020 3:47 PM EXAM: XR CHEST PORTABLE COMPARISON: August 15, 2020 INDICATION:  chest pain chest pain FINDINGS: The heart is normal in size. No focal airspace opacity. There is no pleural effusion. There is no pneumothorax. No free air beneath the diaphragms. Electronic hardware overlies the chest.     No airspace opacities or pleural effusion. Hospital Course:  CC: LifeVest discharge     HPI: This patient was recently diagnosed with nonischemic cardia myopathy, acute diastolic CHF, ventricular tachycardia, atrial flutter of new onset.   He was evaluated at [facility] and discharged with a LifeVest.  Apparently he was in some kind of stressful situation and he received a tenderness. Mouth: Lips and tongue appear normal. Dentition noted  Neck:  Symmetric. No adenopathy. Thyroid symmetric, normal size, without nodules. Trachea is midline. Chest: Even excursion ICD placement left upper chest arm in a sling  Lungs: Clear to auscultation. No rhonchi, crackles or rales. Heart: S1 > S2. Rhythm is regular and rate is normal. No gallop rub or murmur. Abdomen: Soft, mildly protuberant, non-tender. BS normal. No masses, organomegaly. Anatomic contours appear normal.  Extremities: No deformities, edema, or skin discoloration. Peripheral perfusion assessed in all exremities. No cyanosis  Musculoskeletal: No unusual pain or swelling. Muscular strength intact. Neuro:   · Cranial nerves grossly intact. · Motor: Strength grossly normal. No focal weakness. · Sensory: grossly normal to touch. · Cerebellar testing was grossly normal  Mental status: Awake, alert, cognizant of person, place, time.     Patient appears capable of directing self care   Mood: Normal and appropriate affect  Gait & balance: Independently ambulatory      Disposition: home    Patient Instructions:   REFER TO AVR or LILIA document  Patient's dosing schedule for the amiodarone is noted in his prescriptions he is to take the current dose until 8/31 and then switch to the lower dose  Signed:  Clovis Adorno of [employer] [facility]  [employer] Ger[employer]  8/27/2020, 11:26 AM English

## 2021-04-27 ENCOUNTER — TELEPHONE (OUTPATIENT)
Dept: NON INVASIVE DIAGNOSTICS | Age: 61
End: 2021-04-27

## 2021-05-17 RX ORDER — APIXABAN 5 MG/1
TABLET, FILM COATED ORAL
Qty: 60 TABLET | Refills: 11 | Status: SHIPPED
Start: 2021-05-17 | End: 2022-04-26

## 2021-05-17 RX ORDER — ISOSORBIDE DINITRATE 20 MG/1
TABLET ORAL
Qty: 90 TABLET | Refills: 11 | Status: SHIPPED
Start: 2021-05-17 | End: 2022-06-06

## 2021-05-27 ENCOUNTER — TELEPHONE (OUTPATIENT)
Dept: CARDIOLOGY CLINIC | Age: 61
End: 2021-05-27

## 2021-05-27 NOTE — TELEPHONE ENCOUNTER
Patient is scheduled for a hip surgery on 6/17 and needs cardiac clearance, last OV was December 2020, patient denies any chest pain, SOB, or palpitations, he is able to preform all daily activities without any cardiac issues.  Patient states that he has been off lasix and aldactone for a couple of weeks due to insurance and he is noticing some swelling in his feet, he is able to get both meds refilled at this time, please advise

## 2021-05-28 RX ORDER — FUROSEMIDE 40 MG/1
40 TABLET ORAL DAILY
Qty: 90 TABLET | Refills: 1 | Status: SHIPPED
Start: 2021-05-28 | End: 2021-11-22

## 2021-05-28 RX ORDER — SPIRONOLACTONE 25 MG/1
25 TABLET ORAL DAILY
Qty: 90 TABLET | Refills: 1 | Status: SHIPPED
Start: 2021-05-28 | End: 2021-11-22

## 2021-05-28 NOTE — TELEPHONE ENCOUNTER
Patient's revised cardiac risk index is low (1), class II risk, 0.9% risk of major perioperative cardiac events. He does not have any active cardiac symptoms such as chest pain, decompensated heart failure, uncontrolled arrhythmias or obstructive valve lesions. Patient has severe LV dysfunction secondary to nonischemic cardiomyopathy and also has an ICD. Continue current dose of Toprol-XL perioperatively. Use diuretics as needed. He is otherwise stable from the cardiology standpoint and proceed with the surgery as scheduled and no further cardiac testing is needed prior to his hip surgery.

## 2021-06-09 ENCOUNTER — PREP FOR PROCEDURE (OUTPATIENT)
Dept: SURGERY | Age: 61
End: 2021-06-09

## 2021-06-09 DIAGNOSIS — M16.11 PRIMARY LOCALIZED OSTEOARTHRITIS OF RIGHT HIP: Primary | ICD-10-CM

## 2021-06-09 RX ORDER — SODIUM CHLORIDE 9 MG/ML
25 INJECTION, SOLUTION INTRAVENOUS PRN
Status: CANCELLED | OUTPATIENT
Start: 2021-06-09

## 2021-06-09 RX ORDER — CELECOXIB 200 MG/1
200 CAPSULE ORAL ONCE
Status: CANCELLED | OUTPATIENT
Start: 2021-06-09 | End: 2021-06-09

## 2021-06-09 RX ORDER — SODIUM CHLORIDE, SODIUM LACTATE, POTASSIUM CHLORIDE, CALCIUM CHLORIDE 600; 310; 30; 20 MG/100ML; MG/100ML; MG/100ML; MG/100ML
INJECTION, SOLUTION INTRAVENOUS CONTINUOUS
Status: CANCELLED | OUTPATIENT
Start: 2021-06-09

## 2021-06-09 RX ORDER — SODIUM CHLORIDE 0.9 % (FLUSH) 0.9 %
5-40 SYRINGE (ML) INJECTION PRN
Status: CANCELLED | OUTPATIENT
Start: 2021-06-09

## 2021-06-09 RX ORDER — ACETAMINOPHEN 325 MG/1
1000 TABLET ORAL ONCE
Status: CANCELLED | OUTPATIENT
Start: 2021-06-09 | End: 2021-06-09

## 2021-06-09 RX ORDER — SODIUM CHLORIDE 0.9 % (FLUSH) 0.9 %
5-40 SYRINGE (ML) INJECTION EVERY 12 HOURS SCHEDULED
Status: CANCELLED | OUTPATIENT
Start: 2021-06-09

## 2021-06-10 ENCOUNTER — HOSPITAL ENCOUNTER (OUTPATIENT)
Dept: PREADMISSION TESTING | Age: 61
Discharge: HOME | End: 2021-06-10
Payer: COMMERCIAL

## 2021-06-10 ENCOUNTER — TELEPHONE (OUTPATIENT)
Dept: NON INVASIVE DIAGNOSTICS | Age: 61
End: 2021-06-10

## 2021-06-10 VITALS
RESPIRATION RATE: 20 BRPM | DIASTOLIC BLOOD PRESSURE: 79 MMHG | SYSTOLIC BLOOD PRESSURE: 131 MMHG | BODY MASS INDEX: 31.15 KG/M2 | HEIGHT: 72 IN | WEIGHT: 230 LBS | TEMPERATURE: 97.3 F | HEART RATE: 96 BPM | OXYGEN SATURATION: 97 %

## 2021-06-10 DIAGNOSIS — M16.11 PRIMARY LOCALIZED OSTEOARTHRITIS OF RIGHT HIP: ICD-10-CM

## 2021-06-10 LAB
ANION GAP SERPL CALCULATED.3IONS-SCNC: 9 MMOL/L (ref 7–16)
BUN BLDV-MCNC: 17 MG/DL (ref 6–20)
CALCIUM SERPL-MCNC: 9.1 MG/DL (ref 8.6–10.2)
CHLORIDE BLD-SCNC: 99 MMOL/L (ref 98–107)
CO2: 28 MMOL/L (ref 22–29)
CREAT SERPL-MCNC: 1.4 MG/DL (ref 0.7–1.2)
EKG ATRIAL RATE: 67 BPM
EKG P AXIS: 5 DEGREES
EKG P-R INTERVAL: 214 MS
EKG Q-T INTERVAL: 432 MS
EKG QRS DURATION: 88 MS
EKG QTC CALCULATION (BAZETT): 456 MS
EKG R AXIS: 43 DEGREES
EKG T AXIS: 118 DEGREES
EKG VENTRICULAR RATE: 67 BPM
GFR AFRICAN AMERICAN: >60
GFR NON-AFRICAN AMERICAN: >60 ML/MIN/1.73
GLUCOSE BLD-MCNC: 110 MG/DL (ref 74–99)
HCT VFR BLD CALC: 39.4 % (ref 37–54)
HEMOGLOBIN: 13.4 G/DL (ref 12.5–16.5)
MCH RBC QN AUTO: 35.6 PG (ref 26–35)
MCHC RBC AUTO-ENTMCNC: 34 % (ref 32–34.5)
MCV RBC AUTO: 104.8 FL (ref 80–99.9)
PDW BLD-RTO: 13 FL (ref 11.5–15)
PLATELET # BLD: 234 E9/L (ref 130–450)
PMV BLD AUTO: 9.9 FL (ref 7–12)
POTASSIUM SERPL-SCNC: 3.7 MMOL/L (ref 3.5–5)
RBC # BLD: 3.76 E12/L (ref 3.8–5.8)
SODIUM BLD-SCNC: 136 MMOL/L (ref 132–146)
WBC # BLD: 4 E9/L (ref 4.5–11.5)

## 2021-06-10 PROCEDURE — 36415 COLL VENOUS BLD VENIPUNCTURE: CPT

## 2021-06-10 PROCEDURE — 87081 CULTURE SCREEN ONLY: CPT

## 2021-06-10 PROCEDURE — 80048 BASIC METABOLIC PNL TOTAL CA: CPT

## 2021-06-10 PROCEDURE — 93005 ELECTROCARDIOGRAM TRACING: CPT

## 2021-06-10 PROCEDURE — 85027 COMPLETE CBC AUTOMATED: CPT

## 2021-06-10 RX ORDER — METOPROLOL SUCCINATE 25 MG/1
TABLET, EXTENDED RELEASE ORAL
COMMUNITY
Start: 2021-04-24

## 2021-06-10 ASSESSMENT — HOOS JR
WALKING ON UNEVEN SURFACE: 2
HOOS JR RAW SCORE: 14
SITTING: 2
BENDING TO THE FLOOR TO PICK UP OBJECT: 3
LYING IN BED (TURNING OVER, MAINTAINING HIP POSITION): 3
GOING UP OR DOWN STAIRS: 2
RISING FROM SITTING: 2

## 2021-06-10 NOTE — PROGRESS NOTES
Dr. Cinda Reyes reviewed pt cardiac history, echo, heart cath results,EKG from today and notified of EF 20-25% per echo 8/20 and all lab results. Awaiting medical clearance and AICD interrogation.

## 2021-06-10 NOTE — TELEPHONE ENCOUNTER
Returned phone call from April Laws, [address] [facility] [phone]. April Laws asked if Cesario Baldwin sent a remote transmission for his ICD. I told April Laws I checked in both [employer] and [employer] site, there are no transmissions since 12/9/2020. She will call patient and have him try and send another transmission. I gave her the Carelink number Aurora Bloch is having issues with his communicator. 6/11/2021: Marbin Hsu RN at [facility]. Left message on "[employer]'s voicemail that we did not receive any transmissions on [employer]'s ICD remote. I left the number for Carelink support in my message. 6/17/21: Marbin Hsu RN [facility], to inform her we did receive a transmission on 6/14. April Laws informed me the surgery was cancelled as he has not seen his PCP for 2 years. I thanked April Laws for the information. She will call if anything further is needed. Message sent to schedulers this patient needs appointment with Dr. Laine Cline.     Pascale Kelly RN, BSN  [facility]

## 2021-06-10 NOTE — PROGRESS NOTES
Jina PRE-ADMISSION TESTING INSTRUCTIONS    The Preadmission Testing patient is instructed accordingly using the following criteria (check applicable):    ARRIVAL INSTRUCTIONS:  [x] Parking the day of Surgery is located in the Main Entrance lot. Upon entering the door, make an immediate right to the surgery reception desk    [x] Bring photo ID and insurance card    [x] Bring in a copy of Living will or Durable Power of  papers. [x] Please be sure to arrange for responsible adult to provide transportation to and from the hospital    [x] Please arrange for responsible adult to be with you for the 24 hour period post procedure due to having anesthesia      GENERAL INSTRUCTIONS:    [x] Nothing by mouth after midnight, including gum, candy, mints or water    [x] You may brush your teeth, but do not swallow any water    [x] Take medications as instructed with 1-2 oz of water    [] Stop herbal supplements and vitamins 5 days prior to procedure    [x] Follow preop dosing of blood thinners per physician instructions    [] Take 1/2 dose of evening insulin, but no insulin after midnight    [] No oral diabetic medications after midnight    [] If diabetic and have low blood sugar or feel symptomatic, take 1-2oz apple juice only    [] Bring inhalers day of surgery    [] Bring C-PAP/ Bi-Pap day of surgery    [] Bring urine specimen day of surgery    [] Shower or bath with soap, lather and rinse well, AM of Surgery, no lotion, powders or creams to surgical site    [] Follow bowel prep as instructed per surgeon    [x] No tobacco products within 24 hours of surgery     [x] No alcohol or illegal drug use within 24 hours of surgery.     [x] Jewelry, body piercing's, eyeglasses, contact lenses and dentures are not permitted into surgery (bring cases)      [] Please do not wear any nail polish, make up or hair products on the day of surgery    [x] You can expect a call the business day prior to procedure to notify you if your arrival time changes    [x] If you receive a survey after surgery we would greatly appreciate your comments    [] Parent/guardian of a minor must accompany their child and remain on the premises  the entire time they are under our care     [] Pediatric patients may bring favorite toy, blanket or comfort item with them    [] A caregiver or family member must remain with the patient during their stay if they are mentally handicapped, have dementia, disoriented or unable to use a call light or would be a safety concern if left unattended    [x] Please notify surgeon if you develop any illness between now and time of surgery (cold, cough, sore throat, fever, nausea, vomiting) or any signs of infections  including skin, wounds, and dental.    [x]  The Outpatient Pharmacy is available to fill your prescription here on your day of surgery, ask your preop nurse for details    [x] Other instructions: wear loose, comfortable clothing    EDUCATIONAL MATERIALS PROVIDED:    [x] PAT Preoperative Education Packet/Booklet     [x] Medication List    [] Transfusion bracelet applied with instructions    [x] Shower with soap, lather and rinse well, and use CHG wipes provided the evening before surgery as instructed    [x] Incentive spirometer with instructions

## 2021-06-10 NOTE — PROGRESS NOTES
Pt. States he has misplaced his COVId vaccine card, instructed pt. To call the health dept today for a replacement card and to call us when he gets it so we can have that documentation for records required for surgery. pt also states he has not seen PCP in \"a while\" explained to pt. He needs to make appt to see PCP for medical clearance for surgery that is required or surgery will be cancelled. Pt verbalizes understanding. left message with Dr. Felton Posey office that pt. Has not got medical clearance yet for surgery by PCP  As mentioned above. Pt has AICD and has never been \"shocked\".

## 2021-06-11 ENCOUNTER — TELEPHONE (OUTPATIENT)
Dept: CARDIOLOGY CLINIC | Age: 61
End: 2021-06-11

## 2021-06-11 LAB — MRSA CULTURE ONLY: NORMAL

## 2021-06-11 NOTE — TELEPHONE ENCOUNTER
Received an EKG from [facility]. [facility] is asking Dr. Zoe Tam to review for any changes. Patient scheduled for right total hip arthroplasty by Dr. Karina Ames on 6/17/21. Please review and advise.

## 2021-06-11 NOTE — TELEPHONE ENCOUNTER
Spoke with patient. Patient denies any chest pain, shortness of breath or palpitations since last visit in December 2020. Patient is able to complete all activities of daily living, including; climbing one flight of stairs and walking one block without cardiac symptoms. Nelida in [facility] PAT notified. She will check of patient actually needs cardiac clearance and get back to me.

## 2021-06-14 ENCOUNTER — NURSE ONLY (OUTPATIENT)
Dept: NON INVASIVE DIAGNOSTICS | Age: 61
End: 2021-06-14
Payer: COMMERCIAL

## 2021-06-14 DIAGNOSIS — I42.8 NONISCHEMIC CARDIOMYOPATHY (HCC): ICD-10-CM

## 2021-06-14 DIAGNOSIS — I48.92 ATRIAL FLUTTER WITH RAPID VENTRICULAR RESPONSE (HCC): ICD-10-CM

## 2021-06-14 DIAGNOSIS — I47.20 VENTRICULAR TACHYCARDIA: ICD-10-CM

## 2021-06-14 DIAGNOSIS — Z95.810 IMPLANTABLE CARDIOVERTER-DEFIBRILLATOR (ICD) IN SITU: Primary | ICD-10-CM

## 2021-06-17 PROCEDURE — 93296 REM INTERROG EVL PM/IDS: CPT | Performed by: INTERNAL MEDICINE

## 2021-06-17 PROCEDURE — 93295 DEV INTERROG REMOTE 1/2/MLT: CPT | Performed by: INTERNAL MEDICINE

## 2021-06-17 PROCEDURE — 93297 REM INTERROG DEV EVAL ICPMS: CPT | Performed by: INTERNAL MEDICINE

## 2021-06-17 NOTE — PROGRESS NOTES
See [employer] Magdalena report. Remote monitoring reviewed over a 90 day period. End of 90 day monitoring period date of service 6-.

## 2021-06-22 ENCOUNTER — TELEPHONE (OUTPATIENT)
Dept: NON INVASIVE DIAGNOSTICS | Age: 61
End: 2021-06-22

## 2021-07-16 RX ORDER — HYDRALAZINE HYDROCHLORIDE 25 MG/1
25 TABLET, FILM COATED ORAL EVERY 8 HOURS SCHEDULED
Qty: 270 TABLET | Refills: 3 | Status: SHIPPED
Start: 2021-07-16 | End: 2022-07-19

## 2021-07-23 ASSESSMENT — PROMIS GLOBAL HEALTH SCALE
TO WHAT EXTENT ARE YOU ABLE TO CARRY OUT YOUR EVERYDAY PHYSICAL ACTIVITIES SUCH AS WALKING, CLIMBING STAIRS, CARRYING GROCERIES, OR MOVING A CHAIR [ON A SCALE OF 1 (NOT AT ALL) TO 5 (COMPLETELY)]?: 2
IN THE PAST 7 DAYS, HOW WOULD YOU RATE YOUR FATIGUE ON AVERAGE [ON A SCALE FROM 1 (NONE) TO 5 (VERY SEVERE)]?: 5
IN GENERAL, WOULD YOU SAY YOUR HEALTH IS...[ON A SCALE OF 1 (POOR) TO 5 (EXCELLENT)]: 3
IN THE PAST 7 DAYS, HOW OFTEN HAVE YOU BEEN BOTHERED BY EMOTIONAL PROBLEMS, SUCH AS FEELING ANXIOUS, DEPRESSED, OR IRRITABLE [ON A SCALE FROM 1 (NEVER) TO 5 (ALWAYS)]?: 1
IN GENERAL, HOW WOULD YOU RATE YOUR PHYSICAL HEALTH [ON A SCALE OF 1 (POOR) TO 5 (EXCELLENT)]?: 2
IN GENERAL, PLEASE RATE HOW WELL YOU CARRY OUT YOUR USUAL SOCIAL ACTIVITIES (INCLUDES ACTIVITIES AT HOME, AT WORK, AND IN YOUR COMMUNITY, AND RESPONSIBILITIES AS A PARENT, CHILD, SPOUSE, EMPLOYEE, FRIEND, ETC) [ON A SCALE OF 1 (POOR) TO 5 (EXCELLENT)]?: 2
IN GENERAL, HOW WOULD YOU RATE YOUR MENTAL HEALTH, INCLUDING YOUR MOOD AND YOUR ABILITY TO THINK [ON A SCALE OF 1 (POOR) TO 5 (EXCELLENT)]?: 5
IN GENERAL, HOW WOULD YOU RATE YOUR SATISFACTION WITH YOUR SOCIAL ACTIVITIES AND RELATIONSHIPS [ON A SCALE OF 1 (POOR) TO 5 (EXCELLENT)]?: 4
IN GENERAL, WOULD YOU SAY YOUR QUALITY OF LIFE IS...[ON A SCALE OF 1 (POOR) TO 5 (EXCELLENT)]: 1
IN THE PAST 7 DAYS, HOW WOULD YOU RATE YOUR PAIN ON AVERAGE [ON A SCALE FROM 0 (NO PAIN) TO 10 (WORST IMAGINABLE PAIN)]?: 7
WHO IS THE PERSON COMPLETING THE PROMIS V1.1 SURVEY?: 0
SUM OF RESPONSES TO QUESTIONS 3, 6, 7, & 8: 16
SUM OF RESPONSES TO QUESTIONS 2, 4, 5, & 10: 11
HOW IS THE PROMIS V1.1 BEING ADMINISTERED?: 2

## 2021-07-23 ASSESSMENT — HOOS JR
WALKING ON UNEVEN SURFACE: 2
GOING UP OR DOWN STAIRS: 3
SITTING: 2
LYING IN BED (TURNING OVER, MAINTAINING HIP POSITION): 2
RISING FROM SITTING: 3
BENDING TO THE FLOOR TO PICK UP OBJECT: 3
HOOS JR RAW SCORE: 15

## 2021-07-28 ENCOUNTER — PREP FOR PROCEDURE (OUTPATIENT)
Dept: SURGERY | Age: 61
End: 2021-07-28

## 2021-07-28 DIAGNOSIS — M16.11 PRIMARY OSTEOARTHRITIS OF RIGHT HIP: Primary | ICD-10-CM

## 2021-07-28 RX ORDER — SODIUM CHLORIDE 9 MG/ML
25 INJECTION, SOLUTION INTRAVENOUS PRN
Status: CANCELLED | OUTPATIENT
Start: 2021-07-28

## 2021-07-28 RX ORDER — SODIUM CHLORIDE 0.9 % (FLUSH) 0.9 %
5-40 SYRINGE (ML) INJECTION EVERY 12 HOURS SCHEDULED
Status: CANCELLED | OUTPATIENT
Start: 2021-07-28

## 2021-07-28 RX ORDER — ACETAMINOPHEN 325 MG/1
1000 TABLET ORAL ONCE
Status: CANCELLED | OUTPATIENT
Start: 2021-07-28 | End: 2021-07-28

## 2021-07-28 RX ORDER — CELECOXIB 200 MG/1
200 CAPSULE ORAL ONCE
Status: CANCELLED | OUTPATIENT
Start: 2021-07-28 | End: 2021-07-28

## 2021-07-28 RX ORDER — SODIUM CHLORIDE 0.9 % (FLUSH) 0.9 %
5-40 SYRINGE (ML) INJECTION PRN
Status: CANCELLED | OUTPATIENT
Start: 2021-07-28

## 2021-07-28 RX ORDER — SODIUM CHLORIDE, SODIUM LACTATE, POTASSIUM CHLORIDE, CALCIUM CHLORIDE 600; 310; 30; 20 MG/100ML; MG/100ML; MG/100ML; MG/100ML
INJECTION, SOLUTION INTRAVENOUS CONTINUOUS
Status: CANCELLED | OUTPATIENT
Start: 2021-07-28

## 2021-07-30 NOTE — PROGRESS NOTES
Spoke to Olivier from Dr. Rene Louie office to notify patient has PAT visit scheduled for Monday and needs medical clearance for TRHA scheduled 8/5/21.

## 2021-08-02 ENCOUNTER — HOSPITAL ENCOUNTER (OUTPATIENT)
Dept: PREADMISSION TESTING | Age: 61
Discharge: HOME | End: 2021-08-02
Payer: COMMERCIAL

## 2021-08-02 ENCOUNTER — PREP FOR PROCEDURE (OUTPATIENT)
Dept: SURGERY | Age: 61
End: 2021-08-02

## 2021-08-02 ENCOUNTER — HOSPITAL ENCOUNTER (OUTPATIENT)
Age: 61
Discharge: HOME | DRG: 470 | End: 2021-08-02
Payer: COMMERCIAL

## 2021-08-02 VITALS
SYSTOLIC BLOOD PRESSURE: 109 MMHG | OXYGEN SATURATION: 96 % | TEMPERATURE: 98.4 F | HEART RATE: 69 BPM | RESPIRATION RATE: 20 BRPM | WEIGHT: 232 LBS | BODY MASS INDEX: 31.42 KG/M2 | HEIGHT: 72 IN | DIASTOLIC BLOOD PRESSURE: 64 MMHG

## 2021-08-02 DIAGNOSIS — M16.11 PRIMARY OSTEOARTHRITIS OF RIGHT HIP: ICD-10-CM

## 2021-08-02 LAB
ALBUMIN SERPL-MCNC: 4.3 G/DL (ref 3.5–5.2)
ALP BLD-CCNC: 68 U/L (ref 40–129)
ALT SERPL-CCNC: 44 U/L (ref 0–40)
ANION GAP SERPL CALCULATED.3IONS-SCNC: 9 MMOL/L (ref 7–16)
AST SERPL-CCNC: 53 U/L (ref 0–39)
BILIRUB SERPL-MCNC: 0.8 MG/DL (ref 0–1.2)
BUN BLDV-MCNC: 17 MG/DL (ref 6–20)
CALCIUM SERPL-MCNC: 9.7 MG/DL (ref 8.6–10.2)
CHLORIDE BLD-SCNC: 100 MMOL/L (ref 98–107)
CHOLESTEROL, FASTING: 191 MG/DL (ref 0–199)
CO2: 28 MMOL/L (ref 22–29)
CREAT SERPL-MCNC: 1.4 MG/DL (ref 0.7–1.2)
GFR AFRICAN AMERICAN: >60
GFR NON-AFRICAN AMERICAN: >60 ML/MIN/1.73
GLUCOSE BLD-MCNC: 107 MG/DL (ref 74–99)
HCT VFR BLD CALC: 42.1 % (ref 37–54)
HDLC SERPL-MCNC: 91 MG/DL
HEMOGLOBIN: 13.8 G/DL (ref 12.5–16.5)
LDL CHOLESTEROL CALCULATED: 88 MG/DL (ref 0–99)
MCH RBC QN AUTO: 35.1 PG (ref 26–35)
MCHC RBC AUTO-ENTMCNC: 32.8 % (ref 32–34.5)
MCV RBC AUTO: 107.1 FL (ref 80–99.9)
PDW BLD-RTO: 13.1 FL (ref 11.5–15)
PLATELET # BLD: 227 E9/L (ref 130–450)
PMV BLD AUTO: 9.5 FL (ref 7–12)
POTASSIUM SERPL-SCNC: 4.3 MMOL/L (ref 3.5–5)
PROSTATE SPECIFIC ANTIGEN: 1.49 NG/ML (ref 0–4)
RBC # BLD: 3.93 E12/L (ref 3.8–5.8)
SODIUM BLD-SCNC: 137 MMOL/L (ref 132–146)
TOTAL PROTEIN: 7.6 G/DL (ref 6.4–8.3)
TRIGLYCERIDE, FASTING: 58 MG/DL (ref 0–149)
TSH SERPL DL<=0.05 MIU/L-ACNC: 4.12 UIU/ML (ref 0.27–4.2)
VLDLC SERPL CALC-MCNC: 12 MG/DL
WBC # BLD: 3.1 E9/L (ref 4.5–11.5)

## 2021-08-02 PROCEDURE — 84443 ASSAY THYROID STIM HORMONE: CPT

## 2021-08-02 PROCEDURE — 36415 COLL VENOUS BLD VENIPUNCTURE: CPT

## 2021-08-02 PROCEDURE — G0103 PSA SCREENING: HCPCS

## 2021-08-02 PROCEDURE — 80053 COMPREHEN METABOLIC PANEL: CPT

## 2021-08-02 PROCEDURE — 85027 COMPLETE CBC AUTOMATED: CPT

## 2021-08-02 PROCEDURE — 87081 CULTURE SCREEN ONLY: CPT

## 2021-08-02 PROCEDURE — 80061 LIPID PANEL: CPT

## 2021-08-02 ASSESSMENT — HOOS JR
RISING FROM SITTING: 4
SITTING: 2
LYING IN BED (TURNING OVER, MAINTAINING HIP POSITION): 4
WALKING ON UNEVEN SURFACE: 3
HOOS JR RAW SCORE: 19
GOING UP OR DOWN STAIRS: 3
BENDING TO THE FLOOR TO PICK UP OBJECT: 3

## 2021-08-02 ASSESSMENT — PAIN DESCRIPTION - ORIENTATION: ORIENTATION: RIGHT

## 2021-08-02 ASSESSMENT — PAIN DESCRIPTION - LOCATION: LOCATION: HIP

## 2021-08-02 ASSESSMENT — PAIN SCALES - GENERAL: PAINLEVEL_OUTOF10: 5

## 2021-08-02 ASSESSMENT — PAIN DESCRIPTION - PAIN TYPE: TYPE: CHRONIC PAIN

## 2021-08-02 NOTE — DISCHARGE INSTR - COC
Continuity of Care Form    Patient Name: Jaime Mirza   :  1960  MRN:  [id number]    Admit date:  2021  Discharge date:  ***    Code Status Order: Prior   Advance Directives:   [facility] Documentation     Date/Time Healthcare Directive Type of Healthcare Directive Copy in [address] [facility] [address] Agent's Name Healthcare Agent's Phone Number    21 [phone]  No, patient does not have an advance directive for healthcare treatment  --  --  --  --  --          Admitting Physician:  No admitting provider for patient encounter. PCP: Deo Kowalski MD    Discharging Nurse: [facility] Unit/Room#: No information available for this encounter.   Discharging Unit Phone Number: ***    Emergency Contact:   Extended Emergency Contact Information  Primary Emergency Contact: [facility] Phone: [phone]  Relation: Child    Past Surgical History:  Past Surgical History:   Procedure Laterality Date    BACK SURGERY      lumbar area L4 L5    CARDIAC DEFIBRILLATOR PLACEMENT  2020    Dual ICD    (medtronic)    Dr. Uriel Turner, [address] [facility] Right 's    inguinal    HIP SURGERY  2016    left total; hip arthroplasty    JOINT REPLACEMENT Left 2013    hip replacement    PACEMAKER PLACEMENT      AICD    WRIST SURGERY Right        Immunization History:   Immunization History   Administered Date(s) Administered    COVID-19, Moderna, PF, 100mcg/0.5mL 2021, 2021    Tdap (Boostrix, Adacel) 2015       Active Problems:  Patient Active Problem List   Diagnosis Code    Primary osteoarthritis of left hip M16.12    Essential hypertension I10    Acute blood loss as cause of postoperative anemia D62    Osteoarthritis of multiple joints M15.9    New onset atrial flutter (HCC) I48.92    Acute decompensated heart failure (HCC) I50.9    New onset of congestive heart failure (HCC) I50.9    Atrial flutter with rapid ventricular response (HCC) I48.92    Acidosis E87.2    Elevated LFTs R79.89    CAD in native artery I25.10    Acute diastolic CHF (congestive heart failure) (HCC) I50.31    Atrial flutter (HCC) I48.92    Nonischemic cardiomyopathy (HCC) I42.8    Ventricular tachycardia (HCC) I47.2    Defibrillator discharge Z45.02       Isolation/Infection:   Isolation          No Isolation        Patient Infection Status     Infection Onset Added Last Indicated Last Indicated By Review Planned Expiration Resolved Resolved By    None active    Resolved    COVID-19 Rule Out 08/15/20 08/15/20 08/15/20 COVID-19 (Ordered)   08/15/20 Rule-Out Test Resulted          Nurse Assessment:  Last Vital Signs: /64   Pulse 69 Comment: regular rate and rhythm  Temp 98.4 °F (36.9 °C) (Oral)   Resp 20   Ht 6' (1.829 m)   Wt 232 lb (105.2 kg)   SpO2 96%   BMI 31.46 kg/m²     Last documented pain score (0-10 scale): Pain Level: 5  Last Weight:   Wt Readings from Last 1 Encounters:   08/02/21 232 lb (105.2 kg)     Mental Status:  {IP PT MENTAL STATUS:89812}    IV Access:  { LILIA IV ACCESS:632069720}    Nursing Mobility/ADLs:  Walking   {P DME DZPB:026072080}  Transfer  {P DME YCGZ:484963272}  Bathing  {P DME ZVOW:905591418}  Dressing  {[facility] DME ZSAD:637803840}  Toileting  {P DME OMIT:593693935}  Feeding  {[facility] DME MHJC:929408781}  Med Admin  {[facility] DME JJPO:520006104}  Med Delivery   { LILIA MED Delivery:785174860}    Wound Care Documentation and Therapy:        Elimination:  Continence:   · Bowel: {YES / CO:07427}  · Bladder: {YES / YE:70930}  Urinary Catheter: {Urinary Catheter:180980252}   Colostomy/Ileostomy/Ileal Conduit: {YES / BJ:39245}       Date of Last BM: ***  No intake or output data in the 24 hours ending 08/02/21 1246  No intake/output data recorded.     Safety Concerns:     Maame RODRIGUES Safety Concerns:059870167}    Impairments/Disabilities:      Maame RODRIGUES Impairments/Disabilities:563931288}    Nutrition Therapy:  Current Nutrition Therapy:   [address] LILIA Diet List:115948390}    Routes of Feeding: {CHP DME Other Feedings:532959919}  Liquids: {Slp liquid thickness:22315}  Daily Fluid Restriction: {CHP DME Yes amt example:356640600}  Last Modified Barium Swallow with Video (Video Swallowing Test): {Done Not Done AKGV:804232699}    Treatments at the Time of Hospital Discharge:   Respiratory Treatments: ***  Oxygen Therapy:  {Therapy; copd oxygen:01647}  Ventilator:    {MH CC Vent GWJF:896808114}    Rehab Therapies: {THERAPEUTIC INTERVENTION:6384856047}  Weight Bearing Status/Restrictions: [address] CC Weight Bearin}  Other Medical Equipment (for information only, NOT a DME order):  {EQUIPMENT:516629705}  Other Treatments: ***    Patient's personal belongings (please select all that are sent with patient):  {[facility] DME Belongings:158163061}    RN SIGNATURE:  {Esignature:092638340}    CASE MANAGEMENT/SOCIAL WORK SECTION    Inpatient Status Date: ***    Readmission Risk Assessment Score:  Readmission Risk              Risk of Unplanned Readmission:  0           Discharging to Facility/ Agency   · Name:   · Address:  · Phone:  · Fax:    Dialysis Facility (if applicable)   · Name:  · Address:  · Dialysis Schedule:  · Phone:  · Fax:    / signature: {Esignature:246302410}    PHYSICIAN SECTION    Prognosis: {Prognosis:2842671013}    Condition at Discharge: [address] Patient Condition:104286325}    Rehab Potential (if transferring to Rehab): {Prognosis:0791279086}    Recommended Labs or Other Treatments After Discharge: ***    Physician Certification: I certify the above information and transfer of Daron Woodruff  is necessary for the continuing treatment of the diagnosis listed and that he requires {Admit to Appropriate Level of Care:15702} for {GREATER/LESS:051763511} 30 days.      Update Admission H&P: {CHP DME Changes in LGPBF:808433314}    PHYSICIAN SIGNATURE: {[facility]:777239160}

## 2021-08-02 NOTE — PROGRESS NOTES
Jina PRE-ADMISSION TESTING INSTRUCTIONS    The Preadmission Testing patient is instructed accordingly using the following criteria (check applicable):    ARRIVAL INSTRUCTIONS:  [x] Parking the day of Surgery is located in the Main Entrance lot. Upon entering the door, make an immediate right to the surgery reception desk    [x] Bring photo ID and insurance card    [] Bring in a copy of Living will or Durable Power of  papers. [x] Please be sure to arrange for responsible adult to provide transportation to and from the hospital    [x] Please arrange for responsible adult to be with you for the 24 hour period post procedure due to having anesthesia      GENERAL INSTRUCTIONS:    [x] Nothing by mouth after midnight, including gum, candy, mints or water    [x] You may brush your teeth, but do not swallow any water    [x] Take medications as instructed with 1-2 oz of water    [] Stop herbal supplements and vitamins 5 days prior to procedure    [x] Follow preop dosing of blood thinners per physician instructions    [] Take 1/2 dose of evening insulin, but no insulin after midnight    [] No oral diabetic medications after midnight    [] If diabetic and have low blood sugar or feel symptomatic, take 1-2oz apple juice only    [] Bring inhalers day of surgery    [] Bring C-PAP/ Bi-Pap day of surgery    [] Bring urine specimen day of surgery    [x] Shower or bath with soap, lather and rinse well, AM of Surgery, no lotion, powders or creams to surgical site    [] Follow bowel prep as instructed per surgeon    [x] No tobacco products within 24 hours of surgery     [x] No alcohol or illegal drug use within 24 hours of surgery.     [x] Jewelry, body piercing's, eyeglasses, contact lenses and dentures are not permitted into surgery (bring cases)      [] Please do not wear any nail polish, make up or hair products on the day of surgery    [x] You can expect a call the business day prior to procedure to notify you if your arrival time changes    [x] If you receive a survey after surgery we would greatly appreciate your comments    [] Parent/guardian of a minor must accompany their child and remain on the premises  the entire time they are under our care     [] Pediatric patients may bring favorite toy, blanket or comfort item with them    [] A caregiver or family member must remain with the patient during their stay if they are mentally handicapped, have dementia, disoriented or unable to use a call light or would be a safety concern if left unattended    [x] Please notify surgeon if you develop any illness between now and time of surgery (cold, cough, sore throat, fever, nausea, vomiting) or any signs of infections  including skin, wounds, and dental.    [x]  The Outpatient Pharmacy is available to fill your prescription here on your day of surgery, ask your preop nurse for details    [] Other instructions    EDUCATIONAL MATERIALS PROVIDED:    [x] PAT Preoperative Education Packet/Booklet     [x] Medication List    [] Transfusion bracelet applied with instructions    [x] Shower with soap, lather and rinse well, and use CHG wipes provided the evening before surgery as instructed    [x] Incentive spirometer with instructions

## 2021-08-02 NOTE — PROGRESS NOTES
Have you been tested for COVID  Yes           Have you been told you were positive for COVID No  Have you had any known exposure to someone that is positive for COVID No  Do you have a cough                   no           Do you have shortness of breath no                Do you have a sore throat            No                Are you having chills                    No                Are you having muscle aches. No                    Please come to the hospital wearing a mask and have your significant other wear a mask as well. Both of you should check your temperature before leaving to come here,  if it is 100 or higher please call [phone] for instruction.

## 2021-08-03 LAB — MRSA CULTURE ONLY: NORMAL

## 2021-08-05 ENCOUNTER — HOSPITAL ENCOUNTER (INPATIENT)
Age: 61
LOS: 2 days | Discharge: HOME | DRG: 470 | End: 2021-08-07
Attending: ORTHOPAEDIC SURGERY | Admitting: ORTHOPAEDIC SURGERY
Payer: COMMERCIAL

## 2021-08-05 ENCOUNTER — ANESTHESIA EVENT (OUTPATIENT)
Dept: OPERATING ROOM | Age: 61
DRG: 470 | End: 2021-08-05
Payer: COMMERCIAL

## 2021-08-05 ENCOUNTER — ANESTHESIA (OUTPATIENT)
Dept: OPERATING ROOM | Age: 61
DRG: 470 | End: 2021-08-05
Payer: COMMERCIAL

## 2021-08-05 ENCOUNTER — APPOINTMENT (OUTPATIENT)
Dept: GENERAL RADIOLOGY | Age: 61
DRG: 470 | End: 2021-08-05
Attending: ORTHOPAEDIC SURGERY
Payer: COMMERCIAL

## 2021-08-05 VITALS — TEMPERATURE: 97 F | DIASTOLIC BLOOD PRESSURE: 50 MMHG | OXYGEN SATURATION: 98 % | SYSTOLIC BLOOD PRESSURE: 81 MMHG

## 2021-08-05 DIAGNOSIS — M16.11 PRIMARY OSTEOARTHRITIS OF RIGHT HIP: Primary | ICD-10-CM

## 2021-08-05 PROCEDURE — 3700000000 HC ANESTHESIA ATTENDED CARE: Performed by: ORTHOPAEDIC SURGERY

## 2021-08-05 PROCEDURE — 2700000000 HC OXYGEN THERAPY PER DAY

## 2021-08-05 PROCEDURE — 99254 IP/OBS CNSLTJ NEW/EST MOD 60: CPT | Performed by: INTERNAL MEDICINE

## 2021-08-05 PROCEDURE — 6370000000 HC RX 637 (ALT 250 FOR IP): Performed by: ORTHOPAEDIC SURGERY

## 2021-08-05 PROCEDURE — 2500000003 HC RX 250 WO HCPCS: Performed by: PHYSICIAN ASSISTANT

## 2021-08-05 PROCEDURE — 1200000000 HC SEMI PRIVATE

## 2021-08-05 PROCEDURE — 2709999900 HC NON-CHARGEABLE SUPPLY: Performed by: ORTHOPAEDIC SURGERY

## 2021-08-05 PROCEDURE — 2580000003 HC RX 258: Performed by: NURSE ANESTHETIST, CERTIFIED REGISTERED

## 2021-08-05 PROCEDURE — 2580000003 HC RX 258: Performed by: ORTHOPAEDIC SURGERY

## 2021-08-05 PROCEDURE — 3700000001 HC ADD 15 MINUTES (ANESTHESIA): Performed by: ORTHOPAEDIC SURGERY

## 2021-08-05 PROCEDURE — C1776 JOINT DEVICE (IMPLANTABLE): HCPCS | Performed by: ORTHOPAEDIC SURGERY

## 2021-08-05 PROCEDURE — 2500000003 HC RX 250 WO HCPCS: Performed by: ORTHOPAEDIC SURGERY

## 2021-08-05 PROCEDURE — 2580000003 HC RX 258: Performed by: PHYSICIAN ASSISTANT

## 2021-08-05 PROCEDURE — 6360000002 HC RX W HCPCS: Performed by: ORTHOPAEDIC SURGERY

## 2021-08-05 PROCEDURE — 7100000001 HC PACU RECOVERY - ADDTL 15 MIN: Performed by: ORTHOPAEDIC SURGERY

## 2021-08-05 PROCEDURE — 6370000000 HC RX 637 (ALT 250 FOR IP): Performed by: PHYSICIAN ASSISTANT

## 2021-08-05 PROCEDURE — 2500000003 HC RX 250 WO HCPCS: Performed by: NURSE ANESTHETIST, CERTIFIED REGISTERED

## 2021-08-05 PROCEDURE — 97165 OT EVAL LOW COMPLEX 30 MIN: CPT

## 2021-08-05 PROCEDURE — 97530 THERAPEUTIC ACTIVITIES: CPT

## 2021-08-05 PROCEDURE — 7100000000 HC PACU RECOVERY - FIRST 15 MIN: Performed by: ORTHOPAEDIC SURGERY

## 2021-08-05 PROCEDURE — 6360000002 HC RX W HCPCS: Performed by: NURSE ANESTHETIST, CERTIFIED REGISTERED

## 2021-08-05 PROCEDURE — 88304 TISSUE EXAM BY PATHOLOGIST: CPT

## 2021-08-05 PROCEDURE — 88311 DECALCIFY TISSUE: CPT

## 2021-08-05 PROCEDURE — 6360000002 HC RX W HCPCS: Performed by: PHYSICIAN ASSISTANT

## 2021-08-05 PROCEDURE — 0SR904A REPLACEMENT OF RIGHT HIP JOINT WITH CERAMIC ON POLYETHYLENE SYNTHETIC SUBSTITUTE, UNCEMENTED, OPEN APPROACH: ICD-10-PCS | Performed by: ORTHOPAEDIC SURGERY

## 2021-08-05 PROCEDURE — 3600000005 HC SURGERY LEVEL 5 BASE: Performed by: ORTHOPAEDIC SURGERY

## 2021-08-05 PROCEDURE — 3600000015 HC SURGERY LEVEL 5 ADDTL 15MIN: Performed by: ORTHOPAEDIC SURGERY

## 2021-08-05 PROCEDURE — 73501 X-RAY EXAM HIP UNI 1 VIEW: CPT

## 2021-08-05 PROCEDURE — 6360000002 HC RX W HCPCS: Performed by: ANESTHESIOLOGY

## 2021-08-05 DEVICE — HEAD FEM DIA36MM HIP BIOLOX DELT OPT FOR G7 ACET SYS: Type: IMPLANTABLE DEVICE | Site: HIP | Status: FUNCTIONAL

## 2021-08-05 DEVICE — SLEEVE FEM STD OFFSET HIP TYP 1 TAPR FOR CERAMIC BIOLOX: Type: IMPLANTABLE DEVICE | Site: HIP | Status: FUNCTIONAL

## 2021-08-05 DEVICE — LINER ACET 36 MM HIP POLYETH G7 VIVACIT E: Type: IMPLANTABLE DEVICE | Site: HIP | Status: FUNCTIONAL

## 2021-08-05 DEVICE — STEM FEM SZ 13 L146MM HI OFFSET DST HIP TI PPS TYP 1 TAPR: Type: IMPLANTABLE DEVICE | Site: HIP | Status: FUNCTIONAL

## 2021-08-05 DEVICE — SHELL ACET SZ G DIA58MM HIP PPS LIMIT H G7: Type: IMPLANTABLE DEVICE | Site: HIP | Status: FUNCTIONAL

## 2021-08-05 RX ORDER — MORPHINE SULFATE 2 MG/ML
2 INJECTION, SOLUTION INTRAMUSCULAR; INTRAVENOUS EVERY 5 MIN PRN
Status: DISCONTINUED | OUTPATIENT
Start: 2021-08-05 | End: 2021-08-05 | Stop reason: HOSPADM

## 2021-08-05 RX ORDER — NEOSTIGMINE METHYLSULFATE 1 MG/ML
INJECTION, SOLUTION INTRAVENOUS PRN
Status: DISCONTINUED | OUTPATIENT
Start: 2021-08-05 | End: 2021-08-05 | Stop reason: SDUPTHER

## 2021-08-05 RX ORDER — MORPHINE SULFATE 2 MG/ML
2 INJECTION, SOLUTION INTRAMUSCULAR; INTRAVENOUS EVERY 4 HOURS PRN
Status: DISCONTINUED | OUTPATIENT
Start: 2021-08-05 | End: 2021-08-07 | Stop reason: HOSPADM

## 2021-08-05 RX ORDER — LABETALOL HYDROCHLORIDE 5 MG/ML
INJECTION, SOLUTION INTRAVENOUS PRN
Status: DISCONTINUED | OUTPATIENT
Start: 2021-08-05 | End: 2021-08-05 | Stop reason: SDUPTHER

## 2021-08-05 RX ORDER — OXYCODONE HYDROCHLORIDE 5 MG/1
10 TABLET ORAL EVERY 4 HOURS PRN
Status: DISCONTINUED | OUTPATIENT
Start: 2021-08-05 | End: 2021-08-07 | Stop reason: HOSPADM

## 2021-08-05 RX ORDER — METOPROLOL SUCCINATE 25 MG/1
25 TABLET, EXTENDED RELEASE ORAL DAILY
Status: DISCONTINUED | OUTPATIENT
Start: 2021-08-05 | End: 2021-08-07 | Stop reason: HOSPADM

## 2021-08-05 RX ORDER — SODIUM CHLORIDE, SODIUM LACTATE, POTASSIUM CHLORIDE, CALCIUM CHLORIDE 600; 310; 30; 20 MG/100ML; MG/100ML; MG/100ML; MG/100ML
INJECTION, SOLUTION INTRAVENOUS CONTINUOUS
Status: DISCONTINUED | OUTPATIENT
Start: 2021-08-05 | End: 2021-08-06

## 2021-08-05 RX ORDER — ROCURONIUM BROMIDE 10 MG/ML
INJECTION, SOLUTION INTRAVENOUS PRN
Status: DISCONTINUED | OUTPATIENT
Start: 2021-08-05 | End: 2021-08-05 | Stop reason: SDUPTHER

## 2021-08-05 RX ORDER — AMIODARONE HYDROCHLORIDE 200 MG/1
200 TABLET ORAL DAILY
Status: DISCONTINUED | OUTPATIENT
Start: 2021-08-06 | End: 2021-08-07 | Stop reason: HOSPADM

## 2021-08-05 RX ORDER — GLYCOPYRROLATE 1 MG/5 ML
SYRINGE (ML) INTRAVENOUS PRN
Status: DISCONTINUED | OUTPATIENT
Start: 2021-08-05 | End: 2021-08-05 | Stop reason: SDUPTHER

## 2021-08-05 RX ORDER — HYDRALAZINE HYDROCHLORIDE 25 MG/1
25 TABLET, FILM COATED ORAL EVERY 8 HOURS SCHEDULED
Status: DISCONTINUED | OUTPATIENT
Start: 2021-08-05 | End: 2021-08-07 | Stop reason: HOSPADM

## 2021-08-05 RX ORDER — FUROSEMIDE 40 MG/1
40 TABLET ORAL DAILY
Status: DISCONTINUED | OUTPATIENT
Start: 2021-08-05 | End: 2021-08-05

## 2021-08-05 RX ORDER — SODIUM CHLORIDE, SODIUM LACTATE, POTASSIUM CHLORIDE, CALCIUM CHLORIDE 600; 310; 30; 20 MG/100ML; MG/100ML; MG/100ML; MG/100ML
INJECTION, SOLUTION INTRAVENOUS CONTINUOUS
Status: DISCONTINUED | OUTPATIENT
Start: 2021-08-05 | End: 2021-08-05

## 2021-08-05 RX ORDER — PROPOFOL 10 MG/ML
INJECTION, EMULSION INTRAVENOUS PRN
Status: DISCONTINUED | OUTPATIENT
Start: 2021-08-05 | End: 2021-08-05 | Stop reason: SDUPTHER

## 2021-08-05 RX ORDER — LIDOCAINE HYDROCHLORIDE 20 MG/ML
INJECTION, SOLUTION EPIDURAL; INFILTRATION; INTRACAUDAL; PERINEURAL PRN
Status: DISCONTINUED | OUTPATIENT
Start: 2021-08-05 | End: 2021-08-05 | Stop reason: SDUPTHER

## 2021-08-05 RX ORDER — SENNA AND DOCUSATE SODIUM 50; 8.6 MG/1; MG/1
1 TABLET, FILM COATED ORAL 2 TIMES DAILY
Status: DISCONTINUED | OUTPATIENT
Start: 2021-08-05 | End: 2021-08-07 | Stop reason: HOSPADM

## 2021-08-05 RX ORDER — SODIUM CHLORIDE 9 MG/ML
INJECTION, SOLUTION INTRAVENOUS CONTINUOUS PRN
Status: DISCONTINUED | OUTPATIENT
Start: 2021-08-05 | End: 2021-08-05 | Stop reason: SDUPTHER

## 2021-08-05 RX ORDER — SUCCINYLCHOLINE/SOD CL,ISO/PF 200MG/10ML
SYRINGE (ML) INTRAVENOUS PRN
Status: DISCONTINUED | OUTPATIENT
Start: 2021-08-05 | End: 2021-08-05 | Stop reason: SDUPTHER

## 2021-08-05 RX ORDER — FENTANYL CITRATE 50 UG/ML
INJECTION, SOLUTION INTRAMUSCULAR; INTRAVENOUS PRN
Status: DISCONTINUED | OUTPATIENT
Start: 2021-08-05 | End: 2021-08-05 | Stop reason: SDUPTHER

## 2021-08-05 RX ORDER — SODIUM CHLORIDE 0.9 % (FLUSH) 0.9 %
5-40 SYRINGE (ML) INJECTION PRN
Status: DISCONTINUED | OUTPATIENT
Start: 2021-08-05 | End: 2021-08-07 | Stop reason: HOSPADM

## 2021-08-05 RX ORDER — SPIRONOLACTONE 25 MG/1
25 TABLET ORAL DAILY
Status: DISCONTINUED | OUTPATIENT
Start: 2021-08-05 | End: 2021-08-07 | Stop reason: HOSPADM

## 2021-08-05 RX ORDER — VANCOMYCIN HYDROCHLORIDE 1 G/20ML
INJECTION, POWDER, LYOPHILIZED, FOR SOLUTION INTRAVENOUS PRN
Status: DISCONTINUED | OUTPATIENT
Start: 2021-08-05 | End: 2021-08-05 | Stop reason: ALTCHOICE

## 2021-08-05 RX ORDER — DEXAMETHASONE SODIUM PHOSPHATE 10 MG/ML
8 INJECTION, SOLUTION INTRAMUSCULAR; INTRAVENOUS ONCE
Status: DISCONTINUED | OUTPATIENT
Start: 2021-08-05 | End: 2021-08-05 | Stop reason: HOSPADM

## 2021-08-05 RX ORDER — ONDANSETRON 4 MG/1
4 TABLET, ORALLY DISINTEGRATING ORAL EVERY 8 HOURS PRN
Status: DISCONTINUED | OUTPATIENT
Start: 2021-08-05 | End: 2021-08-07 | Stop reason: HOSPADM

## 2021-08-05 RX ORDER — MORPHINE SULFATE 2 MG/ML
1 INJECTION, SOLUTION INTRAMUSCULAR; INTRAVENOUS EVERY 5 MIN PRN
Status: DISCONTINUED | OUTPATIENT
Start: 2021-08-05 | End: 2021-08-05 | Stop reason: HOSPADM

## 2021-08-05 RX ORDER — DEXAMETHASONE SODIUM PHOSPHATE 4 MG/ML
INJECTION, SOLUTION INTRA-ARTICULAR; INTRALESIONAL; INTRAMUSCULAR; INTRAVENOUS; SOFT TISSUE PRN
Status: DISCONTINUED | OUTPATIENT
Start: 2021-08-05 | End: 2021-08-05 | Stop reason: SDUPTHER

## 2021-08-05 RX ORDER — ISOSORBIDE DINITRATE 10 MG/1
20 TABLET ORAL DAILY
Status: DISCONTINUED | OUTPATIENT
Start: 2021-08-06 | End: 2021-08-07 | Stop reason: HOSPADM

## 2021-08-05 RX ORDER — SODIUM CHLORIDE 0.9 % (FLUSH) 0.9 %
5-40 SYRINGE (ML) INJECTION EVERY 12 HOURS SCHEDULED
Status: DISCONTINUED | OUTPATIENT
Start: 2021-08-05 | End: 2021-08-05 | Stop reason: HOSPADM

## 2021-08-05 RX ORDER — ONDANSETRON 2 MG/ML
4 INJECTION INTRAMUSCULAR; INTRAVENOUS EVERY 6 HOURS PRN
Status: DISCONTINUED | OUTPATIENT
Start: 2021-08-05 | End: 2021-08-07 | Stop reason: HOSPADM

## 2021-08-05 RX ORDER — FUROSEMIDE 40 MG/1
40 TABLET ORAL DAILY
Status: DISCONTINUED | OUTPATIENT
Start: 2021-08-07 | End: 2021-08-06

## 2021-08-05 RX ORDER — OXYCODONE HYDROCHLORIDE 5 MG/1
5 TABLET ORAL EVERY 4 HOURS PRN
Status: DISCONTINUED | OUTPATIENT
Start: 2021-08-05 | End: 2021-08-07 | Stop reason: HOSPADM

## 2021-08-05 RX ORDER — SODIUM CHLORIDE 0.9 % (FLUSH) 0.9 %
5-40 SYRINGE (ML) INJECTION EVERY 12 HOURS SCHEDULED
Status: DISCONTINUED | OUTPATIENT
Start: 2021-08-05 | End: 2021-08-07 | Stop reason: HOSPADM

## 2021-08-05 RX ORDER — MIDAZOLAM HYDROCHLORIDE 1 MG/ML
INJECTION INTRAMUSCULAR; INTRAVENOUS PRN
Status: DISCONTINUED | OUTPATIENT
Start: 2021-08-05 | End: 2021-08-05 | Stop reason: SDUPTHER

## 2021-08-05 RX ORDER — SODIUM CHLORIDE 9 MG/ML
25 INJECTION, SOLUTION INTRAVENOUS PRN
Status: DISCONTINUED | OUTPATIENT
Start: 2021-08-05 | End: 2021-08-07 | Stop reason: HOSPADM

## 2021-08-05 RX ORDER — MEPERIDINE HYDROCHLORIDE 25 MG/ML
12.5 INJECTION INTRAMUSCULAR; INTRAVENOUS; SUBCUTANEOUS EVERY 5 MIN PRN
Status: DISCONTINUED | OUTPATIENT
Start: 2021-08-05 | End: 2021-08-05 | Stop reason: HOSPADM

## 2021-08-05 RX ORDER — SODIUM CHLORIDE 9 MG/ML
25 INJECTION, SOLUTION INTRAVENOUS PRN
Status: DISCONTINUED | OUTPATIENT
Start: 2021-08-05 | End: 2021-08-05 | Stop reason: HOSPADM

## 2021-08-05 RX ORDER — ETOMIDATE 2 MG/ML
INJECTION INTRAVENOUS PRN
Status: DISCONTINUED | OUTPATIENT
Start: 2021-08-05 | End: 2021-08-05 | Stop reason: SDUPTHER

## 2021-08-05 RX ORDER — ONDANSETRON 2 MG/ML
INJECTION INTRAMUSCULAR; INTRAVENOUS PRN
Status: DISCONTINUED | OUTPATIENT
Start: 2021-08-05 | End: 2021-08-05 | Stop reason: SDUPTHER

## 2021-08-05 RX ORDER — ACETAMINOPHEN 325 MG/1
650 TABLET ORAL EVERY 6 HOURS
Status: DISCONTINUED | OUTPATIENT
Start: 2021-08-05 | End: 2021-08-07 | Stop reason: HOSPADM

## 2021-08-05 RX ORDER — SODIUM CHLORIDE 0.9 % (FLUSH) 0.9 %
5-40 SYRINGE (ML) INJECTION PRN
Status: DISCONTINUED | OUTPATIENT
Start: 2021-08-05 | End: 2021-08-05 | Stop reason: HOSPADM

## 2021-08-05 RX ORDER — CELECOXIB 100 MG/1
200 CAPSULE ORAL ONCE
Status: COMPLETED | OUTPATIENT
Start: 2021-08-05 | End: 2021-08-05

## 2021-08-05 RX ORDER — ONDANSETRON 2 MG/ML
4 INJECTION INTRAMUSCULAR; INTRAVENOUS
Status: DISCONTINUED | OUTPATIENT
Start: 2021-08-05 | End: 2021-08-05 | Stop reason: HOSPADM

## 2021-08-05 RX ORDER — ACETAMINOPHEN 500 MG
1000 TABLET ORAL ONCE
Status: COMPLETED | OUTPATIENT
Start: 2021-08-05 | End: 2021-08-05

## 2021-08-05 RX ADMIN — FENTANYL CITRATE 50 MCG: 50 INJECTION, SOLUTION INTRAMUSCULAR; INTRAVENOUS at 10:45

## 2021-08-05 RX ADMIN — SODIUM CHLORIDE, POTASSIUM CHLORIDE, SODIUM LACTATE AND CALCIUM CHLORIDE: 600; 310; 30; 20 INJECTION, SOLUTION INTRAVENOUS at 15:04

## 2021-08-05 RX ADMIN — SODIUM CHLORIDE: 9 INJECTION, SOLUTION INTRAVENOUS at 11:19

## 2021-08-05 RX ADMIN — MORPHINE SULFATE 2 MG: 2 INJECTION, SOLUTION INTRAMUSCULAR; INTRAVENOUS at 13:06

## 2021-08-05 RX ADMIN — DOCUSATE SODIUM 50 MG AND SENNOSIDES 8.6 MG 1 TABLET: 8.6; 5 TABLET, FILM COATED ORAL at 20:35

## 2021-08-05 RX ADMIN — FUROSEMIDE 40 MG: 40 TABLET ORAL at 20:35

## 2021-08-05 RX ADMIN — PROPOFOL 50 MG: 10 INJECTION, EMULSION INTRAVENOUS at 11:10

## 2021-08-05 RX ADMIN — PROPOFOL 50 MG: 10 INJECTION, EMULSION INTRAVENOUS at 10:46

## 2021-08-05 RX ADMIN — HYDRALAZINE HYDROCHLORIDE 25 MG: 25 TABLET, FILM COATED ORAL at 22:21

## 2021-08-05 RX ADMIN — MORPHINE SULFATE 2 MG: 2 INJECTION, SOLUTION INTRAMUSCULAR; INTRAVENOUS at 13:19

## 2021-08-05 RX ADMIN — Medication 2000 MG: at 18:00

## 2021-08-05 RX ADMIN — OXYCODONE 10 MG: 5 TABLET ORAL at 15:03

## 2021-08-05 RX ADMIN — Medication 3 MG: at 12:30

## 2021-08-05 RX ADMIN — ACETAMINOPHEN 650 MG: 325 TABLET ORAL at 18:00

## 2021-08-05 RX ADMIN — FENTANYL CITRATE 100 MCG: 50 INJECTION, SOLUTION INTRAMUSCULAR; INTRAVENOUS at 10:11

## 2021-08-05 RX ADMIN — PROPOFOL 50 MG: 10 INJECTION, EMULSION INTRAVENOUS at 11:01

## 2021-08-05 RX ADMIN — MORPHINE SULFATE 2 MG: 2 INJECTION, SOLUTION INTRAMUSCULAR; INTRAVENOUS at 13:34

## 2021-08-05 RX ADMIN — ETOMIDATE 20 MG: 2 INJECTION, SOLUTION INTRAVENOUS at 10:22

## 2021-08-05 RX ADMIN — ACETAMINOPHEN 1000 MG: 500 TABLET ORAL at 09:13

## 2021-08-05 RX ADMIN — TRANEXAMIC ACID 1000 MG: 1 INJECTION, SOLUTION INTRAVENOUS at 13:25

## 2021-08-05 RX ADMIN — Medication 0.6 MG: at 12:30

## 2021-08-05 RX ADMIN — LIDOCAINE HYDROCHLORIDE 100 MG: 20 INJECTION, SOLUTION EPIDURAL; INFILTRATION; INTRACAUDAL; PERINEURAL at 10:22

## 2021-08-05 RX ADMIN — LABETALOL HYDROCHLORIDE 5 MG: 5 INJECTION INTRAVENOUS at 11:07

## 2021-08-05 RX ADMIN — FENTANYL CITRATE 50 MCG: 50 INJECTION, SOLUTION INTRAMUSCULAR; INTRAVENOUS at 10:38

## 2021-08-05 RX ADMIN — MIDAZOLAM 2 MG: 1 INJECTION INTRAMUSCULAR; INTRAVENOUS at 10:11

## 2021-08-05 RX ADMIN — SPIRONOLACTONE 25 MG: 25 TABLET ORAL at 20:35

## 2021-08-05 RX ADMIN — CELECOXIB 200 MG: 100 CAPSULE ORAL at 09:13

## 2021-08-05 RX ADMIN — DEXAMETHASONE SODIUM PHOSPHATE 10 MG: 4 INJECTION, SOLUTION INTRAMUSCULAR; INTRAVENOUS at 11:17

## 2021-08-05 RX ADMIN — ONDANSETRON 4 MG: 2 INJECTION INTRAMUSCULAR; INTRAVENOUS at 11:17

## 2021-08-05 RX ADMIN — SODIUM CHLORIDE: 9 INJECTION, SOLUTION INTRAVENOUS at 09:05

## 2021-08-05 RX ADMIN — ROCURONIUM BROMIDE 50 MG: 10 INJECTION, SOLUTION INTRAVENOUS at 10:38

## 2021-08-05 RX ADMIN — ROCURONIUM BROMIDE 20 MG: 10 INJECTION, SOLUTION INTRAVENOUS at 11:06

## 2021-08-05 RX ADMIN — TRANEXAMIC ACID 1000 MG: 1 INJECTION, SOLUTION INTRAVENOUS at 10:26

## 2021-08-05 RX ADMIN — FENTANYL CITRATE 50 MCG: 50 INJECTION, SOLUTION INTRAMUSCULAR; INTRAVENOUS at 11:05

## 2021-08-05 RX ADMIN — OXYCODONE 10 MG: 5 TABLET ORAL at 20:43

## 2021-08-05 RX ADMIN — Medication 2000 MG: at 10:19

## 2021-08-05 RX ADMIN — Medication 140 MG: at 10:22

## 2021-08-05 RX ADMIN — FAMOTIDINE 20 MG: 10 INJECTION, SOLUTION INTRAVENOUS at 20:35

## 2021-08-05 ASSESSMENT — PAIN DESCRIPTION - PAIN TYPE
TYPE: SURGICAL PAIN

## 2021-08-05 ASSESSMENT — PULMONARY FUNCTION TESTS
PIF_VALUE: 19
PIF_VALUE: 0
PIF_VALUE: 21
PIF_VALUE: 2
PIF_VALUE: 20
PIF_VALUE: 21
PIF_VALUE: 21
PIF_VALUE: 20
PIF_VALUE: 21
PIF_VALUE: 19
PIF_VALUE: 18
PIF_VALUE: 21
PIF_VALUE: 19
PIF_VALUE: 20
PIF_VALUE: 21
PIF_VALUE: 17
PIF_VALUE: 18
PIF_VALUE: 21
PIF_VALUE: 19
PIF_VALUE: 3
PIF_VALUE: 21
PIF_VALUE: 20
PIF_VALUE: 19
PIF_VALUE: 20
PIF_VALUE: 21
PIF_VALUE: 21
PIF_VALUE: 19
PIF_VALUE: 21
PIF_VALUE: 21
PIF_VALUE: 4
PIF_VALUE: 21
PIF_VALUE: 21
PIF_VALUE: 19
PIF_VALUE: 21
PIF_VALUE: 20
PIF_VALUE: 21
PIF_VALUE: 17
PIF_VALUE: 19
PIF_VALUE: 20
PIF_VALUE: 21
PIF_VALUE: 21
PIF_VALUE: 20
PIF_VALUE: 21
PIF_VALUE: 22
PIF_VALUE: 21
PIF_VALUE: 19
PIF_VALUE: 21
PIF_VALUE: 19
PIF_VALUE: 21
PIF_VALUE: 20
PIF_VALUE: 19
PIF_VALUE: 4
PIF_VALUE: 21
PIF_VALUE: 19
PIF_VALUE: 2
PIF_VALUE: 21
PIF_VALUE: 21
PIF_VALUE: 20
PIF_VALUE: 21
PIF_VALUE: 21
PIF_VALUE: 1
PIF_VALUE: 24
PIF_VALUE: 21
PIF_VALUE: 20
PIF_VALUE: 21
PIF_VALUE: 20
PIF_VALUE: 21

## 2021-08-05 ASSESSMENT — PAIN DESCRIPTION - PROGRESSION
CLINICAL_PROGRESSION: GRADUALLY WORSENING
CLINICAL_PROGRESSION: GRADUALLY IMPROVING
CLINICAL_PROGRESSION: GRADUALLY WORSENING
CLINICAL_PROGRESSION: GRADUALLY WORSENING
CLINICAL_PROGRESSION: GRADUALLY IMPROVING
CLINICAL_PROGRESSION: GRADUALLY IMPROVING

## 2021-08-05 ASSESSMENT — PAIN SCALES - GENERAL
PAINLEVEL_OUTOF10: 7
PAINLEVEL_OUTOF10: 6
PAINLEVEL_OUTOF10: 7
PAINLEVEL_OUTOF10: 4
PAINLEVEL_OUTOF10: 8
PAINLEVEL_OUTOF10: 7
PAINLEVEL_OUTOF10: 7
PAINLEVEL_OUTOF10: 6
PAINLEVEL_OUTOF10: 7
PAINLEVEL_OUTOF10: 5
PAINLEVEL_OUTOF10: 8
PAINLEVEL_OUTOF10: 6

## 2021-08-05 ASSESSMENT — PAIN DESCRIPTION - DESCRIPTORS
DESCRIPTORS: DISCOMFORT
DESCRIPTORS: ACHING
DESCRIPTORS: DISCOMFORT
DESCRIPTORS: ACHING
DESCRIPTORS: ACHING

## 2021-08-05 ASSESSMENT — PAIN DESCRIPTION - ONSET
ONSET: ON-GOING

## 2021-08-05 ASSESSMENT — PAIN DESCRIPTION - LOCATION
LOCATION: HIP

## 2021-08-05 ASSESSMENT — PAIN DESCRIPTION - ORIENTATION
ORIENTATION: RIGHT

## 2021-08-05 ASSESSMENT — PAIN - FUNCTIONAL ASSESSMENT
PAIN_FUNCTIONAL_ASSESSMENT: PREVENTS OR INTERFERES SOME ACTIVE ACTIVITIES AND ADLS
PAIN_FUNCTIONAL_ASSESSMENT: 0-10
PAIN_FUNCTIONAL_ASSESSMENT: 0-10

## 2021-08-05 ASSESSMENT — LIFESTYLE VARIABLES: SMOKING_STATUS: 1

## 2021-08-05 ASSESSMENT — PAIN DESCRIPTION - FREQUENCY
FREQUENCY: CONTINUOUS

## 2021-08-05 NOTE — PROGRESS NOTES
Occupational Therapy  OCCUPATIONAL THERAPY INITIAL EVALUATION  [facility] [address]    Date: 2021     Patient Name: Parminder Bermeo  MRN: [id number]  : 1960  Room: [address] [place] [address]    Evaluating OT: Renzo Santa, OTR/L - [id number]    Referring Provider: Marquez Barahona MD  Specific Provider Orders/Date: \"OT eval and treat\" - 2021    Diagnosis: Primary osteoarthritis of right hip [M16.11]    Surgery: Patient underwent R THOM on 2021. Pertinent Medical History: CHF, CAD, atrial flutter, h/o L THOM (2016), HTN     Precautions: fall risk, posterolateral hip precautions, FWBAT    Assessment of Current Deficits:    [x] Functional mobility   [x]ADLs  [x] Strength               [x]Cognition   [x] Functional transfers   [x] IADLs         [x] Safety Awareness   [x]Endurance   [] Fine Coordination              [x] Balance      [] Vision/perception   [x]Sensation    []Gross Motor Coordination  [] ROM  [] Delirium                   [] Motor Control     OT PLAN OF CARE   OT POC is based on physician orders, patient diagnosis, and results of clinical assessment.   Frequency/Duration 2-5 days/week for 2 weeks PRN   Specific OT Treatment Interventions to Include:   * Instruction/training on adapted ADL techniques and AE recommendations to increase functional independence within precautions       * Training on energy conservation strategies, correct breathing pattern and techniques to improve independence/tolerance for self-care routine  * Functional transfer/mobility training/DME recommendations for increased independence, safety, and fall prevention  * Patient/Family education to increase follow through with safety techniques and functional independence  * Recommendation of environmental modifications for increased safety with functional transfers/mobility and ADLs  * Therapeutic exercise to improve motor endurance, ROM, and functional strength for ADLs/functional transfers  * Therapeutic activities to facilitate/challenge dynamic balance, stand tolerance for increased safety and independence with ADLs  * Neuro-muscular re-education: facilitation of righting/equilibrium reactions, midline orientation, scapular stability/mobility, normalization of muscle tone, and facilitation of volitional active controled movement    Recommended Adaptive Equipment: TBD    Home Living: Patient lives alone in a two-floor home; patient's bedroom and full bathroom are on the second floor. Bathroom Setup: half bathroom on the main living level (with elevated toilet); tub shower and elevated toilet on second floor  Equipment Owned: walker, BSC, reacher, sock aid, long-handled shoe horn    Prior Level of Function (PLOF): Patient was independent with ADLs, IADLs, and functional mobility prior to this hospitalization. Patient noted that he will have consistent assistance from family members, as needed, upon discharge. Driving: Yes  Occupation: Patient is a retired mailman. Pain Level: Patient reported experiencing pain in his R hip, which he rated 2-3 out of 10; nursing aware. Cognition: Patient alert and oriented x3. WFL command follow demonstrated. Patient pleasant, cooperative, and motivated to return to [facility] and home environment. Memory: WFL  Sequencing: WFL  Problem Solving: WFL  Judgement/Safety: WFL grossly   Impulsivity demonstrated occasionally. Functional Assessment:  AM-PAC Daily Activity Raw Score: 17/24   Initial Eval Status  Date: 8/5/2021 Treatment Status  Date:  Short Term Goals = Long Term Goals   Feeding Independent     Grooming CGA  Mod I  (seated/standing at sink)   UB Dressing SBA  Mod I  (including item retrieval)   LB Dressing Mod A  Mod I (excluding DEANGELO hose)- while demonstrating Good adherence to posterolateral hip precautions and Good understanding of AE use.    Bathing Mod A  Mod I (excluding DEANGELO hose)- while demonstrating Good adherence to posterolateral hip precautions and Good understanding of AE use. Toileting Min A  Mod I   Bed Mobility  Supine-to-Sit: Min A  Sit-to-Supine: Min A      Functional Transfers Sit-to-Stand: CGA   from EOB  (elevated surface). Impulsivity demonstrated with initial sit-to-stand. Independent   Functional Mobility CGA   (with walker) within patient's room. Mod I with functional mobility (with device, as needed/appropriate) in order to maximize independence with ADLs/IADLs and other functional tasks. Balance Sitting: Good  (at EOB)  Standing: Fair- to Fair  (with walker)  Fair+ dynamic standing balance during completion of ADLs/IADLs and other functional tasks. Activity Tolerance Fair+  Patient will demonstrate Good understanding and consistent implementation of energy conservation techniques and work simplification techniques into ADL/IADL routines. Visual/  Perceptual WFL  Patient wears glasses. N/A     Additional Long-Term Goal: Patient will increase functional independence to PLOF in order to allow patient to live in least restrictive environment. Strength: ROM: Additional Information:    R UE  WFL WFL    L UE WFL  WFL      Hearing: WFL  Sensation: No complaints of numbness/tingling in B UEs. Tone: WFL  Edema: No    Comments: RN approved patient's participation in [address] [place] [address] activities. Upon arrival, patient supine in bed. At end of session, patient supine in bed (per patient request secondary to fatigue) with call light and phone within reach and all lines and tubes intact. Patient would benefit from continued skilled OT to increase safety and independence with completion of ADL/IADL tasks for functional independence and quality of life. Treatment: OT treatment provided this date included:    Instruction/training on safety and adapted techniques for completion of ADLs.   Instruction/training on safe functional mobility/transfer techniques.      Instruction/training regarding posterolateral hip precautions and implications of these on ADLs, bed mobility, functional transfers/mobility, and IADLs. Patient education provided regardin) importance of having staff assistance with ADLs and other OOB activities to prevent falls/injury during hospitalization. Patient verbalized understanding. Further skilled OT treatment indicated to increase patient's safety and independence with completion of ADL/IADL tasks in order to maximize patient's functional independence and quality of life. Rehab Potential: Good for established goals. Patient / Family Goal: Patient noted that he anticipates returning home upon discharge. Patient and/or family were instructed on functional diagnosis, prognosis/goals, and OT plan of care. Demonstrated Good understanding. Eval Complexity: Low    Time In: 1615  Time Out: 1645  Total Treatment Time: 15 minutes      Minutes Units   OT Eval Low 60693 15 1   OT Eval Medium 70282     OT Eval High 71531     OT Re-Eval [id number]     Therapeutic Ex 93568     Therapeutic Activities 19747 15 1   ADL/Self Care 24116     Orthotic Management 52442     Neuro Re-Ed 49450     Non-Billable Time N/A ---     Evaluation time includes thorough review of current medical information, gathering information on past medical history/social history and prior level of function, completion of standardized testing/informal observation of tasks, assessment of data, and education on plan of care and goals. Karlie Ramirez, JACKSONR/L  License Number: [id number]

## 2021-08-05 NOTE — BRIEF OP NOTE
Brief Postoperative Note      Patient: Judie Billings  YOB: 1960  MRN: [id number]    Date of Procedure: 8/5/2021    Pre-Op Diagnosis: Severe right hip OSTEOARTHRITIS    Post-Op Diagnosis: Same       Procedure(s):  RIGHT TOTAL HIP ARTHROPLASTY    Surgeon(s):  Rito Orona MD    Assistant:  First Assistant: Khurram Das RN  Physician Assistant: JERE Simms    Anesthesia: General    Estimated Blood Loss (mL): 738     Complications: None    Specimens:   ID Type Source Tests Collected by Time Destination   A : RIGHT HIP BONE Bone Bone SURGICAL PATHOLOGY Rito Orona MD 8/5/2021 1110        Implants:  Implant Name Type Inv.  Item Serial No.  Lot No. LRB No. Used Action   SHELL ACET SZ G [id number] HIP PPS LIMIT H G7  SHELL ACET SZ G [id number] HIP PPS LIMIT H G7  [employer]- [id number] Right 1 Implanted   LINER ACET 36 MM HIP POLYETH G7 VIVACIT E  LINER ACET 36 MM HIP POLYETH G7 VIVACIT E  [employer]- [id number] Right 1 Implanted   STEM FEM SZ 13 L146MM HI OFFSET DST HIP TI PPS TYP 1 TAPR  STEM FEM SZ 13 L146MM HI OFFSET DST HIP TI PPS TYP 1 TAPR  [employer]- [id number] Right 1 Implanted   SLEEVE FEM STD OFFSET HIP TYP 1 TAPR FOR CERAMIC BIOLOX  SLEEVE FEM STD OFFSET HIP TYP 1 TAPR FOR CERAMIC BIOLOX  [employer]- [id number] Right 1 Implanted   HEAD FEM [id number] HIP BIOLOX DELT OPT FOR G7 ACET SYS  HEAD FEM [id number] HIP BIOLOX DELT OPT FOR G7 ACET SYS  [employer]- [id number] Right 1 Implanted         Drains: * No LDAs found *    Findings: Bone-on-bone DJD with synovitis    Electronically signed by Rito Orona MD on 8/5/2021 at 12:25 PM

## 2021-08-05 NOTE — PROGRESS NOTES
PT RECEIVED IN PACU FROM SURGERY REPORT RECEIVED ASSESSMENT AND VS OBTAINED , ART LINE LEVELED AND ZEROED WITH GOOD WAVE FORM  O2 CHANGED TO 4L N/C ON ARRIVAL ABDUCTOR PILLOW IN PLACE .  1300 MEDICATING FOR PAIN  [address] XRAY HERE AND DOING POST OP HIP XRAY CONTINUE TO MEDICATE FORM PAIN SEE MAR  1330 DR LUGO UPDATED VSS, CHECKED PT OK TO DISCONTINUE ARTERIAL LINE  1334 RIGHT RADIAL ARTERIAL ZAHEER DISCONTINUED PRESSURE HELD X 10 MIN , 1350NO HEMATOMA OR BRUISING NOTED , POST REMOVAL, CAP REFILL BRISK PT HAS FULL SENSATION ABLE TO MOVE FINGERS PRESSURE DRESSING APPLIED  1400 REPORT CALLED TO FLOOR RN AT THIS TIME TRANSPORT REQUEST PLACED,   1410 RECHECK ON RIGHT RADIAL DRESSING DRY NO CHANGE IN ASSESSMENT FROM PREVIOUS

## 2021-08-05 NOTE — ANESTHESIA PRE PROCEDURE
Department of Anesthesiology  Preprocedure Note       Name:  Chivo Hou   Age:  64 y.o.  :  1960                                          MRN:  [id number]         Date:  2021      Surgeon: Garrard Loop    Procedure:Rt. Total Hip Arthroplasty  Medications prior to admission:   Prior to Admission medications    Medication Sig Start Date End Date Taking? Authorizing Provider   hydrALAZINE (APRESOLINE) 25 MG tablet Take 1 tablet by mouth every 8 hours 21   Melinda Killian MD   metoprolol succinate (TOPROL XL) 25 MG extended release tablet TAKE 3 TABLETS BY MOUTH EVERY NIGHT 21   Historical Provider, MD   furosemide (LASIX) 40 MG tablet Take 1 tablet by mouth daily 21   Melinda Killian MD   spironolactone (ALDACTONE) 25 MG tablet Take 1 tablet by mouth daily 21   Melinda Killian MD   isosorbide dinitrate (ISORDIL) 20 MG tablet TAKE 1 TABLET BY MOUTH THREE TIMES A DAY 21   Melinda Killian MD   ELIQUIS 5 MG TABS tablet TAKE 1 TABLET BY MOUTH TWICE A DAY 21   Melinda Killian MD   amiodarone (CORDARONE) 200 MG tablet Take 1 tablet by mouth daily 20   Melinda Killian MD       Current medications:    No current facility-administered medications for this visit. No current outpatient medications on file.      Facility-Administered Medications Ordered in Other Visits   Medication Dose Route Frequency Provider Last Rate Last Admin    0.9 % sodium chloride infusion  25 mL Intravenous PRN Adore Saint PA        ceFAZolin (ANCEF) 2000 mg in sterile water 20 mL IV syringe  2,000 mg Intravenous On Call to OR Leesa Saint PA        dexamethasone (PF) (DECADRON) injection 8 mg  8 mg Intravenous Once Leesa Saint PA        lactated ringers infusion   Intravenous Continuous Leesa Saint PA        sodium chloride flush 0.9 % injection 5-40 mL  5-40 mL Intravenous 2 times per day Leesa Saint PA        sodium chloride flush 0.9 % injection 5-40 mL  5-40 mL Intravenous PRN JERE Dhaliwal        tranexamic acid (CYKLOKAPRON) 1,000 mg in dextrose 5 % 100 mL IVPB  1,000 mg Intravenous On Call to [address] [place] [address]           Allergies:     Allergies   Allergen Reactions    Ace Inhibitors Swelling    Lisinopril Swelling     Angioedema    5-Alpha Reductase Inhibitors        Problem List:    Patient Active Problem List   Diagnosis Code    Primary osteoarthritis of left hip M16.12    Essential hypertension I10    Acute blood loss as cause of postoperative anemia D62    Osteoarthritis of multiple joints M15.9    New onset atrial flutter (HCC) I48.92    Acute decompensated heart failure (HCC) I50.9    New onset of congestive heart failure (HCC) I50.9    Atrial flutter with rapid ventricular response (HCC) I48.92    Acidosis E87.2    Elevated LFTs R79.89    CAD in native artery I25.10    Acute diastolic CHF (congestive heart failure) (HCC) I50.31    Atrial flutter (HCC) I48.92    Nonischemic cardiomyopathy (HCC) I42.8    Ventricular tachycardia ([facility]) I47.2    Defibrillator discharge Z45.02    Primary osteoarthritis of right hip M16.11       Past Medical History:        Diagnosis Date    Arthritis     right hip    CAD (coronary artery disease)     CHF (congestive heart failure) ([address] 2020    Hypertension     New onset atrial flutter ([address] 2020    Nonischemic cardiomyopathy ([address] 08/2020    Seasonal allergies     VF (ventricular fibrillation) [facility] 08/2020       Past Surgical History:        Procedure Laterality Date    BACK SURGERY  2012    lumbar area L4 L5    CARDIAC DEFIBRILLATOR PLACEMENT  08/27/2020    Dual ICD    (medtronic)    Dr. Yisel Velasquez, ESOPHAGUS     Ivin Squire Right 1990's    inguinal    HIP SURGERY  09/13/2016    left total; hip arthroplasty    JOINT REPLACEMENT Left 2013    hip replacement    PACEMAKER PLACEMENT  2020    AICD    WRIST SURGERY Right        Social History:    Social History     Tobacco Use  Smoking status: Former Smoker     Packs/day: 0.25     Years: 15.00     Pack years: 3.75     Types: Cigarettes     Quit date: 8/15/2020     Years since quittin.9    Smokeless tobacco: Never Used    Tobacco comment: cigar x1 monthly   Substance Use Topics    Alcohol use: Yes     Alcohol/week: 3.0 standard drinks     Types: 3 Shots of liquor per week                                Counseling given: Not Answered  Comment: cigar x1 monthly      Vital Signs (Current): There were no vitals filed for this visit. BP Readings from Last 3 Encounters:   21 114/74   21 109/64   06/10/21 131/79       NPO Status:  >12 hrs                                                                               BMI:   Wt Readings from Last 3 Encounters:   21 232 lb (105.2 kg)   21 232 lb (105.2 kg)   06/10/21 230 lb (104.3 kg)     There is no height or weight on file to calculate BMI.    CBC:   Lab Results   Component Value Date    WBC 3.1 2021    RBC 3.93 2021    HGB 13.8 2021    HCT 42.1 2021    .1 2021    RDW 13.1 2021     2021       CMP:   Lab Results   Component Value Date     2021    K 4.3 2021    K 4.2 2020     2021    CO2 28 2021    BUN 17 2021    CREATININE 1.4 2021    GFRAA >60 2021    LABGLOM >60 2021    GLUCOSE 107 2021    PROT 7.6 2021    CALCIUM 9.7 2021    BILITOT 0.8 2021    ALKPHOS 68 2021    AST 53 2021    ALT 44 2021       POC Tests: No results for input(s): POCGLU, POCNA, POCK, POCCL, POCBUN, POCHEMO, POCHCT in the last 72 hours.     Coags:   Lab Results   Component Value Date    PROTIME 15.4 2020    INR 1.4 2020    APTT 31.5 08/15/2020       HCG (If Applicable): No results found for: PREGTESTUR, PREGSERUM, HCG, HCGQUANT     ABGs: No results found for: PHART, PO2ART, atrium. No evidence of thrombus within left atrium or appendage. Normal right ventricular size and function. Moderate mitral regurgitation with centrally directed jet. No hemodynamically significant aortic stenosis is present. Mild tricuspid regurgitation. RVSP is 27 mmHg. Normal estimated PA systolic pressure. No evidence for hemodynamically significant pericardial effusion. IMPRESSION:  1. Questionable 50% discrete distal RCA stenosis versus most likely  overlap of the proximal segment of PLV and PDA. 2.  Absence of angiographic stenosis in the left main, LAD or left  circumflex. 3.  Elevated LVEDP at 23 mmHg.        Procedure(s):  RIGHT TOTAL HIP ARTHROPLASTY   +++WAGNER+++  ECHO Transesophageal  Order: [id number]  Status:  Edited Result - FINAL   Visible to patient:  No (not released) Next appt:  08/30/2021 at 08:45 AM in Cardiac Electrophysiology (SCHEDULE, DEVICE CLINIC [facility])  · 0 Result Notes  Details    Reading Physician Reading Date Result Priority   Nikita Ramsay MD  [phone] 8/19/2020    Unknown Provider Result 8/19/2020       Narrative & Impression  Transesophageal Echocardiography Report ([facility])      Demographics      Patient Name    Maryellen RAMIREZ  Gender            Male      Medical Record  [id number]    Room Number       8720   Number      Account #       [de-identified]   Procedure Date    08/19/2020      Corporate ID                Ordering          Gio Sahu MD                               Physician      Accession       [id number]  Referring         Pradeep Seun   Number                      Physician      Date of Birth   1960  Sonographer       Danilo Patrick [facility]      Age             64 year(s)  Interpreting      [address] [place] [address]                               Physician         Physician Cardiology                                                 Gio Sahu MD                                  Any Other     Procedure     Type of Study      [facility] procedure:Transesophageal Echo ([facility]), Doppler Echocardiography, Color   Flow Velocity Mapping. Procedure Date  Date: 08/19/2020 Start: 01:00 PM     Study Location: Portable  Technical Quality: Adequate visualization     Indications:Atrial fibrillation. Patient Status: Routine     Height: 71 inches Weight: 229 pounds BSA: 2.23 m^2 BMI: 31.94 kg/m^2     HR: 90 bpm BP: 124/82 mmHg     [facility] Performed By: the attending and the sonographer      Type of Anesthesia: Moderate sedation      Findings      Left Ventricle   Left ventricle is moderately enlarged . Ejection fraction is visually estimated at 20-25%. Overall ejection fraction severely decreased . Normal left ventricle wall thickness. Right Ventricle   Normal right ventricular size and function. Left Atrium   Severely dilated left atrium. No evidence of thrombus within left atrium or appendage. No evidence of atrial septal defect. No evidence of patent foramen ovale. KOBY emptying velocity 26 cm/s. Right Atrium   Normal right atrium size. Mitral Valve   Normal mitral valve structure and function. No evidence of mitral valve stenosis. Moderate mitral regurgitation with centrally directed jet. Tricuspid Valve   The tricuspid valve appears structurally normal.   Mild tricuspid regurgitation. RVSP is 27 mmHg. Normal estimated PA systolic pressure. Aortic Valve   Structurally normal aortic valve. The aortic valve is trileaflet with good leaflet separation. No hemodynamically significant aortic stenosis is present. No evidence of aortic valve regurgitation. Pulmonic Valve   Pulmonic valve is structurally normal.   Physiologic and/or trace pulmonic regurgitation present. Pericardial Effusion   No evidence for hemodynamically significant pericardial effusion. Pleural Effusion   No evidence of pleural effusion. Aorta   Normal aortic root and ascending aorta.    Miscellaneous   Inferior Vena Cava not well visualized. Conclusions      Summary   Left ventricle is moderately enlarged . Ejection fraction is visually estimated at 20-25%. Overall ejection fraction severely decreased . Normal left ventricle wall thickness. Severely dilated left atrium. No evidence of thrombus within left atrium or appendage. Normal right ventricular size and function. Moderate mitral regurgitation with centrally directed jet. No hemodynamically significant aortic stenosis is present. Mild tricuspid regurgitation. RVSP is 27 mmHg. Normal estimated PA systolic pressure. No evidence for hemodynamically significant pericardial effusion. Signature      ----------------------------------------------------------------   Electronically signed by Shane Amado MD(Interpreting   physician) on 08/19/2020 07:41 PM                 Anesthesia Plan      general     ASA 4     (D/C'ed Eliquis after Monday's dose  Possible A-Line)  Induction: intravenous. arterial line    Anesthetic plan and risks discussed with patient and spouse. Use of blood products discussed with patient and spouse whom consented to blood products. Plan discussed with attending and CRNA. Venessa Noguera DO   8/5/2021      Patient seen and examined, chart reviewed, agree with above findings. Anesthetic plan, risks, benefits, alternatives, and personnel involved discussed with patient and his wife. Patient verbalized an understanding and agreed to proceed. NPO status confirmed. Clarification regarding cardiology clearance in the chart. Anesthetic plan discussed with care team members and agreed upon.     Venessa Noguera DO   8/5/2021  9:39 AM

## 2021-08-05 NOTE — PROGRESS NOTES
Brief note:    Recent EKG was reviewed and he has atrial paced rhythm with a nonspecific ST-T changes. Patient had a cardiac cath in August 2020 which showed nonobstructive CAD. Currently, patient is asymptomatic without any chest pain and is scheduled to undergo elective hip surgery. No further ischemic work-up is needed prior to hip surgery and should proceed with surgery as scheduled.

## 2021-08-06 LAB
ANION GAP SERPL CALCULATED.3IONS-SCNC: 11 MMOL/L (ref 7–16)
BUN BLDV-MCNC: 21 MG/DL (ref 6–20)
CALCIUM SERPL-MCNC: 8.4 MG/DL (ref 8.6–10.2)
CHLORIDE BLD-SCNC: 92 MMOL/L (ref 98–107)
CO2: 25 MMOL/L (ref 22–29)
CREAT SERPL-MCNC: 1.5 MG/DL (ref 0.7–1.2)
GFR AFRICAN AMERICAN: 58
GFR NON-AFRICAN AMERICAN: 58 ML/MIN/1.73
GLUCOSE BLD-MCNC: 140 MG/DL (ref 74–99)
HCT VFR BLD CALC: 33.8 % (ref 37–54)
HEMOGLOBIN: 11.3 G/DL (ref 12.5–16.5)
MCH RBC QN AUTO: 35 PG (ref 26–35)
MCHC RBC AUTO-ENTMCNC: 33.4 % (ref 32–34.5)
MCV RBC AUTO: 104.6 FL (ref 80–99.9)
PDW BLD-RTO: 12.8 FL (ref 11.5–15)
PLATELET # BLD: 183 E9/L (ref 130–450)
PMV BLD AUTO: 9.7 FL (ref 7–12)
POTASSIUM SERPL-SCNC: 4.3 MMOL/L (ref 3.5–5)
RBC # BLD: 3.23 E12/L (ref 3.8–5.8)
SODIUM BLD-SCNC: 127 MMOL/L (ref 132–146)
SODIUM BLD-SCNC: 128 MMOL/L (ref 132–146)
WBC # BLD: 8.9 E9/L (ref 4.5–11.5)

## 2021-08-06 PROCEDURE — 80048 BASIC METABOLIC PNL TOTAL CA: CPT

## 2021-08-06 PROCEDURE — 36415 COLL VENOUS BLD VENIPUNCTURE: CPT

## 2021-08-06 PROCEDURE — 1200000000 HC SEMI PRIVATE

## 2021-08-06 PROCEDURE — 6360000002 HC RX W HCPCS: Performed by: ORTHOPAEDIC SURGERY

## 2021-08-06 PROCEDURE — 6370000000 HC RX 637 (ALT 250 FOR IP): Performed by: ORTHOPAEDIC SURGERY

## 2021-08-06 PROCEDURE — 97161 PT EVAL LOW COMPLEX 20 MIN: CPT

## 2021-08-06 PROCEDURE — 97530 THERAPEUTIC ACTIVITIES: CPT

## 2021-08-06 PROCEDURE — 99232 SBSQ HOSP IP/OBS MODERATE 35: CPT | Performed by: INTERNAL MEDICINE

## 2021-08-06 PROCEDURE — 84295 ASSAY OF SERUM SODIUM: CPT

## 2021-08-06 PROCEDURE — 2500000003 HC RX 250 WO HCPCS: Performed by: ORTHOPAEDIC SURGERY

## 2021-08-06 PROCEDURE — 97535 SELF CARE MNGMENT TRAINING: CPT

## 2021-08-06 PROCEDURE — 2580000003 HC RX 258: Performed by: ORTHOPAEDIC SURGERY

## 2021-08-06 PROCEDURE — 85027 COMPLETE CBC AUTOMATED: CPT

## 2021-08-06 RX ORDER — SENNA AND DOCUSATE SODIUM 50; 8.6 MG/1; MG/1
1 TABLET, FILM COATED ORAL 2 TIMES DAILY
Qty: 60 TABLET | Refills: 0 | Status: SHIPPED | OUTPATIENT
Start: 2021-08-06 | End: 2021-09-05

## 2021-08-06 RX ORDER — OXYCODONE HYDROCHLORIDE 5 MG/1
5 TABLET ORAL EVERY 4 HOURS PRN
Qty: 42 TABLET | Refills: 0 | Status: SHIPPED | OUTPATIENT
Start: 2021-08-06 | End: 2021-08-13

## 2021-08-06 RX ADMIN — APIXABAN 5 MG: 5 TABLET, FILM COATED ORAL at 22:06

## 2021-08-06 RX ADMIN — SPIRONOLACTONE 25 MG: 25 TABLET ORAL at 20:01

## 2021-08-06 RX ADMIN — AMIODARONE HYDROCHLORIDE 200 MG: 200 TABLET ORAL at 08:50

## 2021-08-06 RX ADMIN — HYDRALAZINE HYDROCHLORIDE 25 MG: 25 TABLET, FILM COATED ORAL at 22:06

## 2021-08-06 RX ADMIN — APIXABAN 5 MG: 5 TABLET, FILM COATED ORAL at 08:49

## 2021-08-06 RX ADMIN — OXYCODONE 10 MG: 5 TABLET ORAL at 20:01

## 2021-08-06 RX ADMIN — ACETAMINOPHEN 650 MG: 325 TABLET ORAL at 14:21

## 2021-08-06 RX ADMIN — ISOSORBIDE DINITRATE 20 MG: 10 TABLET ORAL at 08:49

## 2021-08-06 RX ADMIN — ACETAMINOPHEN 650 MG: 325 TABLET ORAL at 06:42

## 2021-08-06 RX ADMIN — DOCUSATE SODIUM 50 MG AND SENNOSIDES 8.6 MG 1 TABLET: 8.6; 5 TABLET, FILM COATED ORAL at 08:50

## 2021-08-06 RX ADMIN — DOCUSATE SODIUM 50 MG AND SENNOSIDES 8.6 MG 1 TABLET: 8.6; 5 TABLET, FILM COATED ORAL at 22:06

## 2021-08-06 RX ADMIN — SODIUM CHLORIDE, PRESERVATIVE FREE 10 ML: 5 INJECTION INTRAVENOUS at 22:07

## 2021-08-06 RX ADMIN — FAMOTIDINE 20 MG: 10 INJECTION, SOLUTION INTRAVENOUS at 22:07

## 2021-08-06 RX ADMIN — ACETAMINOPHEN 650 MG: 325 TABLET ORAL at 20:00

## 2021-08-06 RX ADMIN — SODIUM CHLORIDE, PRESERVATIVE FREE 10 ML: 5 INJECTION INTRAVENOUS at 08:58

## 2021-08-06 RX ADMIN — Medication 2000 MG: at 01:54

## 2021-08-06 RX ADMIN — FAMOTIDINE 20 MG: 10 INJECTION, SOLUTION INTRAVENOUS at 08:50

## 2021-08-06 RX ADMIN — METOPROLOL SUCCINATE 25 MG: 25 TABLET, EXTENDED RELEASE ORAL at 08:50

## 2021-08-06 RX ADMIN — HYDRALAZINE HYDROCHLORIDE 25 MG: 25 TABLET, FILM COATED ORAL at 06:42

## 2021-08-06 RX ADMIN — OXYCODONE 10 MG: 5 TABLET ORAL at 08:44

## 2021-08-06 ASSESSMENT — PAIN DESCRIPTION - PAIN TYPE
TYPE: SURGICAL PAIN
TYPE: SURGICAL PAIN

## 2021-08-06 ASSESSMENT — PAIN DESCRIPTION - LOCATION
LOCATION: HIP
LOCATION: HIP

## 2021-08-06 ASSESSMENT — PAIN SCALES - GENERAL
PAINLEVEL_OUTOF10: 7
PAINLEVEL_OUTOF10: 4
PAINLEVEL_OUTOF10: 7
PAINLEVEL_OUTOF10: 6
PAINLEVEL_OUTOF10: 0
PAINLEVEL_OUTOF10: 7
PAINLEVEL_OUTOF10: 7

## 2021-08-06 ASSESSMENT — PAIN DESCRIPTION - ONSET
ONSET: ON-GOING
ONSET: ON-GOING

## 2021-08-06 ASSESSMENT — PAIN - FUNCTIONAL ASSESSMENT
PAIN_FUNCTIONAL_ASSESSMENT: PREVENTS OR INTERFERES SOME ACTIVE ACTIVITIES AND ADLS
PAIN_FUNCTIONAL_ASSESSMENT: PREVENTS OR INTERFERES SOME ACTIVE ACTIVITIES AND ADLS

## 2021-08-06 ASSESSMENT — PAIN DESCRIPTION - PROGRESSION
CLINICAL_PROGRESSION: GRADUALLY WORSENING
CLINICAL_PROGRESSION: GRADUALLY WORSENING

## 2021-08-06 ASSESSMENT — PAIN DESCRIPTION - DESCRIPTORS
DESCRIPTORS: DISCOMFORT
DESCRIPTORS: ACHING;DISCOMFORT

## 2021-08-06 ASSESSMENT — PAIN DESCRIPTION - FREQUENCY
FREQUENCY: CONTINUOUS
FREQUENCY: CONTINUOUS

## 2021-08-06 ASSESSMENT — PAIN DESCRIPTION - ORIENTATION
ORIENTATION: RIGHT
ORIENTATION: RIGHT

## 2021-08-06 NOTE — PROGRESS NOTES
activities to facilitate/challenge dynamic balance, stand tolerance for increased safety and independence with ADLs  * Neuro-muscular re-education: facilitation of righting/equilibrium reactions, midline orientation, scapular stability/mobility, normalization of muscle tone, and facilitation of volitional active controled movement     Recommended Adaptive Equipment: TBD     Home Living: Patient lives alone in a two-floor home; patient's bedroom and full bathroom are on the second floor. Bathroom Setup: half bathroom on the main living level (with elevated toilet); tub shower and elevated toilet on second floor  Equipment Owned: walker, BSC, reacher, sock aid, long-handled shoe horn     Prior Level of Function (PLOF): Patient was independent with ADLs, IADLs, and functional mobility prior to this hospitalization. Patient noted that he will have consistent assistance from family members, as needed, upon discharge.       Pain Level: Pt did not rate pain during this session. Cognition: Patient alert and oriented. Cues for safety awareness.      Functional Assessment:                  AM-PAC Daily Activity Raw Score: 19/24    Initial Eval Status  Date: 8/5/2021 Treatment Status  Date: 8/6/21 Short Term Goals = Long Term Goals   Feeding Independent       Grooming CGA Independent   Mod I  (seated/standing at sink)   UB Dressing SBA  independent  Mod I  (including item retrieval)   LB Dressing Mod A  pt initially attempting to don clothing without use of adaptive equipment. Hip precaution education and adaptive equipment instruction provided. Pt used equipment for LB dressing however does have a tendency to automatically bend past 90 degrees during ADL activity. Pt did report missing AE from previous surgery. Accepted new hip kit this session. Mod I (excluding DEANGELO hose)- while demonstrating Good adherence to posterolateral hip precautions and Good understanding of AE use.    Bathing Mod A  SBA bathing with use of plan of care    Time In: 8:20/ 9:20  Time Out: 8:30/9:50     Min Units   Therapeutic Ex 54060     Therapeutic Activities 61138 03 7   ADL/Self Care 22742 30 2   Orthotic Management 62853     Neuro Re-Ed 77089     Non-Billable Time     TOTAL TIMED TREATMENT 40 1239 [facility] GURMEET/L 29940

## 2021-08-06 NOTE — PROGRESS NOTES
Physical Therapy    Facility/Department: [facility] SURGERY  Initial Assessment    NAME: Melina Casillas  : 1960  MRN: [id number]    Date of Service: 2021      Patient Diagnosis(es): The encounter diagnosis was Primary osteoarthritis of right hip.     has a past medical history of Arthritis, CAD (coronary artery disease), CHF (congestive heart failure) ([address] Hypertension, New onset atrial flutter ([address] Nonischemic cardiomyopathy ([address] Seasonal allergies, and VF (ventricular fibrillation) ([facility] [address] has a past surgical history that includes Dilatation, esophagus; hip surgery (2016); Cardiac defibrillator placement (2020); pacemaker placement (); back surgery (); hernia repair (Right, ); joint replacement (Left, ); Wrist surgery (Right); and Total hip arthroplasty (Right, 2021). Evaluating Therapist: Aaron Muñoz PT        Room #:  6295/3316-A  Diagnosis:  Primary osteoarthritis of right hip [M16.11]  PMHx/PSHx:  CAD, CHF, L THOM  Procedure/Surgery:  R THOM  Precautions:  WBAT, posterolateral hip precautions, falls        Social:  Pt lives alone in a 2 floor plan no steps to enter. Steps to second floor have 1 rail but pt states he holds wall on other side. States he will have family with him when first going home  Prior to admission independent without device       Initial Evaluation  Date: 21 Treatment     21 PM Short Term/ Long Term   Goals   Was pt agreeable to Eval/treatment? yes  yes     Does pt have pain?  R hip R hip      Bed Mobility  Rolling: NT  Supine to sit: supervision  Sit to supine: NT  Scooting: supervison  NT independnet   Transfers Sit to stand: supervison  Stand to sit: supervision  Stand pivot: supervison  sit <> stand supervison independnet   Ambulation    125 feet and 150 with ww with supervison  150 feet x2 with ww supervison 200 feet with ww independent   Stair Negotiation  Ascended and descended 8 with 2 rails supervison  4 with 2 rails supervision  12 steps with 1 rail and wall independent   LE strength     R LE 3/5 L LE 4/5         balance      Good with ww       AM-PAC Raw score               18/24 18/24       Patient education  Pt was educated on walker safety    Patient response to education:   Pt verbalized understanding Pt demonstrated skill Pt requires further education in this area   yes With cues yes     Additional Comments: Pt quick moving, impulsive at times. Pt reports increased hip pain/stiffness this pm but felt better after ambulation    Pt was left in chair with call light left by patient. Time in: 110  Time out: 126    Total treatment time 16 minutes    Pt is making good progress toward established Physical Therapy goals. Continue with physical therapy current plan of care.     [facility] PT [id number]      CPT codes:  [] Low Complexity PT evaluation 20921  [] Moderate Complexity PT evaluation 57726  [] High Complexity PT evaluation 14050  [] PT Re-evaluation 97759  [] Gait training 54628  minutes  [] Manual therapy 91260    minutes  [x] Therapeutic activities 73484   16 minutes  [] Therapeutic exercises 09717     minutes  [] Neuromuscular reeducation 35289     minutes

## 2021-08-06 NOTE — PROGRESS NOTES
Physical Therapy    Facility/Department: [facility] SURGERY  Initial Assessment    NAME: Brando Fisher  : 1960  MRN: [id number]    Date of Service: 2021      Patient Diagnosis(es): The encounter diagnosis was Primary osteoarthritis of right hip.     has a past medical history of Arthritis, CAD (coronary artery disease), CHF (congestive heart failure) ([address] Hypertension, New onset atrial flutter ([address] Nonischemic cardiomyopathy ([address] Seasonal allergies, and VF (ventricular fibrillation) ([facility] [address] has a past surgical history that includes Dilatation, esophagus; hip surgery (2016); Cardiac defibrillator placement (2020); pacemaker placement (); back surgery (); hernia repair (Right, ); joint replacement (Left, ); Wrist surgery (Right); and Total hip arthroplasty (Right, 2021). Evaluating Therapist: Barrett Carballo PT      Room #:  1461/7537-Y  Diagnosis:  Primary osteoarthritis of right hip [M16.11]  PMHx/PSHx:  CAD, CHF, L THOM  Procedure/Surgery:  R THOM  Precautions:  WBAT, posterolateral hip precautions, falls      Social:  Pt lives alone in a 2 floor plan no steps to enter. Steps to second floor have 1 rail but pt states he holds wall on other side. States he will have family with him when first going home  Prior to admission independent without device     Initial Evaluation  Date: 21 Treatment      Short Term/ Long Term   Goals   Was pt agreeable to Eval/treatment? yes     Does pt have pain?  R hip     Bed Mobility  Rolling: NT  Supine to sit: supervision  Sit to supine: NT  Scooting: supervison  independnet   Transfers Sit to stand: supervison  Stand to sit: supervision  Stand pivot: supervison  independnet   Ambulation    125 feet and 150 with ww with supervison  200 feet with ww independent   Stair Negotiation  Ascended and descended 8 with 2 rails supervison   12 steps with 1 rail and wall independent   LE strength     R LE 3/5 L LE 4/5        balance Good with ww     AM-PAC Raw score               18/24         Pt is alert and Oriented   LE ROM: WFL of precautions  Sensation: intact  Endurance: good       ASSESSMENT:    Pt displays functional ability as noted in the objective portion of this evaluation. Patient education  Pt educated on hip precautions    Patient response to education:   Pt verbalized understanding Pt demonstrated skill Pt requires further education in this area   yes yes reinforcement       Comments:  Pt instructed in transfer technique and walker safety. Occasional cues for safety during session. Pt tends to be quick movging      Pt's/ family goals   1. To go home today    Conditions Requiring Skilled Therapeutic Intervention:    [x]Decreased strength     []Decreased ROM  [x]Decreased functional mobility  []Decreased balance   []Decreased endurance   []Decreased posture  []Decreased sensation  []Decreased coordination   []Decreased vision  []Decreased safety awareness   [x]Increased pain       Patient and or family understand(s) diagnosis, prognosis, and plan of care.   Prognosis is good for reaching above PT goals    PHYSICAL THERAPY PLAN OF CARE:    PT POC is established based on physician order and patient diagnosis     Referring provider/PT Order: Supriya Umana MD/ PT eval and treat      Current Treatment Recommendations:     [x] Strengthening to improve independence with functional mobility   [] ROM to improve independence with functional mobility   [x] Balance Training to improve static/dynamic balance and to reduce fall risk  [x] Endurance Training to improve activity tolerance during functional mobility   [x] Transfer Training to improve safety and independence with all functional transfers   [x] Gait Training to improve gait mechanics, endurance and assess need for appropriate assistive device  [x] Stair Training in preparation for safe discharge home and/or into the community   [] Positioning to prevent skin breakdown and contractures  [x] Safety and Education Training   [x] Patient/Caregiver Education   [] HEP  [] Other     PT long term treatment goals are located in above grid    Frequency of treatments:BID x 2 days. Time in  0815  Time out  0833        Evaluation Time includes thorough review of current medical information, gathering information on past medical history/social history and prior level of function, completion of standardized testing/informal observation of tasks, assessment of data and education on plan of care and goals.       CPT codes:  [x] Low Complexity PT evaluation 05771  [] Moderate Complexity PT evaluation 36648  [] High Complexity PT evaluation 71845  [] PT Re-evaluation 67724  [] Gait training 32986 minutes  [] Manual therapy 15773 minutes  [] Therapeutic activities 71145 minutes  [] Therapeutic exercises 01572 minutes  [] Neuromuscular reeducation 66570 minutes     [facility] PT [id number]

## 2021-08-06 NOTE — ANESTHESIA POSTPROCEDURE EVALUATION
Department of Anesthesiology  Postprocedure Note    Patient: Paxton Aragon  MRN: [id number]  YOB: 1960  Date of evaluation: 8/5/2021  Time:  9:43 PM     Procedure Summary     Date: 08/05/21 Room / Location: [facility] OR [address] [place] [address]    Anesthesia Start: 0904 Anesthesia Stop: 5858    Procedure: RIGHT TOTAL HIP ARTHROPLASTY (Right Hip) Diagnosis: (OSTEOARTHRITIS)    Surgeons: Isak Abreu MD Responsible Provider: Zarina Ventura DO    Anesthesia Type: general ASA Status: 4          Anesthesia Type: general    Garry Phase I: Garry Score: 9    Garry Phase II:      Last vitals: Reviewed and per EMR flowsheets.        Anesthesia Post Evaluation    Patient location during evaluation: PACU  Patient participation: complete - patient participated  Level of consciousness: awake and alert  Pain score: 1  Airway patency: patent  Nausea & Vomiting: no nausea and no vomiting  Complications: no  Cardiovascular status: hemodynamically stable  Respiratory status: acceptable  Hydration status: euvolemic

## 2021-08-06 NOTE — CONSULTS
[facility]   IM consult    Chief Complaint:  Post op hip,  Pain    History of Present Illness   The patient is a 64 y.o. male      progressive right hip pain and stiffness secondary to bone-on-bone  osteoarthritis lack of sustained esponsive to conservative treatment. - bc of  Moderate- severe pain with wb activity- elected to be admitted  Now s/p THOM R    POSTOPERATIVE DIAGNOSIS:  Severe right hip osteoarthritis. PROCEDURE PERFORMED:  Right total hip arthroplasty. ANESTHESIA:  General.   ESTIMATED BLOOD LOSS:  300. Noted he has had hx of post op anemia in past with other side THOM- hgb currently stable, expected to drop 2-3points sp THOM over next couple days. Expected post op pain,    Screen for ?urinary retention, watch for delirium ,    Inc bmi/31.4  -- Watch for TRIPP exac sp generalized anasthesia- cpap prn    Post op MACE events etc.. Hx Nonischemic cardiomyopathy  Nonobstructive coronary artery disease  LV ejection fraction of 20 to 25%  Atrial flutter on Eliquis  Moderate to severe mitral regurgitation and moderate tricuspid regurgitation    Diastolic NON ischemiac (NON obstructive coronaries) diastolic heart with pacemaker backup- on metoprolol and amidarone for hx of aflutter and ventricular tachycardia    - also on eliquis - held pre operatively    At risk for arrhythmia with stressful situations like THOM +/- takasubo heart  Per cardiology on 8/5-- dr. Agata Barraza:    Va Tobias paced rhythm with a nonspecific ST-T changes. Patient had a cardiac cath in August 2020 which showed nonobstructive \"    Cr 1.4- not ANTHONY- this is CKD- cr. 1.4 - 1.7 back in 2020   Will hold lasix post op, till good PO intake  Check post void residual, once catheter removed    AST >alt- reports some etoh use (claims not every day)- watch for DTs.  +macrocytosis mcv 107  No DTs reported on admission 8/25/2020    Wbc 3.1- recheck cbc in am- this looks to be fairly chronic WBC 3.2 to 4.3 over past year.     - hx taken from the patient and med records  REVIEW OF SYSTEMS:  no fevers, chills, cp, sob, n/v, ha, vision/hearing changes, wt changes, hot/cold flashes, other open skin lesions, diarrhea, constipation, dysuria/hematuria unless noted in HPI. Complete ROS performed with the patient and is otherwise negative. Past Medical History:      Diagnosis Date    Arthritis     right hip    CAD (coronary artery disease)     CHF (congestive heart failure) ([facility] [address] 2020    Hypertension     New onset atrial flutter ([facility] [address] 2020    Nonischemic cardiomyopathy ([facility] [address] 08/2020    Seasonal allergies     VF (ventricular fibrillation) ([facility] [address] 08/2020       Past Surgical History:        Procedure Laterality Date    BACK SURGERY  2012    lumbar area L4 L5    CARDIAC DEFIBRILLATOR PLACEMENT  08/27/2020    Dual ICD    (medtronic)    Dr. Susa Mortimer, ESOPHAGUS      HERNIA REPAIR Right 1990's    inguinal    HIP SURGERY  09/13/2016    left total; hip arthroplasty    JOINT REPLACEMENT Left 2013    hip replacement    PACEMAKER PLACEMENT  2020    AICD    TOTAL HIP ARTHROPLASTY Right 8/5/2021    RIGHT TOTAL HIP ARTHROPLASTY performed by Rose Bridges MD at [address] [place] [address] Right        Home Medications:  Prior to Admission medications    Medication Sig Start Date End Date Taking?  Authorizing Provider   hydrALAZINE (APRESOLINE) 25 MG tablet Take 1 tablet by mouth every 8 hours 7/16/21  Yes Nevaeh Leger MD   metoprolol succinate (TOPROL XL) 25 MG extended release tablet TAKE 3 TABLETS BY MOUTH EVERY NIGHT 4/24/21  Yes Historical Provider, MD   furosemide (LASIX) 40 MG tablet Take 1 tablet by mouth daily 5/28/21  Yes Nevaeh Leger MD   spironolactone (ALDACTONE) 25 MG tablet Take 1 tablet by mouth daily 5/28/21  Yes Nevaeh Leger MD   isosorbide dinitrate (ISORDIL) 20 MG tablet TAKE 1 TABLET BY MOUTH THREE TIMES A DAY 5/17/21  Yes Nevaeh eLger MD   ELIQUIS 5 MG TABS tablet TAKE 1 TABLET BY MOUTH TWICE A DAY 5/17/21  Yes Tiburcio Neal MD   amiodarone (CORDARONE) 200 MG tablet Take 1 tablet by mouth daily 12/7/20  Yes Tiburcio Neal MD       Allergies:  Ace inhibitors, Lisinopril, and 5-alpha reductase inhibitors    Social History:   TOBACCO:   reports that he quit smoking about a year ago. His smoking use included cigarettes. He has a 3.75 pack-year smoking history. He has never used smokeless tobacco.  ETOH:   reports current alcohol use of about 3.0 standard drinks of alcohol per week. Family History:       Problem Relation Age of Onset    Diabetes Brother     High Blood Pressure Paternal Aunt     High Blood Pressure Paternal Uncle       Or deferred/otherwise considered non contributory to current admission  PHYSICAL EXAM:    VS: /81   Pulse 60   Temp 97.5 °F (36.4 °C) (Oral)   Resp 16   Ht 6' (1.829 m)   Wt 232 lb (105.2 kg)   SpO2 98%   BMI 31.46 kg/m²     General Appearance:     no acute distress. Psych:  HEENT:    A.O. As per HPI details  NC/AT, PERRL, no pallor no icterus, lips/ext mucous membrane grossly N    Neck:   Supple, trachea midline, no obvious JVD   Resp:     Dec  bs No wheezes, No rhonchi   Chest wall:    No tenderness or deformity   Heart:    Regular rate and rhythm, S1 and S2 normal, no rub or gallop. Abdomen:     Soft, non-tender, bowel sounds active    no suspicious obvious masses/organomegaly   Genitalia & Rectal:    Deferred.    Extremities x4:   Extremities normal, atraumatic, no cyanosis, no clubbing   Musculoskeletal:        R hip dressing/noted   Skin:   Skin color, texture, turgor fairly normal, no ACUTE rashes or lesions in lower legs and arms examined   Lymph nodes:   Cervical nodes grossly normal   Neurologic:  .Grossly symmetric  intact DF/ankle motions b/l  Feet b/l  LEs with symmetric grossly intact to light touch sensation     LABS:  CBC:   Lab Results   Component Value Date    WBC 3.1 08/02/2021    RBC 3.93 08/02/2021    HGB 13.8 08/02/2021    HCT 42.1 08/02/2021    PLT 227 2021    .1 2021     BMP:    Lab Results   Component Value Date     2021    K 4.3 2021    K 4.2 2020     2021    CO2 28 2021    BUN 17 2021    CREATININE 1.4 2021    GLUCOSE 107 2021    CALCIUM 9.7 2021     Hepatic Function Panel:    Lab Results   Component Value Date    ALKPHOS 68 2021    AST 53 2021    ALT 44 2021    PROT 7.6 2021    LABALBU 4.3 2021    BILITOT 0.8 2021     Magnesium:    Lab Results   Component Value Date    MG 2.0 2020       PT/INR:    Lab Results   Component Value Date    PROTIME 15.4 2020    INR 1.4 2020     U/A: No results found for: NITRITE, LEUKOCYTESUR, PHUR, WBCUA, RBCUA, BACTERIA, SPECGRAV, BLOODU, GLUCOSEU  ABG:  No results found for: PHART, PUR0OVE, PO2ART, V3ARDWQC, IDC4LDD, BEART  TSH:    Lab Results   Component Value Date    TSH 4.120 2021     Cardiac Enzymes:   Lab Results   Component Value Date    TROPONINI <0.01 2020    TROPONINI 0.01 2020    TROPONINI <0.01 08/15/2020       Radiology: No results found. EK/10/2021Atrial-paced rhythm with prolonged AV conduction  ST & Nonspecific ST and T wave abnormality  Abnormal ECG  When compared with ECG of 19-AUG-2020 13:44,  Electronic atrial pacemaker has replaced Sinus rhythm   Assessment & Plan   ACTIVE hospital problems being addressed/reassessed for this admission:  Principal Problem:    Primary osteoarthritis of right hip  Active Problems:    Essential hypertension    Atrial flutter with rapid ventricular response (HCC)    CAD in native artery    Nonischemic cardiomyopathy ([facility] [address]  Resolved Problems:    * No resolved hospital problems.  *    Code status/DVT prophylaxis and PLAN --see orders   Note extensive time spent coordinating care between ER docs, ER and floor nurses, and transitioning care over to day providers  Plan of care/ clinical impressions/communication specifics detailed below:    64 y.o. male      progressive right hip pain and stiffness secondary to bone-on-bone  osteoarthritis lack of sustained esponsive to conservative treatment. - bc of  Moderate- severe pain with wb activity- elected to be admitted  Now s/p THOM R    POSTOPERATIVE DIAGNOSIS:  Severe right hip osteoarthritis. PROCEDURE PERFORMED:  Right total hip arthroplasty. ANESTHESIA:  General.   ESTIMATED BLOOD LOSS:  300. Noted he has had hx of post op anemia in past with other side THOM- hgb currently stable, expected to drop 2-3points sp THOM over next couple days. Expected post op pain,    Screen for ?urinary retention, watch for delirium ,    Inc bmi/31.4  -- Watch for TRIPP exac sp generalized anasthesia- cpap prn    Post op MACE events etc.. Hx Nonischemic cardiomyopathy  Nonobstructive coronary artery disease  LV ejection fraction of 20 to 25%  Atrial flutter on Eliquis  Moderate to severe mitral regurgitation and moderate tricuspid regurgitation    Diastolic NON ischemiac (NON obstructive coronaries) diastolic heart with pacemaker backup- on metoprolol and amidarone for hx of aflutter and ventricular tachycardia    - also on eliquis - held pre operatively    At risk for arrhythmia with stressful situations like THOM +/- takasubo heart  Per cardiology on 8/5-- dr. Abiel Wade:    Darletta Clements paced rhythm with a nonspecific ST-T changes. Patient had a cardiac cath in August 2020 which showed nonobstructive \"    Cr 1.4- not ANTHONY- this is CKD- cr. 1.4 - 1.7 back in 2020   Will hold lasix post op, till good PO intake    He feels like needs to urinate-  Check post void residual- currently getting IV fluids  Hold next dose of home lasix, dec PO intake today    AST >alt- reports some etoh use (claims not every day)- watch for DTs.  +macrocytosis mcv 107  No DTs reported on admission 8/25/2020    Wbc 3.1- recheck cbc in am- this looks to be fairly chronic WBC 3.2 to 4.3 over past year.     Baljeet Cortés MD Night Officer, overnight admitting doctor at [facility] call day time doctor   for questions after 7:30am    Covering for [address] [facility]  If Qs please call [phone]  Electronically signed by Coy Reynoso MD on 8/5/2021 at 9:47 PM

## 2021-08-06 NOTE — PROGRESS NOTES
CLINICAL PHARMACY NOTE: MEDS TO BEDS    Total # of Prescriptions Filled: 1   The following medications were delivered to the patient:  · Oxycodone 5 mg       Additional Documentation:

## 2021-08-06 NOTE — PLAN OF CARE
Problem: Pain:  Goal: Pain level will decrease  Description: Pain level will decrease  8/6/2021 1054 by Tony Pa RN  Outcome: Met This Shift  8/6/2021 0142 by Briana Kyle RN  Outcome: Met This Shift  Goal: Control of acute pain  Description: Control of acute pain  Outcome: Met This Shift  Goal: Control of chronic pain  Description: Control of chronic pain  Outcome: Met This Shift     Problem: Falls - Risk of:  Goal: Will remain free from falls  Description: Will remain free from falls  8/6/2021 1054 by Tony Pa RN  Outcome: Met This Shift  8/6/2021 0142 by Briana Kyle RN  Outcome: Met This Shift  Goal: Absence of physical injury  Description: Absence of physical injury  Outcome: Met This Shift

## 2021-08-06 NOTE — PROGRESS NOTES
Progress Note    Post op Day 1      S:   Complaints: No issues overnight. Alert and oriented. Resting comfortably in bed today. Tolerating diet. O:     Vitals:    08/06/21 0642   BP: (!) 144/88   Pulse:    Resp:    Temp:    SpO2:         Dressing clean/dry/intact   Femoral/Sciatic intact   Calf - soft     H/H:   Recent Labs     08/06/21  0515   HGB 11.3*   HCT 33.8*     Creatinine 1.5   PT/INR: No results for input(s): PROT, INR in the last 72 hours. A/P:   Status post right total hip arthroplasty doing well. Initiate PT/OT today. Continue with anticoagulation as prescribed. Discharge planning for home health care this afternoon with [facility]. Follow-up Dr. Raúl Sánchez in 2 weeks.         Electronically signed by JERE Simms on 8/6/2021 at 7:27 AM

## 2021-08-06 NOTE — OP NOTE
[address] [address]                                OPERATIVE REPORT    PATIENT NAME: Vern Kohler                       :        1960  MED REC NO:   [id number]                            ROOM:  ACCOUNT NO:   [de-identified]                           ADMIT DATE: 2021  PROVIDER:     Lorena Villarreal MD    DATE OF PROCEDURE:  2021    PREOPERATIVE DIAGNOSIS:  Severe right hip osteoarthritis. POSTOPERATIVE DIAGNOSIS:  Severe right hip osteoarthritis. PROCEDURE PERFORMED:  Right total hip arthroplasty. SURGEON:  ANGI Lowe Hunting:  Lidya Garcia PA-C. No qualified surgical resident  available. ANESTHESIA:  General.    ESTIMATED BLOOD LOSS:  300. SPECIMEN:  Bone, right femoral head. COMPLICATIONS:  None. CONDITION:  Stable to recovery room. IMPLANTS:  Biomet G7 58-mm acetabular shell with a 36-mm vitamin E  neutral cross-linked polyethylene liner, size 13 Taperloc femoral stem  with high offset, and a 36-mm Biolox ceramic femoral head, standard. INDICATIONS FOR PROCEDURE:  The patient is a 51-year-old gentleman with  progressive right hip pain and stiffness secondary to bone-on-bone  osteoarthritis unresponsive to conservative treatment. The risks,  benefits, alternatives, and limitations to elective hip arthroplasty  were discussed and informed consent obtained. DESCRIPTION OF PROCEDURE:  The patient met in the preoperative holding  area. The right hip was marked as the correct operative site. He was  taken to the operating room, where general anesthesia was administered. Intravenous antibiotics were given as prophylaxis. He was positioned on  his left side, secured on a pegboard. An axillary roll was placed. All  bony prominences were well padded. The right lower extremity prepped  and draped in the usual sterile fashion.   Following a surgical time-out,  I accessed the right hip through a posterolateral incision. Skin was  incised sharply through the adipose layer down to the level of the  fascia. Fascia was split in line with the incision. Charnley retractor  was placed. The piriformis and posterior capsule were released with  Bovie electrocautery and tagged with #1 Ethibond. The hip was gently  dislocated posteriorly. I reproduced the femoral neck osteotomy above  the lesser trochanter. Inspection of the femoral head revealed advanced  full-thickness cartilage loss with collar osteophytes. I then mobilized  the femur anteriorly, placed our acetabular retractors. There was  extensive synovitis of the hip and I performed a partial capsulectomy as  well as synovectomy to improve the acetabular exposure. There was a  moderate amount of medial osteophyte. I then sequentially reamed the  socket into a hemisphere up to a size 57 mm. Size 58 G7 shell was  impacted in about 40 to 45 degrees of vertical opening and 20 degrees of  anteversion. There was full seating of the cup with a firm press fit. The dome plug was placed followed by the 36 neutral liner with an intact  locking mechanism. I then removed a large inferior acetabular  osteophyte with an osteotome and rongeur. Attention was then turned to  the femur. I gained access to the canal with an awl. I used a tapered  reamer and bur for appropriate lateralization. I broached the hip in  native anteversion up to a size 13, at which time we had a good  rotational fit proximally. Trial reduction with the high offset neck  segment produced a stable hip in all planes with no evidence of bony or  component impingement. Leg-lengths were restored. Trial components  were then removed. The definitive femoral stem was seated. Final trial  reduction confirmed the 36 standard head. This was impacted over a  clean and dry trunnion. The hip was reduced with stability confirmed in  all planes.   I then lavaged the hip with dilute 1 liter of Betadine  solution followed by saline. One gram of vancomycin powder placed in  the joint followed by closure of the capsule with #1 Ethibond. A  pericapsular injection was given for postoperative pain relief. The  fascial and adipose layers were closed with a running #1 Stratafix. The  skin was then closed in layers followed by application of a sterile  dressing and hip abduction pillow. The patient tolerated the procedure  well without intraoperative complications. At the conclusion of the  case, all sponge and needle counts were correct. He was transferred  from the operating room table onto his hospital bed _____ awakened,  extubated, and transported to the recovery room in stable condition. Of  note, physician's assistant was present throughout the entirety of the  case. His presence was necessary to aid with patient positioning,  draping, limb positioning, wound closure, application of surgical  dressing, and patient transport from the operating room table. No  qualified surgical resident available.         Odette Gordon MD    D: 08/05/2021 14:11:18       T: 08/05/2021 14:14:17     [email or web address]  Job#: [id number]     Doc#: [id number]    CC:

## 2021-08-06 NOTE — PROGRESS NOTES
[facility]   Progress Note    Admitting Date and Time: 8/5/2021  8:26 AM  Admit Dx: Primary osteoarthritis of right hip [M16.11]    Subjective:    Patient was admitted with Primary osteoarthritis of right hip [M16.11]. Doing very well POD #1. Unfortunately sodium dropping with IVF and lasix. Also patient says he drank 6 large pitchers of water yesterday when he felt that he couldn't urinate. No shortness of breath, no chest pain. Did well with therapy. ROS: denies fever, chills, cp, sob, n/v, HA unless stated above.      hydrALAZINE  25 mg Oral 3 times per day    isosorbide dinitrate  20 mg Oral Daily    metoprolol succinate  25 mg Oral Daily    spironolactone  25 mg Oral Daily    amiodarone  200 mg Oral Daily    apixaban  5 mg Oral BID    sodium chloride flush  5-40 mL Intravenous 2 times per day    acetaminophen  650 mg Oral Q6H    sennosides-docusate sodium  1 tablet Oral BID    famotidine (PEPCID) injection  20 mg Intravenous BID     perflutren lipid microspheres, 1.5 mL, ONCE PRN  sodium chloride flush, 5-40 mL, PRN  sodium chloride, 25 mL, PRN  ondansetron, 4 mg, Q8H PRN   Or  ondansetron, 4 mg, Q6H PRN  magnesium hydroxide, 30 mL, Daily PRN  oxyCODONE, 5 mg, Q4H PRN   Or  oxyCODONE, 10 mg, Q4H PRN  morphine, 2 mg, Q4H PRN         Objective:    /73   Pulse 67   Temp 97.6 °F (36.4 °C) (Oral)   Resp 18   Ht 6' (1.829 m)   Wt 232 lb (105.2 kg)   SpO2 96%   BMI 31.46 kg/m²   General Appearance: alert and oriented to person, place and time, well developed and well- nourished, in no acute distress  Skin: warm and dry, no rash or erythema  Head: normocephalic and atraumatic  Neck: supple and non-tender without mass, no thyromegaly or thyroid nodules, no cervical lymphadenopathy  Pulmonary/Chest: clear to auscultation bilaterally- no wheezes, rales or rhonchi, normal air movement, no respiratory distress  Cardiovascular: normal rate, regular rhythm, normal S1 and S2, no murmurs  Abdomen: soft, non-tender, non-distended  Extremities: mild edema bilateal      Recent Labs     08/06/21  0515 08/06/21  1050   * 127*   K 4.3  --    CL 92*  --    CO2 25  --    BUN 21*  --    CREATININE 1.5*  --    GLUCOSE 140*  --    CALCIUM 8.4*  --        Recent Labs     08/06/21  0515   WBC 8.9   RBC 3.23*   HGB 11.3*   HCT 33.8*   .6*   MCH 35.0   MCHC 33.4   RDW 12.8      MPV 9.7       Radiology:   XR HIP RIGHT (1 VIEW)   Final Result   Status post total right hip arthroplasty with normal alignment. Assessment:    Principal Problem:    Primary osteoarthritis of right hip  Active Problems:    Essential hypertension    Atrial flutter with rapid ventricular response (HCC)    CAD in native artery    Nonischemic cardiomyopathy ([address]  Resolved Problems:    * No resolved hospital problems. *      Plan:    1.  OA right hip for elective THOM  Doing well postoperatively  Was to be discharged home but sodium dropping. On eliquis for history of CHF and a fib, no other DVT prophylaxis. 2.  Hyponatremia  Likely due to excess water consumption with unopposed IVF and lasix. Trended downward today when rechecked so need to make sure his sodium goes back up again. Fluid restriction, hold lasix at this time. Trend sodium tomorrow, if going up he can be discharged. 3.  History of cardiomyopthy and chronic CHF  No signs of heart failure now. Continue home meds - isordil, toprol XL, spironolactone. 4.  History of a fib  Continue amiodarone and eliquis. Patient medically stable except for hyponatremia. Follow sodium.      Electronically signed by Velia Velez MD on 8/6/2021 at 2:24 PM

## 2021-08-07 VITALS
BODY MASS INDEX: 31.42 KG/M2 | HEART RATE: 78 BPM | OXYGEN SATURATION: 96 % | RESPIRATION RATE: 16 BRPM | SYSTOLIC BLOOD PRESSURE: 127 MMHG | TEMPERATURE: 97.5 F | WEIGHT: 232 LBS | DIASTOLIC BLOOD PRESSURE: 89 MMHG | HEIGHT: 72 IN

## 2021-08-07 LAB
ANION GAP SERPL CALCULATED.3IONS-SCNC: 6 MMOL/L (ref 7–16)
BUN BLDV-MCNC: 21 MG/DL (ref 6–20)
CALCIUM SERPL-MCNC: 8.7 MG/DL (ref 8.6–10.2)
CHLORIDE BLD-SCNC: 96 MMOL/L (ref 98–107)
CO2: 28 MMOL/L (ref 22–29)
CREAT SERPL-MCNC: 1.4 MG/DL (ref 0.7–1.2)
GFR AFRICAN AMERICAN: >60
GFR NON-AFRICAN AMERICAN: >60 ML/MIN/1.73
GLUCOSE BLD-MCNC: 120 MG/DL (ref 74–99)
HCT VFR BLD CALC: 32.6 % (ref 37–54)
HEMOGLOBIN: 11 G/DL (ref 12.5–16.5)
MCH RBC QN AUTO: 35.5 PG (ref 26–35)
MCHC RBC AUTO-ENTMCNC: 33.7 % (ref 32–34.5)
MCV RBC AUTO: 105.2 FL (ref 80–99.9)
PDW BLD-RTO: 12.9 FL (ref 11.5–15)
PLATELET # BLD: 176 E9/L (ref 130–450)
PMV BLD AUTO: 9.9 FL (ref 7–12)
POTASSIUM REFLEX MAGNESIUM: 3.7 MMOL/L (ref 3.5–5)
POTASSIUM SERPL-SCNC: 3.7 MMOL/L (ref 3.5–5)
RBC # BLD: 3.1 E12/L (ref 3.8–5.8)
SODIUM BLD-SCNC: 130 MMOL/L (ref 132–146)
WBC # BLD: 5.4 E9/L (ref 4.5–11.5)

## 2021-08-07 PROCEDURE — 80048 BASIC METABOLIC PNL TOTAL CA: CPT

## 2021-08-07 PROCEDURE — 99232 SBSQ HOSP IP/OBS MODERATE 35: CPT | Performed by: INTERNAL MEDICINE

## 2021-08-07 PROCEDURE — 6370000000 HC RX 637 (ALT 250 FOR IP): Performed by: ORTHOPAEDIC SURGERY

## 2021-08-07 PROCEDURE — 85027 COMPLETE CBC AUTOMATED: CPT

## 2021-08-07 PROCEDURE — 97530 THERAPEUTIC ACTIVITIES: CPT

## 2021-08-07 PROCEDURE — 97116 GAIT TRAINING THERAPY: CPT

## 2021-08-07 PROCEDURE — 97110 THERAPEUTIC EXERCISES: CPT

## 2021-08-07 PROCEDURE — 97535 SELF CARE MNGMENT TRAINING: CPT

## 2021-08-07 PROCEDURE — 36415 COLL VENOUS BLD VENIPUNCTURE: CPT

## 2021-08-07 RX ADMIN — ISOSORBIDE DINITRATE 20 MG: 10 TABLET ORAL at 08:46

## 2021-08-07 RX ADMIN — OXYCODONE 10 MG: 5 TABLET ORAL at 05:01

## 2021-08-07 RX ADMIN — OXYCODONE 10 MG: 5 TABLET ORAL at 09:00

## 2021-08-07 RX ADMIN — ACETAMINOPHEN 650 MG: 325 TABLET ORAL at 06:40

## 2021-08-07 RX ADMIN — AMIODARONE HYDROCHLORIDE 200 MG: 200 TABLET ORAL at 08:45

## 2021-08-07 RX ADMIN — DOCUSATE SODIUM 50 MG AND SENNOSIDES 8.6 MG 1 TABLET: 8.6; 5 TABLET, FILM COATED ORAL at 08:45

## 2021-08-07 RX ADMIN — APIXABAN 5 MG: 5 TABLET, FILM COATED ORAL at 08:45

## 2021-08-07 RX ADMIN — METOPROLOL SUCCINATE 25 MG: 25 TABLET, EXTENDED RELEASE ORAL at 08:45

## 2021-08-07 RX ADMIN — HYDRALAZINE HYDROCHLORIDE 25 MG: 25 TABLET, FILM COATED ORAL at 06:40

## 2021-08-07 RX ADMIN — SPIRONOLACTONE 25 MG: 25 TABLET ORAL at 08:45

## 2021-08-07 ASSESSMENT — PAIN DESCRIPTION - ONSET
ONSET: ON-GOING
ONSET: ON-GOING

## 2021-08-07 ASSESSMENT — PAIN DESCRIPTION - LOCATION
LOCATION: HIP

## 2021-08-07 ASSESSMENT — PAIN SCALES - GENERAL
PAINLEVEL_OUTOF10: 6
PAINLEVEL_OUTOF10: 4
PAINLEVEL_OUTOF10: 7
PAINLEVEL_OUTOF10: 7
PAINLEVEL_OUTOF10: 5
PAINLEVEL_OUTOF10: 6

## 2021-08-07 ASSESSMENT — PAIN DESCRIPTION - FREQUENCY
FREQUENCY: CONTINUOUS
FREQUENCY: CONTINUOUS

## 2021-08-07 ASSESSMENT — PAIN DESCRIPTION - ORIENTATION
ORIENTATION: RIGHT

## 2021-08-07 ASSESSMENT — PAIN DESCRIPTION - PAIN TYPE
TYPE: SURGICAL PAIN

## 2021-08-07 ASSESSMENT — PAIN DESCRIPTION - PROGRESSION
CLINICAL_PROGRESSION: GRADUALLY WORSENING
CLINICAL_PROGRESSION: GRADUALLY WORSENING

## 2021-08-07 ASSESSMENT — PAIN DESCRIPTION - DESCRIPTORS
DESCRIPTORS: ACHING;DISCOMFORT

## 2021-08-07 NOTE — PLAN OF CARE
Problem: Pain:  Goal: Pain level will decrease  Description: Pain level will decrease  8/7/2021 1125 by Pablito Linton RN  Outcome: Completed     Problem: Pain:  Goal: Control of acute pain  Description: Control of acute pain  Outcome: Completed     Problem: Pain:  Goal: Control of chronic pain  Description: Control of chronic pain  Outcome: Completed     Problem: Falls - Risk of:  Goal: Will remain free from falls  Description: Will remain free from falls  8/7/2021 1125 by Pablito Linton RN  Outcome: Completed     Problem: Falls - Risk of:  Goal: Absence of physical injury  Description: Absence of physical injury  Outcome: Completed     Problem: Discharge Planning:  Goal: Knowledge of discharge instructions  Description: Knowledge of discharge instructions  8/7/2021 1125 by Pablito Linton RN  Outcome: Completed     Problem: Mobility - Impaired:  Goal: Achieve maximum mobility level  Description: Achieve maximum mobility level  8/7/2021 1125 by Pablito Linton RN  Outcome: Completed     Problem: Pain - Acute:  Goal: Pain level will decrease  Description: Pain level will decrease  8/7/2021 1125 by Pablito Linton RN  Outcome: Completed

## 2021-08-07 NOTE — PROGRESS NOTES
Occupational Therapy  OT BEDSIDE TREATMENT NOTE      Date:2021  Patient Name: Giovanni White  MRN: [id number]  : 1960  Room: [address] [place], [place] [address]     Evaluating OT: Lidya Chavez - OT.[phone]     Referring Provider: Nafisa Matthews MD  Specific Provider Orders/Date: \"OT eval and treat\" - 2021     Diagnosis: Primary osteoarthritis of right hip [M16.11]    Surgery: Patient underwent R THOM on 2021. Pertinent Medical History: CHF, CAD, atrial flutter, h/o L THOM (2016), HTN      Precautions: fall risk, posterolateral hip precautions, FWBAT     Assessment of Current Deficits:    [x]? Functional mobility             [x]?ADLs           [x]? Strength                  [x]? Cognition   [x]? Functional transfers           [x]? IADLs         [x]? Safety Awareness   [x]? Endurance   []? Fine Coordination              [x]? Balance      []? Vision/perception   [x]? Sensation     []? Gross Motor Coordination  []? ROM           []? Delirium                   []? Motor Control      OT PLAN OF CARE   OT POC is based on physician orders, patient diagnosis, and results of clinical assessment.   Frequency/Duration 2-5 days/week for 2 weeks PRN   Specific OT Treatment Interventions to Include:   * Instruction/training on adapted ADL techniques and AE recommendations to increase functional independence within precautions       * Training on energy conservation strategies, correct breathing pattern and techniques to improve independence/tolerance for self-care routine  * Functional transfer/mobility training/DME recommendations for increased independence, safety, and fall prevention  * Patient/Family education to increase follow through with safety techniques and functional independence  * Recommendation of environmental modifications for increased safety with functional transfers/mobility and ADLs  * Therapeutic exercise to improve motor endurance, ROM, and functional strength for ADLs/functional transfers  * Therapeutic (excluding DEANGELO hose)- while demonstrating Good adherence to posterolateral hip precautions and Good understanding of AE use. Toileting Min A Supervision to stand at toilet with verbal cues for walker placement  Mod I   Bed Mobility  Supine-to-Sit: Min A  Sit-to-Supine: Min A  Supine to sit- Independent      Functional Transfers Sit-to-Stand: CGA   from EOB  (elevated surface). Impulsivity demonstrated with initial sit-to-stand. Supervision and cues for safety technique.   Independent   Functional Mobility CGA   (with walker) within patient's room.   supervision using w/w  Mod I with functional mobility (with device, as needed/appropriate) in order to maximize independence with ADLs/IADLs and other functional tasks. Balance Sitting: Good  (at EOB)  Standing: Fair- to [address]  (with walker) Good during ADL activity.   Fair+ dynamic standing balance during completion of ADLs/IADLs and other functional tasks. Activity Tolerance Fair+  good-  Patient will demonstrate Good understanding and consistent implementation of energy conservation techniques and work simplification techniques into ADL/IADL routines. Comments:  Pt pleasant and cooperative. Able to re-state 2 of 3 hip precautions. Reminders needed during functional tasks to adhere to these. Education/treatment:  ADL retraining with facilitation of movement, instruction of hip precautions, instruction of adaptive equipment, and compensatory strategies for self care skills. Therapeutic activity to address balance and endurance for ADL and transfers. Pt education of hip precautions, home safety, walker safety, and transfer safety. · Pt has made  progress towards set goals.    · Continue with current plan of care    Time In: 8:19  Time Out: 8:45     Min Units   Therapeutic Ex 78306     Therapeutic Activities 77260 10 1   ADL/Self Care 68129 16 1   Orthotic Management 67437     Neuro Re-Ed 85941     Non-Billable Time     TOTAL TIMED TREATMENT 26 2 Kang Plan, OTR/L 4447

## 2021-08-07 NOTE — PROGRESS NOTES
step through pattern without drifting, lateral veering or LOB. Slower paced mobility but indep with no issues. Highly engaged with all mobility and there-ex. Great tolerances with there-ex and reporting hip felt much better and looser following exercises/ROM. LLD RTLE vs LT visual guided medial malleolar non-measured landmarks. Wanted to remain in hip chair following care. Call light and wall phone within reach. Very appreciative for care. Comments PM: In bed on arrival receptive for PM Tx. Ready to be discharged and leaving very soon. Pain under control and reporting felt better following mobility and there-ex. Amb with [employer] with antalgic gait pattern RTLE with a step through pattern without drifting, lateral veering or LOB. There-ex well tolerated with high engagement levels. LLD RTLE vs LT visual guided medial malleolar non-measured landmarks. Wanted to remain in hip chair following care. Call light and wall phone within reach. Very appreciative for care. Treatment:  Patient practiced and was instructed in the following treatment:     Amb   Bed mob   Education   There-ex    PLAN:    Pt is making Good progress toward established Physical Therapy goals. Continue with physical therapy current plan of care. Total Treatment Time  30 minutes     Evaluation Time includes thorough review of current medical information, gathering information on past medical history/social history and prior level of function, completion of standardized testing/informal observation of tasks, assessment of data and education on plan of care and goals.     CPT codes:  [] Low Complexity PT evaluation 63648  [] Moderate Complexity PT evaluation 73645  [] High Complexity PT evaluation 39228  [] PT Re-evaluation 75730  [x] Gait training 28936 15 minutes  [] Manual therapy 83922  minutes  [x] Therapeutic activities 87238  5 minutes  [x] Therapeutic exercises 18864 10 minutes  [] Neuromuscular reeducation 40834  minutes Bina Drew PT

## 2021-08-07 NOTE — PLAN OF CARE
Problem: Pain:  Goal: Pain level will decrease  Description: Pain level will decrease  8/7/2021 0007 by Arias Paris RN  Outcome: Met This Shift     Problem: Falls - Risk of:  Goal: Will remain free from falls  Description: Will remain free from falls  8/7/2021 0007 by Arias Paris RN  Outcome: Met This Shift

## 2021-08-07 NOTE — PROGRESS NOTES
Daily Treat    Evaluating Therapist: Carmen PT        Room #:  0553/7179-D  Diagnosis:  Primary osteoarthritis of right hip [M16.11]  PMHx/PSHx:  CAD, CHF, L THOM  Procedure/Surgery:  R THOM  Precautions:  WBAT, posterolateral hip precautions, falls        Social:  Pt lives alone in a 2 floor plan no steps to enter. Steps to second floor have 1 rail but pt states he holds wall on other side. States he will have family with him when first going home  Prior to admission independent without device       Initial Evaluation  Date: 8/6/21 Treatment     8/7/21 AM Short Term/ Long Term   Goals   Was pt agreeable to Eval/treatment? yes  Yes     Does pt have pain? R hip R hip      Bed Mobility  Rolling: NT  Supine to sit: supervision  Sit to supine: NT  Scooting: supervison  Indep all levels independnet   Transfers Sit to stand: supervison  Stand to sit: supervision  Stand pivot: supervison   Indep all surfaces independnet   Ambulation    125 feet and 150 with ww with supervison  150 feet x2 with ww Indep levels 200 feet with ww independent   Stair Negotiation  Ascended and descended 8 with 2 rails supervison  4 Indep with HR guide only   12 steps with 1 rail and wall independent   LE strength     R LE 3/5 L LE 4/5  RT hip 3-/5, knee 5/5       balance      Good with ww       AM-PAC Raw score               18/24  24/24            Pt is alert and oriented x3  Balance: Indep levels with [employer]      Therapeutic Exercises:  RT hip, knee and ankle x 30 reps     Patient education  Pt educated on WBAT status, Rosser hip prec, stair negotiation,     Patient response to education: Had prior LT THOM  Pt verbalized understanding Pt demonstrated skill Pt requires further education in this area   Yes Yes No     ASSESSMENT:    Comments: In bed on arrival receptive for session. Wants to go home today; Kalia Early will assist care if needed. Indep with all levels of mobility. Fully understands and maintains universal hip precautions.   Gait pattern step through pattern without drifting, lateral veering or LOB. Slower paced mobility but indep with no issues. Highly engaged with all mobility and there-ex. Great tolerances with there-ex and reporting hip felt much better and looser following exercises/ROM. LLD RTLE vs LT visual guided medial malleolar non-measured landmarks. Wanted to remain in hip chair following care. Call light and wall phone within reach. Very appreciative for care. Treatment:  Patient practiced and was instructed in the following treatment:     Amb   Steps   Bed mob   Education   There-ex    PLAN:    Pt is making Good progress toward established Physical Therapy goals. Continue with physical therapy current plan of care. Total Treatment Time  30 minutes     Evaluation Time includes thorough review of current medical information, gathering information on past medical history/social history and prior level of function, completion of standardized testing/informal observation of tasks, assessment of data and education on plan of care and goals.     CPT codes:  [] Low Complexity PT evaluation 97577  [] Moderate Complexity PT evaluation 18220  [] High Complexity PT evaluation 03327  [] PT Re-evaluation 19931  [x] Gait training 17822 15 minutes  [] Manual therapy 53462  minutes  [x] Therapeutic activities 85667  5 minutes  [x] Therapeutic exercises 94252 10 minutes  [] Neuromuscular reeducation 69317  minutes       Royden Gowers PT

## 2021-08-07 NOTE — PLAN OF CARE
Problem: Discharge Planning:  Goal: Knowledge of discharge instructions  Description: Knowledge of discharge instructions  Outcome: Met This Shift     Problem: Infection - Surgical Site:  Goal: Signs of wound healing will improve  Description: Signs of wound healing will improve  Outcome: Met This Shift     Problem: Mobility - Impaired:  Goal: Achieve maximum mobility level  Description: Achieve maximum mobility level  Outcome: Met This Shift     Problem: Pain - Acute:  Goal: Pain level will decrease  Description: Pain level will decrease  8/7/2021 0007 by Rafael Kirby RN  Outcome: Met This Shift

## 2021-08-07 NOTE — PROGRESS NOTES
[facility]   Progress Note    Admitting Date and Time: 8/5/2021  8:26 AM  Admit Dx: Primary osteoarthritis of right hip [M16.11]    Subjective:    Patient was admitted with Primary osteoarthritis of right hip [M16.11]. Doing well, wants to go home. ROS: denies fever, chills, cp, sob, n/v, HA unless stated above.      hydrALAZINE  25 mg Oral 3 times per day    isosorbide dinitrate  20 mg Oral Daily    metoprolol succinate  25 mg Oral Daily    spironolactone  25 mg Oral Daily    amiodarone  200 mg Oral Daily    apixaban  5 mg Oral BID    sodium chloride flush  5-40 mL Intravenous 2 times per day    acetaminophen  650 mg Oral Q6H    sennosides-docusate sodium  1 tablet Oral BID    famotidine (PEPCID) injection  20 mg Intravenous BID     perflutren lipid microspheres, 1.5 mL, ONCE PRN  sodium chloride flush, 5-40 mL, PRN  sodium chloride, 25 mL, PRN  ondansetron, 4 mg, Q8H PRN   Or  ondansetron, 4 mg, Q6H PRN  magnesium hydroxide, 30 mL, Daily PRN  oxyCODONE, 5 mg, Q4H PRN   Or  oxyCODONE, 10 mg, Q4H PRN  morphine, 2 mg, Q4H PRN         Objective:    /73   Pulse 73   Temp 98.2 °F (36.8 °C) (Oral)   Resp 18   Ht 6' (1.829 m)   Wt 232 lb (105.2 kg)   SpO2 95%   BMI 31.46 kg/m²   General Appearance: alert and oriented to person, place and time, well developed and well- nourished, in no acute distress  Skin: warm and dry, no rash or erythema  Head: normocephalic and atraumatic  Neck: supple and non-tender without mass, no thyromegaly or thyroid nodules, no cervical lymphadenopathy  Pulmonary/Chest: clear to auscultation bilaterally- no wheezes, rales or rhonchi, normal air movement, no respiratory distress  Cardiovascular: normal rate, regular rhythm, normal S1 and S2, no murmurs  Abdomen: soft, non-tender, non-distended  Extremities: mild edema bilateal      Recent Labs     08/06/21  0515 08/06/21  1050 08/07/21  0320   * 127* 130*   K 4.3  --  3.7  3.7   CL 92*  --  96*   CO2 25  --  28   BUN 21*  --  21*   CREATININE 1.5*  --  1.4*   GLUCOSE 140*  --  120*   CALCIUM 8.4*  --  8.7       Recent Labs     08/06/21  0515 08/07/21  0320   WBC 8.9 5.4   RBC 3.23* 3.10*   HGB 11.3* 11.0*   HCT 33.8* 32.6*   .6* 105.2*   MCH 35.0 35.5*   MCHC 33.4 33.7   RDW 12.8 12.9    176   MPV 9.7 9.9       Radiology:   XR HIP RIGHT (1 VIEW)   Final Result   Status post total right hip arthroplasty with normal alignment. Assessment:    Principal Problem:    Primary osteoarthritis of right hip  Active Problems:    Essential hypertension    Atrial flutter with rapid ventricular response (HCC)    CAD in native artery    Nonischemic cardiomyopathy ([address]  Resolved Problems:    * No resolved hospital problems. *      Plan:    1.  OA right hip for elective THOM  Doing well postoperatively day 2     On eliquis for history of CHF and a fib, no other DVT prophylaxis.     2. Hyponatremia  Likely due to excess water consumption with unopposed IVF and lasix. Trended downward yesterday.     Fluid restriction, hold lasix at this time. Today sodium is 130 - can be discharged. Hold lasix today, restart tomorrow.     3. History of cardiomyopthy and chronic CHF  No signs of heart failure now. Continue home meds - isordil, toprol XL, spironolactone.     4.  History of a fib  Continue amiodarone and eliquis.     Patient medically stable at this time. Stable for discharge home. Follow up with PCP in 1-2 weeks, reasonable to repeat BMP to make sure sodium is back to normal and renal function is stable.     Electronically signed by Franky Taylor MD on 8/7/2021 at 7:42 AM

## 2021-09-08 ENCOUNTER — TELEPHONE (OUTPATIENT)
Dept: ADMINISTRATIVE | Age: 61
End: 2021-09-08

## 2021-09-08 NOTE — TELEPHONE ENCOUNTER
Will call the patient back as soon as the October schedule is out.  There is no appointments on MDT days available

## 2021-10-18 NOTE — PROGRESS NOTES
Cardiac Electrophysiology  Progress Note    Jane Riley  1960  Date of Service: 10/19/2021  PCP: Gracie Lugo MD  PRIMARY ELECTROPHYSIOLOGIST: Caprice Lopez MD        Subjective: Jane Riley is seen for outpatient follow-up and management of ICD in situ. This is his first in-office follow-up since his post-op wound check. At that time, medication Amiodarone 200 mg QD was clarified with planned LFTs in one week. He followed up with Dr Javier Jon on 12/7/20 with planned echocardiogram to reassess LV function. He is not on GDMT due to intolerance with plan to increase Toprol XL to 200 mg QD. He was also advised to cut down alcohol use completely. An echocardiogram was ordered which is still pending. The patient currently feels well and offers no complaints from a device POV. The device site looks well healed and free from infection or erosion. The patient denies any chest pain, dyspnea, palpitations, dizziness, syncope, orthopnea or paroxysmal nocturnal dyspnea. The patient continues to be followed remotely. ICD interrogation today showed no recurrent VT.     Patient Active Problem List   Diagnosis    Primary osteoarthritis of left hip    Essential hypertension    Acute blood loss as cause of postoperative anemia    Osteoarthritis of multiple joints    New onset atrial flutter (HCC)    Acute decompensated heart failure ([address]    New onset of congestive heart failure (HCC)    Atrial flutter with rapid ventricular response (HCC)    Acidosis    Elevated LFTs    CAD in native artery    Acute diastolic CHF (congestive heart failure) (HCC)    Atrial flutter (HCC)    Nonischemic cardiomyopathy (HCC)    Ventricular tachycardia (HCC)    Defibrillator discharge    Primary osteoarthritis of right hip       Current Outpatient Medications   Medication Sig Dispense Refill    hydrALAZINE (APRESOLINE) 25 MG tablet Take 1 tablet by mouth every 8 hours 270 tablet 3    metoprolol succinate (TOPROL XL) 25 MG extended release tablet TAKE 3 TABLETS BY MOUTH EVERY NIGHT      furosemide (LASIX) 40 MG tablet Take 1 tablet by mouth daily 90 tablet 1    spironolactone (ALDACTONE) 25 MG tablet Take 1 tablet by mouth daily 90 tablet 1    isosorbide dinitrate (ISORDIL) 20 MG tablet TAKE 1 TABLET BY MOUTH THREE TIMES A DAY 90 tablet 11    ELIQUIS 5 MG TABS tablet TAKE 1 TABLET BY MOUTH TWICE A DAY 60 tablet 11    amiodarone (CORDARONE) 200 MG tablet Take 1 tablet by mouth daily 90 tablet 3     Current Facility-Administered Medications   Medication Dose Route Frequency Provider Last Rate Last Admin    perflutren lipid microspheres (DEFINITY) injection 1.65 mg  1.5 mL IntraVENous ONCE PRN Barney Conroy MD             Allergies   Allergen Reactions    Ace Inhibitors Swelling    Lisinopril Swelling     Angioedema    5-Alpha Reductase Inhibitors        ROS:   Constitutional: Negative for fever, activity change and appetite change. HENT: Negative for epistaxis. Eyes: Negative for diploplia, blurred vision. Respiratory: Negative for cough, chest tightness, shortness of breath and wheezing. Cardiovascular: pertinent positives in HPI  Gastrointestinal: Negative for abdominal pain and blood in stool. All other review of systems are negative       PHYSICAL EXAM:  Vitals:    10/19/21 1016   BP: 136/84   Site: Right Upper Arm   Position: Sitting   Cuff Size: Medium Adult   Pulse: 79   Resp: 16   Weight: 249 lb (112.9 kg)   Height: 6' (1.829 m)     Constitutional: Well-developed, no acute distress  Eyes: conjunctivae normal, no xanthelasma   Ears, Nose, Throat: oral mucosa moist, no cyanosis   CV: no JVD. Regular rate and rhythm. Normal S1S2 and no S3. No murmurs, rubs, or gallops.  PMI is nondisplaced  Lungs: clear to auscultation bilaterally, normal respiratory effort without used of accessory muscles  Abdomen: soft, non-tender, bowel sounds present, no masses or hepatomegaly   Musculoskeletal: no digital clubbing, no edema   Skin: warm, no rashes   Device site:, well healed. No erosion, infection or migration    Pertinent Cardiac Testing:    Echocardiogram 08/16/20:    Findings      Left Ventricle   Left ventricle is moderately enlarged .   Ejection fraction is visually estimated at 20-25%. Atrial fibrillation may   affect the evaluation of LV systolic function.   Overall ejection fraction severely decreased .   Abnormal LV diastolic function with elevated filling pressures (E/e' >11).     Right Ventricle  Larina Moder dilated right ventricle.   Right ventricle global systolic function is low normal . TAPSE 17 mm.      Left Atrium   The left atrium is severely dilated.   Interatrial septum appears intact.   No evidence of patent foramen ovale.      Right Atrium   Markedly enlarged right atrium size.      Mitral Valve   Normal mitral valve structure and function.   No evidence of mitral valve stenosis.   Moderate to severe mitral regurgitation with centrally directed jet.      Tricuspid Valve   The tricuspid valve appears structurally normal.   Moderate tricuspid regurgitation.  RVSP is 36 mmHg. Normal estimated PA systolic pressure.      Aortic Valve   Structurally normal aortic valve.   The aortic valve is trileaflet.   No hemodynamically significant aortic stenosis is present.   No evidence of aortic valve regurgitation.      Pulmonic Valve   Pulmonic valve is structurally normal.   Physiologic and/or trace pulmonic regurgitation present.   No evidence of pulmonic valve stenosis.     Pericardial Effusion   No evidence for hemodynamically significant pericardial effusion.      Pleural Effusion   No evidence of pleural effusion.      Aorta   Normal aortic root and ascending aorta.   Miscellaneous   Dilated Inferior Vena Cava.   Inferior Vena Cava, normal respiratory variation.      Conclusions      Summary   Left ventricle is moderately enlarged .   Ejection fraction is visually estimated at 20-25%.  Atrial fibrillation may   affect the evaluation of LV systolic function.   Overall ejection fraction severely decreased .   Abnormal LV diastolic function with elevated filling pressures (E/e' >11).  The left atrium is severely dilated.   Mildly dilated right ventricle.   Right ventricle global systolic function is low normal . TAPSE 17 mm.   Moderate to severe mitral regurgitation with centrally directed jet.   No hemodynamically significant aortic stenosis is present.   Moderate tricuspid regurgitation.  RVSP is 36 mmHg. Normal estimated PA systolic pressure.   No evidence for hemodynamically significant pericardial effusion.   No previous echo for comparison.      Signature      ----------------------------------------------------------------   Electronically signed by Kirk Seaman MD(Interpreting   physician) on 08/16/2020 05:23 PM   ----------------------------------------------------------------     M-Mode/2D Measurements & Calculations      LV Diastolic    LV Systolic Dimension: 5.8   AV Cusp Separation: 1.9 cmLA   Dimension: 6.7  cm                           Dimension: 5.4 cmAO Root   cm              LV Volume Diastolic: 753. 6   Dimension: 3.2 cm   LV FS:13.4 %    ml   LV PW           LV Volume Systolic: 997.8 ml   Diastolic: 0.9  LV EDV/LV EDV Index: 233.6   cm              ml/102 ml/m^2LV ESV/LV ESV   RV Diastolic Dimension: 3.6   LV PW Systolic: Index: 897.9 YT/73YK/ m^2    cm   1.2 cm          EF Calculated: 29.4 %   Septum          LV Mass Index: 114 l/min*m^2 LA/Aorta: 4.70   Diastolic: 0.9  LV Length: 9.1 cm            Ascending Aorta: 3.5 cm   cm                                           LA volume/Index: 182.3 ml   Septum          LVOT: 2.1 cm                 /99GC/V^9   Systolic: 1.1                                RA Area: 30.6 cm^2   cm   CO: 3.88 l/min                               IVC Expiration: 3.1 cm   CI: 1.69   l/m*m^2   LV Mass: 261.32   g     Doppler Measurements & Calculations      MV Peak was transferred from [facility]  today to undergo left heart catheterization due to newly diagnosed CHF  and cardiomyopathy with severe systolic dysfunction.  The patient was  brought to the cardiac cath lab in his usual fasting state.  Under  sterile condition and under local anesthetic and using conscious  sedation, a 6-[place] Terumo slender sheath was inserted into the right  radial artery.  The patient received 5000 units of intravenous heparin. He also received 300 mcg of intraarterial nitroglycerin via the right  radial sheath.  Then I could not advance a J-tip wire across the distal  radial artery.  The patient received 2.5 mg of diluted verapamil and 200  mcg of intra-arterial nitroglycerin via the right radial sheath.  Then,  a Wholey wire was advanced to the ascending aorta with little difficulty  due to angulated right subclavian artery.  Then a 5-[place] TIG  diagnostic catheter was advanced to the ascending aorta and the left  main coronary artery was engaged and a coronary angiogram was done. This catheter was used to engage the right coronary artery and a  coronary angiogram was done.  This catheter was exchanged over the  guidewire to a 5-[place] pigtail which was advanced into the left  ventricle without difficulty.  The left ventricular end-diastolic  pressure was measured.  No left ventriculogram was done due to elevated  creatinine and due to known ejection fraction by echocardiogram.  At the  end of the procedure, the pigtail was pulled out.  The Terumo slender  sheath was pulled out and a Vasc Band was applied over the right radial  artery with good hemostasis.  The patient tolerated the procedure very  well and no complications were noted.  No significant blood loss  occurred during the procedure.     FINDINGS:  HEMODYNAMIC RESULTS:  1.  Aortic pressure 134/78 mmHg.   2.  Left ventricular end-diastolic pressure 23 mmHg.     CORONARY ANATOMY:  Left main:  It is a long and large artery with no angiographic stenosis  noted. LAD:  It is a large artery wrapping around the apex and giving rise to  two diagonal branches and a large first septal  and smaller  septal branches.  The first diagonal was small.  The second diagonal was  large and bifurcating.  No angiographic stenosis noted in the LAD or its  branches. Left circumflex:  It is a large artery giving rise to three obtuse  marginal branches.  The first and second obtuse marginal branches were  very small.  The third obtuse marginal branch was bifurcating and  tortuous.  No angiographic stenosis noted. RCA: Crystal Sebastian is a large dominant artery giving rise to a conus branch, SA  woody or AV woody branch, an RV marginal branch, a large PDA and a large  PLV branch.  There was questionable 50% discrete distal RCA stenosis  versus most likely overlap of the proximal segment of the PDA and of  the PLV branch.     IMPRESSION:  1.  Questionable 50% discrete distal RCA stenosis versus most likely  overlap of the proximal segment of PLV and PDA. 2.  Absence of angiographic stenosis in the left main, LAD or left  circumflex. 3.  Elevated LVEDP at 23 mmHg.     RECOMMENDATIONS:  1.  GDMT.   2.  Aggressive medical therapy.     PROCEDURE START TIME:  17:22 p.m.     PROCEDURE END TIME:  17:43 p.m.     CONTRAST VOLUME:  41 mL of Optiray.     FLUOROSCOPY TIME:  3.1 minutes.     CONSCIOUS SEDATION:  2 mg of Versed and 100 mcg of intravenous fentanyl.           Everett Perez MD     LifeVest 8/21/20:        EKG 10/19/21 : A paced V sensed 79, low voltage QRS, NS ST-T change, QTc 435msec - see scanned cardiology    Device Interrogation 10/19/21:  Make/Model Medtronic Evera  Mode AAIR--> DDDR 60/130  P wave: 5.1 mV  Impedance: 456 ohms   Threshold: 0.75 V @ 0.4 ms  RV R wave: 1.9 mV  Impedance: 380 ohms   Threshold: 0.5V @ 0.4 ms  Pacing: A: 21.1%  RV: <0.1%    Battery Voltage/Longevity: 9.3years, charge time: 3.9 seconds  Arrhythmias: none   Reprogramming included none   Overall device function is normal  All device programmable settings were evaluated per above and in the scanned document, along with iterative adjustments (capture thresholds) to assess and select the most appropriate final programming to provide for consistent delivery of the appropriate therapy and to verify function of the device. Impression:    1. ICD in situ   - DOI 08/26/2020    - Medtronic Dr. Delfin Nageotte   - Secondary prevention    - Normal device function    2. Sustained ventricular tachycardia  - Polymorphic ventricular tachycardia and ventricular fibrillation on 08/21/20 greater than 30 sec required WCD shock.   - No recurrent VT since. - S/p ICD implantation: see below    3. HFrEF   - Nonischemic cardiomyopathy.  - First diagnosed 08/16/2020  - Cardiac cath 8/20/2020 showed nonobstructive CAD. - LVEF 20-25% on TTE 08/16/20  - Compensated and euvolemic  - NYHA class II, ACC stage C  - On GDMT:  Isordil, Hydralazine, Aldactone, Toprol and Lasix. - Angioedema with ACE-I     4. Atrial flutter   - First seen 08/16/2020  - [id number] 3  (CHF, HTN, CAD)  - On Eliquis for stroke risk reduction  - Status post DC-CV on 08/19/2020  - Started on Amiodarone on 08/20/2020  - On Toprol XL for rate control.  - AF burden 0%. - Presents in NSR with stable QTc.  - Re-education on importance of well controlled HTN (goal BP < 130/80), adequate weight control (goal BMI of < 27), physical activity consisting of moderate cardiopulmonary exercise up to a goal of 250 min/wk, daily compliance with CPAP in treating sleep apnea, smoking cessation and limited ETOH intake. 5. Valvular heart disease   - Moderate to severe MR and moderate TR on TTE 8/16/20     6. CKD   Estimated Creatinine Clearance: 74 mL/min (A) (based on SCr of 1.4 mg/dL (H)).     7. Hypertension   - On  Isordil, hydralazine, Aldactone, Toprol and Lasix.     8.  Coronary artery disease  - 50% discrete distal RCA stenosis versus most likely  overlap of the proximal segment of PLV and PDA.     9. Elevated LFT's   - Mildly elevated and stable.     10. Tobacco abuse    Recommendations:    1. Normal ICD function. 2. Will decrease Amiodarone to 100 mg daily. 3. Continue Eliquis 5 mg bid. 4. Continue follow-up with Cardiology as recommended. 5. Obtain EKG, TFT and LFT every 3 to 6 months while on Amiodaorone. 6. Remote transmission in 31 days. 7. Office follow-up in 3 months or sooner as needed. 8. He was asked to call the office with EP concerns prior to follow-up. Thank you very much to allowing me to participate in the patient's care. I have spent a total of 40 minutes with the patient and the family reviewing the above stated recommendations. And a total of >50% of that time involved face-to-face time providing counseling and/or coordination of care with the other providers, preparation for the clinic visit, reviewing records/tests, counseling/education of the patient, ordering medications/tests/procedures, coordinating care, and documenting clinical information in the EHR.      Stevo Benítez MD  Cardiac Electrophysiology  [facility]  [facility] [place]  10:27 AM  10/19/2021

## 2021-10-19 ENCOUNTER — OFFICE VISIT (OUTPATIENT)
Dept: NON INVASIVE DIAGNOSTICS | Age: 61
End: 2021-10-19
Payer: COMMERCIAL

## 2021-10-19 VITALS
BODY MASS INDEX: 33.72 KG/M2 | RESPIRATION RATE: 16 BRPM | HEART RATE: 79 BPM | SYSTOLIC BLOOD PRESSURE: 136 MMHG | HEIGHT: 72 IN | DIASTOLIC BLOOD PRESSURE: 84 MMHG | WEIGHT: 249 LBS

## 2021-10-19 DIAGNOSIS — Z95.810 IMPLANTABLE CARDIOVERTER-DEFIBRILLATOR (ICD) IN SITU: Primary | ICD-10-CM

## 2021-10-19 DIAGNOSIS — I48.92 ATRIAL FLUTTER WITH RAPID VENTRICULAR RESPONSE (HCC): ICD-10-CM

## 2021-10-19 PROCEDURE — 99215 OFFICE O/P EST HI 40 MIN: CPT | Performed by: INTERNAL MEDICINE

## 2021-10-19 RX ORDER — AMIODARONE HYDROCHLORIDE 200 MG/1
100 TABLET ORAL DAILY
Qty: 45 TABLET | Refills: 1 | Status: SHIPPED
Start: 2021-10-19 | End: 2021-11-22

## 2021-11-20 DIAGNOSIS — I48.92 ATRIAL FLUTTER WITH RAPID VENTRICULAR RESPONSE (HCC): ICD-10-CM

## 2021-11-22 RX ORDER — SPIRONOLACTONE 25 MG/1
TABLET ORAL
Qty: 90 TABLET | Refills: 3 | Status: SHIPPED | OUTPATIENT
Start: 2021-11-22

## 2021-11-22 RX ORDER — AMIODARONE HYDROCHLORIDE 200 MG/1
TABLET ORAL
Qty: 90 TABLET | Refills: 3 | Status: SHIPPED | OUTPATIENT
Start: 2021-11-22

## 2021-11-22 RX ORDER — FUROSEMIDE 40 MG/1
TABLET ORAL
Qty: 90 TABLET | Refills: 3 | Status: SHIPPED | OUTPATIENT
Start: 2021-11-22

## 2021-12-03 ENCOUNTER — TELEPHONE (OUTPATIENT)
Dept: NON INVASIVE DIAGNOSTICS | Age: 61
End: 2021-12-03

## 2021-12-21 ENCOUNTER — TELEPHONE (OUTPATIENT)
Dept: NON INVASIVE DIAGNOSTICS | Age: 61
End: 2021-12-21

## 2021-12-21 NOTE — LETTER
December 27, 2021      Dear Mr. Dotty Emmanuel,    The office has been trying to reach you regarding a recent remote transmission on your ICD. Dr. Vannessa Cope would like to make a medication change. If you can please contact our office at [phone] ext. [phone], one of the nurses will go over the changes. Sincerely,      Davey Diaz RN BSN   [facility]  [address] [place] [address], [address] [place] [address]

## 2021-12-21 NOTE — TELEPHONE ENCOUNTER
Tried calling patient regarding remote transmission from today. I wanted to ask if he missed any doses of his Toprol or Amiodarone. No answer at his phone, no voicemail available. I will try again later.     Edgardo Calderon RN, BSN  [facility]

## 2021-12-22 NOTE — TELEPHONE ENCOUNTER
Attempted to call patient again. Patient still not available. Will try again later.     Joe Carcamo RN, BSN  [facility]

## 2021-12-22 NOTE — TELEPHONE ENCOUNTER
Tried calling the phone number again. Still no answer/no voicemail available.     Camden Gardner RN, BSN  [facility]

## 2021-12-23 NOTE — TELEPHONE ENCOUNTER
Still not able to reach patient. Phone rings then message, to try call again later. No voicemail option.     Davey Diaz RN, BSN  [facility]

## 2021-12-27 NOTE — TELEPHONE ENCOUNTER
Tried calling the patient again. Same message. Mailed letter to patient asking him to contact office regarding medication changes.     Miguel Marroquin RN, BSN  [facility]

## 2022-02-16 DIAGNOSIS — Z92.29 HX OF AMIODARONE THERAPY: Primary | ICD-10-CM

## 2022-04-01 ENCOUNTER — TELEPHONE (OUTPATIENT)
Dept: NON INVASIVE DIAGNOSTICS | Age: 62
End: 2022-04-01

## 2022-04-26 RX ORDER — APIXABAN 5 MG/1
TABLET, FILM COATED ORAL
Qty: 60 TABLET | Refills: 11 | Status: SHIPPED | OUTPATIENT
Start: 2022-04-26

## 2022-06-06 RX ORDER — ISOSORBIDE DINITRATE 20 MG/1
TABLET ORAL
Qty: 270 TABLET | Refills: 0 | Status: SHIPPED | OUTPATIENT
Start: 2022-06-06

## 2022-07-19 RX ORDER — HYDRALAZINE HYDROCHLORIDE 25 MG/1
25 TABLET, FILM COATED ORAL EVERY 8 HOURS SCHEDULED
Qty: 270 TABLET | Refills: 0 | Status: SHIPPED | OUTPATIENT
Start: 2022-07-19

## 2023-02-15 RX ORDER — SPIRONOLACTONE 25 MG/1
TABLET ORAL
Qty: 90 TABLET | Refills: 0 | Status: SHIPPED | OUTPATIENT
Start: 2023-02-15

## 2023-02-15 RX ORDER — FUROSEMIDE 40 MG/1
TABLET ORAL
Qty: 90 TABLET | Refills: 0 | Status: SHIPPED | OUTPATIENT
Start: 2023-02-15

## 2023-04-17 ENCOUNTER — TELEPHONE (OUTPATIENT)
Dept: NON INVASIVE DIAGNOSTICS | Age: 63
End: 2023-04-17

## 2023-04-17 NOTE — TELEPHONE ENCOUNTER
----- Message from Andrzej Carey MD sent at 4/11/2023 10:28 PM EDT -----  Please make sure that he did not miss any medications. Thanks.  ----- Message -----  From: Claire Finn RN  Sent: 4/11/2023  11:01 AM EDT  To: Andrzej Carey MD    Please see Hannah Giles report 04/11/2023. Patient has an Evera dual chamber. Episode binned as VF detected 11-Apr-2023 at 02:26  Duration 16s, avg A/V rate 261/261 bpm  Review of EGM- query SVT/AVNRT (onset unclear) followed by AFL with 1:1 conduction in VF Zone (r/o dual tach) >>36.9J Shock delivered and successfully terminated the arrhythmia  Morphology discriminators show no match      2 HVR episodes treated with ATP on 29-Mar-2023 between 08:31 - 08:33  Longest duration 33s, avg V rates 182 - 188 bpm    21 episodes binned as VT Monitor since 3/2023 report  Longest duration 10:57(M:S), avg V rates 156 - 182 bpm  EGMs appear c/w SVT/AVNRT in or near VT Monitor & VT Zone    Attempted to call, got no answer. Will try again. Med list includes amiodarone, metoprolol, lasix, aldactone, and eliquis.

## 2023-05-01 ENCOUNTER — OFFICE VISIT (OUTPATIENT)
Dept: NON INVASIVE DIAGNOSTICS | Age: 63
End: 2023-05-01
Payer: COMMERCIAL

## 2023-05-01 VITALS
DIASTOLIC BLOOD PRESSURE: 82 MMHG | SYSTOLIC BLOOD PRESSURE: 134 MMHG | HEART RATE: 138 BPM | WEIGHT: 282 LBS | HEIGHT: 72 IN | RESPIRATION RATE: 16 BRPM | BODY MASS INDEX: 38.19 KG/M2

## 2023-05-01 DIAGNOSIS — Z91.199 NONCOMPLIANCE: ICD-10-CM

## 2023-05-01 DIAGNOSIS — I42.8 NONISCHEMIC CARDIOMYOPATHY (HCC): ICD-10-CM

## 2023-05-01 DIAGNOSIS — I48.92 ATRIAL FLUTTER WITH RAPID VENTRICULAR RESPONSE (HCC): ICD-10-CM

## 2023-05-01 DIAGNOSIS — Z45.02 DEFIBRILLATOR DISCHARGE: ICD-10-CM

## 2023-05-01 DIAGNOSIS — I47.20 VENTRICULAR TACHYCARDIA (HCC): ICD-10-CM

## 2023-05-01 DIAGNOSIS — Z95.810 IMPLANTABLE CARDIOVERTER-DEFIBRILLATOR (ICD) IN SITU: Primary | ICD-10-CM

## 2023-05-01 LAB
ALBUMIN SERPL-MCNC: 4.3 G/DL (ref 3.5–5.2)
ALP SERPL-CCNC: 75 U/L (ref 40–129)
ALT SERPL-CCNC: 36 U/L (ref 0–40)
ANION GAP SERPL CALCULATED.3IONS-SCNC: 14 MMOL/L (ref 7–16)
AST SERPL-CCNC: 37 U/L (ref 0–39)
BILIRUB SERPL-MCNC: 0.7 MG/DL (ref 0–1.2)
BUN SERPL-MCNC: 11 MG/DL (ref 6–23)
CALCIUM SERPL-MCNC: 9.4 MG/DL (ref 8.6–10.2)
CHLORIDE SERPL-SCNC: 102 MMOL/L (ref 98–107)
CO2 SERPL-SCNC: 24 MMOL/L (ref 22–29)
CREAT SERPL-MCNC: 1.1 MG/DL (ref 0.7–1.2)
ERYTHROCYTE [DISTWIDTH] IN BLOOD BY AUTOMATED COUNT: 12.6 FL (ref 11.5–15)
GLUCOSE SERPL-MCNC: 72 MG/DL (ref 74–99)
HCT VFR BLD AUTO: 49 % (ref 37–54)
HGB BLD-MCNC: 15.9 G/DL (ref 12.5–16.5)
MAGNESIUM SERPL-MCNC: 1.9 MG/DL (ref 1.6–2.6)
MCH RBC QN AUTO: 34 PG (ref 26–35)
MCHC RBC AUTO-ENTMCNC: 32.4 % (ref 32–34.5)
MCV RBC AUTO: 104.9 FL (ref 80–99.9)
PLATELET # BLD AUTO: 265 E9/L (ref 130–450)
PMV BLD AUTO: 11.8 FL (ref 7–12)
POTASSIUM SERPL-SCNC: 4 MMOL/L (ref 3.5–5)
PROT SERPL-MCNC: 7.6 G/DL (ref 6.4–8.3)
RBC # BLD AUTO: 4.67 E12/L (ref 3.8–5.8)
SODIUM SERPL-SCNC: 140 MMOL/L (ref 132–146)
TSH SERPL-MCNC: 2 UIU/ML (ref 0.27–4.2)
WBC # BLD: 4.9 E9/L (ref 4.5–11.5)

## 2023-05-01 PROCEDURE — 93000 ELECTROCARDIOGRAM COMPLETE: CPT | Performed by: INTERNAL MEDICINE

## 2023-05-01 PROCEDURE — 3079F DIAST BP 80-89 MM HG: CPT | Performed by: NURSE PRACTITIONER

## 2023-05-01 PROCEDURE — 99214 OFFICE O/P EST MOD 30 MIN: CPT | Performed by: NURSE PRACTITIONER

## 2023-05-01 PROCEDURE — 93283 PRGRMG EVAL IMPLANTABLE DFB: CPT | Performed by: INTERNAL MEDICINE

## 2023-05-01 PROCEDURE — 3075F SYST BP GE 130 - 139MM HG: CPT | Performed by: NURSE PRACTITIONER

## 2023-05-01 RX ORDER — AMIODARONE HYDROCHLORIDE 200 MG/1
200 TABLET ORAL DAILY
Qty: 90 TABLET | Refills: 1 | Status: SHIPPED | OUTPATIENT
Start: 2023-05-01

## 2023-05-01 RX ORDER — METOPROLOL SUCCINATE 25 MG/1
75 TABLET, EXTENDED RELEASE ORAL DAILY
Qty: 270 TABLET | Refills: 1 | Status: SHIPPED | OUTPATIENT
Start: 2023-05-01

## 2023-05-01 NOTE — PROGRESS NOTES
Lab work drawn today from Right Arm . Patient tolerated procedure well.     Electronically signed by Karen Hagen MA on 5/1/2023 at 10:23 AM

## 2023-05-01 NOTE — PROGRESS NOTES
[address]  [employer] [address] [facility] Electrophysiology  Outpatient Progress Note  Brennon Russo  1960  Date of Service: 5/1/2023  PCP: Miranda Smith MD  Cardiologist: Dr Isabela Patterson   Electrophysiologist: Dr. Rosemarie Yo        Subjective: Brennon Russo is seen for follow-up and management of: ICD     Last seen in the office with Dr. Rosemarie Yo on 10/19/2021    PMH as noted below significant for atrial flutter, on anticoagulation with Eliquis, nonischemic cardiomyopathy with hx of sustained VT, prescribed amiodarone, noncompliance. At last OV in 2021, he had decrease in amio 10/2021 and repeat remote in 1 months showed recurrence of VT on device. The office was not able to reach patient by phone multiple times. In 4/2023, VT/VF/AFL episodes detected on 4/11/23 at 2:26 am with shock and termination. He was awake standing with shock. No symptoms prior to episode. Not taking his meds. No reason, possible depression. No drug use. Agreeable to restart meds. States he is concerned that he has had weight gain in past 4 years. Was in RVR on EKG, but back to SR and verified with device check by end of visit. He denies any syncope, chest pain, SOBOE or swelling.        Patient Active Problem List   Diagnosis    Primary osteoarthritis of left hip    Essential hypertension    Acute blood loss as cause of postoperative anemia    Osteoarthritis of multiple joints    New onset atrial flutter (HCC)    Acute decompensated heart failure ([facility] [address]    New onset of congestive heart failure (HCC)    Atrial flutter with rapid ventricular response (HCC)    Acidosis    Elevated LFTs    CAD in native artery    Acute diastolic CHF (congestive heart failure) (HCC)    Atrial flutter (HCC)    Nonischemic cardiomyopathy (HCC)    Ventricular tachycardia (HCC)    Defibrillator discharge    Primary osteoarthritis of right hip       Current Outpatient Medications   Medication Sig Dispense Refill

## 2023-05-17 RX ORDER — FUROSEMIDE 40 MG/1
TABLET ORAL
Qty: 90 TABLET | Refills: 0 | Status: SHIPPED | OUTPATIENT
Start: 2023-05-17

## 2023-05-17 RX ORDER — SPIRONOLACTONE 25 MG/1
TABLET ORAL
Qty: 90 TABLET | Refills: 0 | Status: SHIPPED | OUTPATIENT
Start: 2023-05-17

## 2023-06-20 DIAGNOSIS — I47.20 VT (VENTRICULAR TACHYCARDIA) (HCC): Primary | ICD-10-CM

## 2023-06-20 RX ORDER — REGADENOSON 0.08 MG/ML
0.4 INJECTION, SOLUTION INTRAVENOUS
OUTPATIENT
Start: 2023-06-20

## 2023-06-23 ENCOUNTER — TELEPHONE (OUTPATIENT)
Dept: NON INVASIVE DIAGNOSTICS | Age: 63
End: 2023-06-23

## 2023-06-23 NOTE — TELEPHONE ENCOUNTER
----- Message from Ayala Stanley MD sent at 6/20/2023  9:12 AM EDT -----  Please check BMP and magnesium. Please order stress test. Please ask him to come in to program the RV lead sensing to integrated bipolar.  Thanks.  ----- Message -----  From: Jennifer Gomez RN  Sent: 6/20/2023   8:36 AM EDT  To: Ayala Stanley MD    Please see Murj report 06/20/2023    RV Lead Oversensing: T-wave  1  25 episodes binned as VT-NS detected since 5/2023 (decreased)  5 EGMs available for review  VT-- c/w 3s PAT w/ RVR, PVCs  Remaining EGMs are c/w AsVs ~80- 90 bpm w/intermittent TWOS w/ detection in VF Zone  Per [facility] (7/2022):  r/o transient electrolyte abnormalities, ischemia  Hx low R-waves in bipolar, consider programming integrated bipolar

## 2023-07-06 ENCOUNTER — TELEPHONE (OUTPATIENT)
Dept: CARDIOLOGY | Age: 63
End: 2023-07-06

## 2023-07-06 NOTE — TELEPHONE ENCOUNTER
Tried to reach patient to schedule nuclear stress. Unable to leave a message.   Electronically signed by Edu Calixto on 7/6/2023 at 9:53 AM

## 2023-07-12 ENCOUNTER — TELEPHONE (OUTPATIENT)
Dept: CARDIOLOGY | Age: 63
End: 2023-07-12

## 2023-07-12 NOTE — TELEPHONE ENCOUNTER
Unable to leave message to schedule nuclear stress test.  Electronically signed by Ulises Hagen on 7/12/2023 at 1:48 PM

## 2023-08-28 RX ORDER — FUROSEMIDE 40 MG/1
TABLET ORAL
Qty: 90 TABLET | Refills: 0 | OUTPATIENT
Start: 2023-08-28

## 2023-09-26 RX ORDER — HYDRALAZINE HYDROCHLORIDE 25 MG/1
25 TABLET, FILM COATED ORAL EVERY 8 HOURS SCHEDULED
Qty: 270 TABLET | Refills: 0 | Status: SHIPPED | OUTPATIENT
Start: 2023-09-26

## 2023-12-22 RX ORDER — FUROSEMIDE 40 MG/1
TABLET ORAL
Qty: 90 TABLET | Refills: 0 | OUTPATIENT
Start: 2023-12-22

## 2023-12-22 RX ORDER — SPIRONOLACTONE 25 MG/1
TABLET ORAL
Qty: 90 TABLET | Refills: 0 | OUTPATIENT
Start: 2023-12-22

## 2024-01-02 DIAGNOSIS — I48.92 ATRIAL FLUTTER WITH RAPID VENTRICULAR RESPONSE (HCC): ICD-10-CM

## 2024-01-02 RX ORDER — AMIODARONE HYDROCHLORIDE 200 MG/1
200 TABLET ORAL DAILY
Qty: 90 TABLET | Refills: 1 | Status: SHIPPED
Start: 2024-01-02 | End: 2024-01-02 | Stop reason: DRUGHIGH

## 2024-01-02 RX ORDER — HYDRALAZINE HYDROCHLORIDE 25 MG/1
25 TABLET, FILM COATED ORAL EVERY 8 HOURS SCHEDULED
Qty: 270 TABLET | Refills: 0 | Status: SHIPPED | OUTPATIENT
Start: 2024-01-02

## 2024-01-02 RX ORDER — AMIODARONE HYDROCHLORIDE 200 MG/1
200 TABLET ORAL DAILY
Qty: 90 TABLET | Refills: 0 | Status: SHIPPED | OUTPATIENT
Start: 2024-01-02

## 2024-01-02 NOTE — TELEPHONE ENCOUNTER
Review of Systems     Objective:  There were no vitals taken for this visit.  Physical Exam       Lab Results   Component Value Date/Time     05/01/2023 10:03 AM    K 4.0 05/01/2023 10:03 AM    K 3.7 08/07/2021 03:20 AM     05/01/2023 10:03 AM    CO2 24 05/01/2023 10:03 AM    BUN 11 05/01/2023 10:03 AM    CREATININE 1.1 05/01/2023 10:03 AM    GLUCOSE 72 05/01/2023 10:03 AM    CALCIUM 9.4 05/01/2023 10:03 AM    PROT 7.6 05/01/2023 10:03 AM    LABALBU 4.3 05/01/2023 10:03 AM    BILITOT 0.7 05/01/2023 10:03 AM    AST 37 05/01/2023 10:03 AM    ALT 36 05/01/2023 10:03 AM      Lab Results   Component Value Date/Time     05/01/2023 10:03 AM    K 4.0 05/01/2023 10:03 AM    K 3.7 08/07/2021 03:20 AM     05/01/2023 10:03 AM    CO2 24 05/01/2023 10:03 AM    BUN 11 05/01/2023 10:03 AM    CREATININE 1.1 05/01/2023 10:03 AM    GLUCOSE 72 05/01/2023 10:03 AM    CALCIUM 9.4 05/01/2023 10:03 AM      Lab Results   Component Value Date/Time    ALKPHOS 75 05/01/2023 10:03 AM    ALT 36 05/01/2023 10:03 AM    AST 37 05/01/2023 10:03 AM    PROT 7.6 05/01/2023 10:03 AM    BILITOT 0.7 05/01/2023 10:03 AM    BILIDIR 0.3 08/20/2020 04:23 AM    LABALBU 4.3 05/01/2023 10:03 AM      Lab Results   Component Value Date/Time    TSH 2.000 05/01/2023 10:03 AM

## 2024-01-05 ENCOUNTER — TELEPHONE (OUTPATIENT)
Dept: NON INVASIVE DIAGNOSTICS | Age: 64
End: 2024-01-05

## 2024-01-23 RX ORDER — METOPROLOL SUCCINATE 25 MG/1
75 TABLET, EXTENDED RELEASE ORAL DAILY
Qty: 270 TABLET | Refills: 1 | Status: SHIPPED | OUTPATIENT
Start: 2024-01-23

## 2024-03-26 RX ORDER — SPIRONOLACTONE 25 MG/1
25 TABLET ORAL DAILY
Qty: 90 TABLET | Refills: 0 | OUTPATIENT
Start: 2024-03-26

## 2024-03-26 RX ORDER — FUROSEMIDE 40 MG/1
40 TABLET ORAL DAILY
Qty: 90 TABLET | Refills: 0 | OUTPATIENT
Start: 2024-03-26

## 2024-05-02 ENCOUNTER — TELEPHONE (OUTPATIENT)
Dept: CARDIOLOGY CLINIC | Age: 64
End: 2024-05-02

## 2024-05-02 NOTE — TELEPHONE ENCOUNTER
Called the patient to verify if the device monitor is connected. I Informed the patient in the message to return my call. The patient doesn't have any future appointments.    Plan:   Overdue for office visit. Forwarding to scheduling.

## 2024-05-03 ENCOUNTER — OFFICE VISIT (OUTPATIENT)
Dept: CARDIOLOGY CLINIC | Age: 64
End: 2024-05-03

## 2024-05-03 VITALS
HEIGHT: 72 IN | WEIGHT: 291.4 LBS | BODY MASS INDEX: 39.47 KG/M2 | SYSTOLIC BLOOD PRESSURE: 140 MMHG | HEART RATE: 63 BPM | RESPIRATION RATE: 18 BRPM | DIASTOLIC BLOOD PRESSURE: 90 MMHG

## 2024-05-03 DIAGNOSIS — I50.9 NEW ONSET OF CONGESTIVE HEART FAILURE (HCC): ICD-10-CM

## 2024-05-03 DIAGNOSIS — I42.8 NONISCHEMIC CARDIOMYOPATHY (HCC): ICD-10-CM

## 2024-05-03 DIAGNOSIS — I25.10 CAD IN NATIVE ARTERY: ICD-10-CM

## 2024-05-03 DIAGNOSIS — I48.92 NEW ONSET ATRIAL FLUTTER (HCC): ICD-10-CM

## 2024-05-03 DIAGNOSIS — I48.3 TYPICAL ATRIAL FLUTTER (HCC): ICD-10-CM

## 2024-05-03 DIAGNOSIS — I10 ESSENTIAL HYPERTENSION: Chronic | ICD-10-CM

## 2024-05-03 DIAGNOSIS — M15.8 OTHER OSTEOARTHRITIS INVOLVING MULTIPLE JOINTS: Chronic | ICD-10-CM

## 2024-05-03 DIAGNOSIS — I47.20 VENTRICULAR TACHYCARDIA (HCC): ICD-10-CM

## 2024-05-03 DIAGNOSIS — I50.22 CHRONIC SYSTOLIC CONGESTIVE HEART FAILURE (HCC): Primary | ICD-10-CM

## 2024-05-03 DIAGNOSIS — M16.11 PRIMARY OSTEOARTHRITIS OF RIGHT HIP: ICD-10-CM

## 2024-05-03 RX ORDER — FUROSEMIDE 40 MG/1
40 TABLET ORAL DAILY
Qty: 90 TABLET | Refills: 3 | Status: SHIPPED | OUTPATIENT
Start: 2024-05-03

## 2024-05-03 RX ORDER — ISOSORBIDE DINITRATE 20 MG/1
20 TABLET ORAL 3 TIMES DAILY
Qty: 270 TABLET | Refills: 3 | Status: SHIPPED | OUTPATIENT
Start: 2024-05-03

## 2024-05-03 NOTE — PROGRESS NOTES
Out PATIENT New CARDIOLOGY CONSULT    Name: Molina Sosa    Age: 63 y.o.    Date of Admission: No admission date for patient encounter.    Date of Service: 5/5/2024    Reason for Consultation:   Chief Complaint   Patient presents with    New Patient          Referring Physician: No admitting provider for patient encounter.    History of Present Illness: 63-year-old male with below medical and cardiac history who was lost to follow-up and presents to reestablish care with cardiology.  He report doing well and denies any symptoms.  Echocardiogram was ordered to monitor systolic function and to guide therapy.        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%  History of sustained VT, status post LifeVest discharge status post ICD implantation-8/26/2020  Alcohol use disorder  Atrial flutter status post cardioversion remains in sinus rhythm  On Eliquis for anticoagulation  VHD: Moderate to severe MR  Hypertension, well controlled  CKD  Obesity with possible obstructive sleep apnea  Former smoker  History of angioedema secondary to ACE inhibitor and ARB therapy     Past Medical History:   Diagnosis Date    Arthritis     right hip    CAD (coronary artery disease)     CHF (congestive heart failure) ([facility]) 2020    Hypertension     New onset atrial flutter ([facility]) 2020    Nonischemic cardiomyopathy ([facility]) 08/2020    Seasonal allergies     VF (ventricular fibrillation) ([facility]) 08/2020       Past Surgical History:  Past Surgical History:   Procedure Laterality Date    BACK SURGERY  2012    lumbar area L4 L5    CARDIAC DEFIBRILLATOR PLACEMENT  08/27/2020    Dual ICD    (medtronic)    Dr. Jose    DILATATION, ESOPHAGUS      HERNIA REPAIR Right 1990's    inguinal    HIP SURGERY  09/13/2016    left total; hip arthroplasty    JOINT REPLACEMENT Left 2013

## 2024-05-05 PROBLEM — I50.22 CHRONIC SYSTOLIC CONGESTIVE HEART FAILURE (HCC): Status: ACTIVE | Noted: 2024-05-05

## 2024-05-07 RX ORDER — SPIRONOLACTONE 25 MG/1
25 TABLET ORAL DAILY
Qty: 90 TABLET | Refills: 3 | Status: SHIPPED | OUTPATIENT
Start: 2024-05-07

## 2024-05-07 RX ORDER — SPIRONOLACTONE 25 MG/1
25 TABLET ORAL DAILY
Qty: 90 TABLET | Refills: 0 | Status: SHIPPED
Start: 2024-05-07 | End: 2024-05-07

## 2024-05-08 ENCOUNTER — TELEPHONE (OUTPATIENT)
Dept: NON INVASIVE DIAGNOSTICS | Age: 64
End: 2024-05-08

## 2024-05-08 ENCOUNTER — TELEPHONE (OUTPATIENT)
Dept: CARDIOLOGY | Age: 64
End: 2024-05-08

## 2024-05-08 NOTE — TELEPHONE ENCOUNTER
CALLED PATIENT AND LEFT MESSAGE TO SCHEDULE ECHO.    Electronically signed by Audrey Walsh on 5/8/2024 at 10:36 AM

## 2024-05-08 NOTE — TELEPHONE ENCOUNTER
----- Message from Indu Huang MA sent at 5/2/2024 12:52 PM EDT -----  The patient didn't answer so I have to forward to you for an appt-MDT

## 2024-05-08 NOTE — TELEPHONE ENCOUNTER
Left message for patient to contact office to schedule office visit and device check.    past due ov w/CK (last seen 5/2023) , MDT device; on Eliquis & sasha

## 2024-07-19 ENCOUNTER — TELEPHONE (OUTPATIENT)
Dept: NON INVASIVE DIAGNOSTICS | Age: 64
End: 2024-07-19

## 2024-07-19 DIAGNOSIS — Z51.81 ENCOUNTER FOR MONITORING AMIODARONE THERAPY: ICD-10-CM

## 2024-07-19 DIAGNOSIS — I47.20 VENTRICULAR TACHYCARDIA (HCC): Primary | ICD-10-CM

## 2024-07-19 DIAGNOSIS — Z79.899 ENCOUNTER FOR MONITORING AMIODARONE THERAPY: ICD-10-CM

## 2024-07-19 DIAGNOSIS — I48.92 ATRIAL FLUTTER WITH RAPID VENTRICULAR RESPONSE (HCC): ICD-10-CM

## 2024-07-19 RX ORDER — METOPROLOL SUCCINATE 25 MG/1
75 TABLET, EXTENDED RELEASE ORAL DAILY
Qty: 270 TABLET | Refills: 0 | Status: SHIPPED | OUTPATIENT
Start: 2024-07-19

## 2024-07-19 RX ORDER — AMIODARONE HYDROCHLORIDE 200 MG/1
200 TABLET ORAL DAILY
Qty: 90 TABLET | Refills: 0 | Status: SHIPPED | OUTPATIENT
Start: 2024-07-19

## 2024-07-19 NOTE — TELEPHONE ENCOUNTER
Lab orders for future med refills.    Electronically signed by Andra Hanna MA on 7/19/2024 at 2:01 PM

## 2024-10-23 ENCOUNTER — TELEPHONE (OUTPATIENT)
Dept: NON INVASIVE DIAGNOSTICS | Age: 64
End: 2024-10-23

## 2024-10-23 NOTE — TELEPHONE ENCOUNTER
Refill requests from pharmacy was received (metoprolol and Amiodarone).  I called patient to let him know no refills can be issued until his f/u appt is rescheduled (patient no showed 10/14/24 with Dr Jose).  Patient needs to reschedule appt for refills.   [Negative] : Heme/Lymph

## 2024-10-31 ENCOUNTER — TELEPHONE (OUTPATIENT)
Dept: NON INVASIVE DIAGNOSTICS | Age: 64
End: 2024-10-31

## 2024-10-31 NOTE — TELEPHONE ENCOUNTER
L/m for patient to call back.  Patient needs lab work and EKG for refills on Amiodarone and metoprolol.  Patient is also overdue for f/u.

## 2025-03-20 DIAGNOSIS — I48.92 ATRIAL FLUTTER WITH RAPID VENTRICULAR RESPONSE (HCC): ICD-10-CM

## 2025-03-21 RX ORDER — AMIODARONE HYDROCHLORIDE 200 MG/1
200 TABLET ORAL DAILY
Qty: 30 TABLET | Refills: 0 | Status: SHIPPED | OUTPATIENT
Start: 2025-03-21

## 2025-03-21 RX ORDER — METOPROLOL SUCCINATE 25 MG/1
75 TABLET, EXTENDED RELEASE ORAL DAILY
Qty: 270 TABLET | Refills: 3 | Status: SHIPPED | OUTPATIENT
Start: 2025-03-21

## 2025-03-24 ENCOUNTER — TELEPHONE (OUTPATIENT)
Dept: NON INVASIVE DIAGNOSTICS | Age: 65
End: 2025-03-24

## 2025-03-24 NOTE — TELEPHONE ENCOUNTER
I left a message with the patient that he has to be seen in the office and get blood work done to get more medication refills. The patient has not been compliant with scheduled appointments and has not been seen since 5/1/2023. The patient has been advised on several occasions he needs to get blood work and be seen in the office to continue receiving refills on amiodarone and metoprolol.     Electronically signed by Andra Hanna MA on 3/24/2025 at 12:00 PM

## 2025-03-25 NOTE — TELEPHONE ENCOUNTER
Patient is scheduled on 4/22/25 with Dr. Jose.     Electronically signed by Andra Hanna MA on 3/25/2025 at 12:16 PM

## 2025-04-14 DIAGNOSIS — I48.92 ATRIAL FLUTTER WITH RAPID VENTRICULAR RESPONSE (HCC): ICD-10-CM

## 2025-04-15 RX ORDER — AMIODARONE HYDROCHLORIDE 200 MG/1
200 TABLET ORAL DAILY
Qty: 30 TABLET | Refills: 0 | Status: SHIPPED | OUTPATIENT
Start: 2025-04-15

## 2025-05-04 DIAGNOSIS — M15.8 OTHER OSTEOARTHRITIS INVOLVING MULTIPLE JOINTS: Chronic | ICD-10-CM

## 2025-05-04 DIAGNOSIS — I50.9 NEW ONSET OF CONGESTIVE HEART FAILURE (HCC): ICD-10-CM

## 2025-05-04 DIAGNOSIS — I25.10 CAD IN NATIVE ARTERY: ICD-10-CM

## 2025-05-04 DIAGNOSIS — I48.92 NEW ONSET ATRIAL FLUTTER (HCC): ICD-10-CM

## 2025-05-04 DIAGNOSIS — M16.11 PRIMARY OSTEOARTHRITIS OF RIGHT HIP: ICD-10-CM

## 2025-05-04 DIAGNOSIS — I10 ESSENTIAL HYPERTENSION: Chronic | ICD-10-CM

## 2025-05-04 DIAGNOSIS — I48.3 TYPICAL ATRIAL FLUTTER (HCC): ICD-10-CM

## 2025-05-04 DIAGNOSIS — I50.22 CHRONIC SYSTOLIC CONGESTIVE HEART FAILURE (HCC): ICD-10-CM

## 2025-05-04 DIAGNOSIS — I47.20 VENTRICULAR TACHYCARDIA (HCC): ICD-10-CM

## 2025-05-04 DIAGNOSIS — I42.8 NONISCHEMIC CARDIOMYOPATHY (HCC): ICD-10-CM

## 2025-05-06 RX ORDER — FUROSEMIDE 40 MG/1
40 TABLET ORAL DAILY
Qty: 90 TABLET | Refills: 1 | Status: SHIPPED | OUTPATIENT
Start: 2025-05-06

## 2025-05-09 DIAGNOSIS — I48.92 ATRIAL FLUTTER WITH RAPID VENTRICULAR RESPONSE (HCC): ICD-10-CM

## 2025-05-09 RX ORDER — AMIODARONE HYDROCHLORIDE 200 MG/1
200 TABLET ORAL DAILY
Qty: 90 TABLET | Refills: 1 | Status: SHIPPED | OUTPATIENT
Start: 2025-05-09

## 2025-07-04 DIAGNOSIS — I25.10 CAD IN NATIVE ARTERY: ICD-10-CM

## 2025-07-04 DIAGNOSIS — I10 ESSENTIAL HYPERTENSION: Chronic | ICD-10-CM

## 2025-07-04 DIAGNOSIS — I42.8 NONISCHEMIC CARDIOMYOPATHY (HCC): ICD-10-CM

## 2025-07-04 DIAGNOSIS — I48.3 TYPICAL ATRIAL FLUTTER (HCC): ICD-10-CM

## 2025-07-04 DIAGNOSIS — I47.20 VENTRICULAR TACHYCARDIA (HCC): ICD-10-CM

## 2025-07-04 DIAGNOSIS — I48.92 NEW ONSET ATRIAL FLUTTER (HCC): ICD-10-CM

## 2025-07-04 DIAGNOSIS — I50.22 CHRONIC SYSTOLIC CONGESTIVE HEART FAILURE (HCC): ICD-10-CM

## 2025-07-04 DIAGNOSIS — M15.8 OTHER OSTEOARTHRITIS INVOLVING MULTIPLE JOINTS: Chronic | ICD-10-CM

## 2025-07-04 DIAGNOSIS — M16.11 PRIMARY OSTEOARTHRITIS OF RIGHT HIP: ICD-10-CM

## 2025-07-04 DIAGNOSIS — I50.9 NEW ONSET OF CONGESTIVE HEART FAILURE (HCC): ICD-10-CM

## 2025-07-07 RX ORDER — ISOSORBIDE DINITRATE 20 MG/1
20 TABLET ORAL 3 TIMES DAILY
Qty: 270 TABLET | Refills: 3 | Status: SHIPPED | OUTPATIENT
Start: 2025-07-07

## (undated) DEVICE — CONVERTORS STOCKINETTE: Brand: CONVERTORS

## (undated) DEVICE — DRAPE,REIN 53X77,STERILE: Brand: MEDLINE

## (undated) DEVICE — FORCEPS RAT TOOTH 1X2

## (undated) DEVICE — SOLUTION IV IRRIG POUR BRL 0.9% SODIUM CHL 2F7124

## (undated) DEVICE — SYRINGE 20ML LL S/C 50

## (undated) DEVICE — Z DISCONTINUED PER MEDLINE USE 2741943 DRESSING AQUACEL 10 IN ALG W9XL25CM SIL CVR WTRPRF VIR BACT BARR ANTIMIC

## (undated) DEVICE — 1000 S-DRAPE TOWEL DRAPE 10/BX: Brand: STERI-DRAPE™

## (undated) DEVICE — KIT SURG W7XL11IN 2 PKT UNTREATED NA

## (undated) DEVICE — COVER HNDL LT DISP

## (undated) DEVICE — BLADE CLIPPER GEN PURP NS

## (undated) DEVICE — NEEDLE FLTR 18GA L1.5IN MEM THK5UM BLNT DISP

## (undated) DEVICE — Y-TYPE TUR/BLADDER IRRIGATION SET, REGULATING CLAMP

## (undated) DEVICE — TRAY HIP EXTRAS REUSABLE

## (undated) DEVICE — INSTRUMENT CLAMP GALLBLADDER REUSABLE

## (undated) DEVICE — TUBE IRRIG HNDPC HI FLO TP INTRPULS W/SUCTION TUBE

## (undated) DEVICE — SOLUTION IV IRRIG WATER 1000ML POUR BRL 2F7114

## (undated) DEVICE — GOWN,SIRUS,POLYRNF,BRTHSLV,XL,30/CS: Brand: MEDLINE

## (undated) DEVICE — TRAY LARGE DRILL REUSABLE

## (undated) DEVICE — SPONGE LAP W18XL18IN WHT COT 4 PLY FLD STRUNG RADPQ DISP ST

## (undated) DEVICE — PILLOW POS W15XH6XL22IN RASPBERRY FOAM ABD W/ STRP DISP FOR

## (undated) DEVICE — GAUZE,SPONGE,4"X4",8PLY,STRL,LF,10/TRAY: Brand: MEDLINE

## (undated) DEVICE — ELECTRODE PT RET AD L9FT HI MOIST COND ADH HYDRGEL CORDED

## (undated) DEVICE — INSTRUMENT RETRACTOR WEITLANER CURVED PP REUSABLE

## (undated) DEVICE — INSTRUMENT SYSTEM 7 BATTERY REUSABLE

## (undated) DEVICE — SET ORTHO STD STORTSTD1

## (undated) DEVICE — TUBING, SUCTION, 9/32" X 10', STRAIGHT: Brand: MEDLINE

## (undated) DEVICE — SYRINGE, LUER LOCK, 10ML: Brand: MEDLINE

## (undated) DEVICE — SOLUTION IRRIG 2000ML 0.9% SOD CHL USP UROMATIC PLAS CONT

## (undated) DEVICE — TOTAL HIP PK

## (undated) DEVICE — PACK PROCEDURE SURG GEN CUST

## (undated) DEVICE — STRIP,CLOSURE,WOUND,MEDI-STRIP,1/2X4: Brand: MEDLINE

## (undated) DEVICE — NEEDLE HYPO 22GA L1.5IN BLK POLYPR HUB S STL REG BVL STR

## (undated) DEVICE — GOWN,SIRUS,FABRNF,2XL,18/CS: Brand: MEDLINE

## (undated) DEVICE — DOUBLE BASIN SET: Brand: MEDLINE INDUSTRIES, INC.

## (undated) DEVICE — TOWEL,OR,DSP,ST,BLUE,STD,6/PK,12PK/CS: Brand: MEDLINE

## (undated) DEVICE — TRAY LAMBOTS REUSABLE

## (undated) DEVICE — INSTRUMENT LARGE BATTERY REUSABLE

## (undated) DEVICE — 4-PORT MANIFOLD: Brand: NEPTUNE 2

## (undated) DEVICE — CHLORAPREP 26ML ORANGE

## (undated) DEVICE — 3M™ IOBAN™ 2 ANTIMICROBIAL INCISE DRAPE 6650EZ: Brand: IOBAN™ 2

## (undated) DEVICE — GLOVE SURG SZ 8 CRM LTX FREE POLYISOPRENE POLYMER BEAD ANTI

## (undated) DEVICE — INSTRUMENT CLAMP TOWEL LARGE REUSABLE

## (undated) DEVICE — PILLOW ABDUCTION LEG DISP

## (undated) DEVICE — TRAY  ZIMMER HIP POSTERIOR RETRACTOR REUSABLE

## (undated) DEVICE — INSTRUMENT ZIMMER HIP LOW PROFILE REAMER HANDLE REUSABLE

## (undated) DEVICE — BLADE ES L6IN ELASTOMERIC COAT EXT DURABLE BEND UPTO 90DEG

## (undated) DEVICE — BLADE SAW W11XL77.5MM THK1.23MM CUT THK1.17MM S STL RECIP

## (undated) DEVICE — SHOECOVER ANTI-SKID: Brand: CARDINAL HEALTH

## (undated) DEVICE — STERILE PVP: Brand: MEDLINE INDUSTRIES, INC.

## (undated) DEVICE — GLOVE SURG SZ 8 L12IN FNGR THK94MIL TRNSLUC YEL LTX HYDRGEL

## (undated) DEVICE — SOLUTION IV 1000ML 0.9% SOD CHL PH 5 INJ USP VIAFLX PLAS

## (undated) DEVICE — 3M™ STERI-DRAPE™ U-DRAPE 1015: Brand: STERI-DRAPE™

## (undated) DEVICE — MARKER,SKIN,WI/RULER AND LABELS: Brand: MEDLINE

## (undated) DEVICE — 3M™ COBAN™ NL STERILE NON-LATEX SELF-ADHERENT WRAP, 2084S, 4 IN X 5 YD (10 CM X 4,5 M), 18 ROLLS/CASE: Brand: 3M™ COBAN™

## (undated) DEVICE — Z DISCONTINUED USE 2275686 GLOVE SURG SZ 8 L12IN FNGR THK13MIL WHT ISOLEX POLYISOPRENE

## (undated) DEVICE — SUTURE STRATAFIX SYMMETRIC PDS + SZ 1 L18IN ABSRB VLT OS-6 SXPP1A201

## (undated) DEVICE — INSTRUMENT CURRETTES REUSABLE

## (undated) DEVICE — DRAPE,TOP,102X53,STERILE: Brand: MEDLINE